# Patient Record
Sex: FEMALE | Race: WHITE | HISPANIC OR LATINO | Employment: OTHER | URBAN - NONMETROPOLITAN AREA
[De-identification: names, ages, dates, MRNs, and addresses within clinical notes are randomized per-mention and may not be internally consistent; named-entity substitution may affect disease eponyms.]

---

## 2017-01-04 ENCOUNTER — OFFICE VISIT (OUTPATIENT)
Dept: CARDIOLOGY | Facility: CLINIC | Age: 77
End: 2017-01-04

## 2017-01-04 VITALS
HEART RATE: 98 BPM | BODY MASS INDEX: 24.97 KG/M2 | WEIGHT: 127.2 LBS | SYSTOLIC BLOOD PRESSURE: 193 MMHG | DIASTOLIC BLOOD PRESSURE: 79 MMHG | HEIGHT: 60 IN

## 2017-01-04 DIAGNOSIS — I50.32 CHRONIC DIASTOLIC HEART FAILURE (HCC): Primary | ICD-10-CM

## 2017-01-04 DIAGNOSIS — I10 ESSENTIAL HYPERTENSION: ICD-10-CM

## 2017-01-04 DIAGNOSIS — R00.2 PALPITATIONS: ICD-10-CM

## 2017-01-04 PROBLEM — Z79.899 POLYPHARMACY: Status: ACTIVE | Noted: 2017-01-04

## 2017-01-04 PROCEDURE — 99213 OFFICE O/P EST LOW 20 MIN: CPT | Performed by: INTERNAL MEDICINE

## 2017-01-04 RX ORDER — GRANULES FOR ORAL 3 G/1
3 POWDER ORAL ONCE
COMMUNITY
End: 2017-03-16

## 2017-01-04 NOTE — MR AVS SNAPSHOT
Lisa Calloway   1/4/2017 1:00 PM   Office Visit    Dept Phone:  [phone]   Encounter #:  [id number]    Provider:  Flako Lam MD   Department:  [facility] CARDIOLOGY                Your Full Care Plan              Today's Medication Changes          These changes are accurate as of: 1/4/17  1:58 PM.  If you have any questions, ask your nurse or doctor.               Stop taking medication(s)listed here:     atenolol 25 MG tablet   Commonly known as:  TENORMIN   Stopped by:  Flako Lam MD           calcium acetate 667 MG capsule   Commonly known as:  PHOSLO   Stopped by:  Flako Lam MD           fluconazole 150 MG tablet   Commonly known as:  DIFLUCAN   Stopped by:  Flako Lam MD           pravastatin 20 MG tablet   Commonly known as:  PRAVACHOL   Stopped by:  Flako Lam MD           triamcinolone 0.1 % paste   Commonly known as:  KENALOG   Stopped by:  Flako Lam MD                      Your Updated Medication List          This list is accurate as of: 1/4/17  1:58 PM.  Always use your most recent med list.                amitriptyline 10 MG tablet   Commonly known as:  ELAVIL       aspirin 81 MG tablet       baclofen 10 MG tablet   Commonly known as:  LIORESAL       busPIRone 10 MG tablet   Commonly known as:  BUSPAR       Calcium-Magnesium-Vitamin D - MG-MG-UNIT tablet sustained-release 24 hour       CELEBREX 200 MG capsule   Generic drug:  celecoxib       fluticasone 50 MCG/ACT nasal spray   Commonly known as:  FLONASE       fosfomycin 3 G pack   Commonly known as:  MONUROL       furosemide 20 MG tablet   Commonly known as:  LASIX       hydrochlorothiazide 12.5 MG capsule   Commonly known as:  MICROZIDE       loratadine 10 MG tablet   Commonly known as:  CLARITIN       MULTI VITAMIN DAILY tablet       multivitamin with fluoride/iron 0.25-10 MG/ML solution  solution       NEURONTIN 600 MG tablet   Generic drug:  gabapentin       NIFEdipine XL 30 MG 24 hr tablet   Commonly known as:  PROCARDIA XL       omeprazole 20 MG capsule   Commonly known as:  priLOSEC       potassium chloride 10 MEQ CR capsule   Commonly known as:  MICRO-K       PROAIR  (90 BASE) MCG/ACT inhaler   Generic drug:  albuterol       traMADol 50 MG tablet   Commonly known as:  ULTRAM               You Were Diagnosed With        Codes Comments    Essential hypertension    -  Primary ICD-10-CM: I10  ICD-9-CM: 401.9     Raynaud's disease without gangrene     ICD-10-CM: I73.00  ICD-9-CM: 443.0     Chronic diastolic heart failure     ICD-10-CM: I50.32  ICD-9-CM: 428.32     Palpitations     ICD-10-CM: R00.2  ICD-9-CM: 785.1       Instructions     None    Patient Instructions History      Upcoming Appointments     Visit Type Date Time Department    FOLLOW UP 1/4/2017  1:00 PM [facility]    FOLLOW UP 7/19/2017  1:45 PM [facility]      [employer] Signup     Our records indicate that you have declined [facility] signup. If you would like to sign up for [employer], please email [email or web address]" or call [phone] to obtain an activation code.             Other Info from Your Visit           Your Appointments     Jul 19, 2017  1:45 PM EDT   Follow Up with Flako Lam MD   [facility] (--)    [address]  [facility] [id number] [phone]           Arrive 15 minutes prior to appointment.              Allergies     Amlodipine Intolerance GI Intolerance    Atorvastatin  Myalgia    Beta Adrenergic Blockers      Codeine Intolerance GI Intolerance    Coreg [Carvedilol]  GI Intolerance    Ergotamine-caffeine      Lisinopril  Other (See Comments)    hypotension    Metoprolol Intolerance GI Intolerance    Other      Tenormin [Atenolol] Intolerance GI Intolerance    Trovan [Trovafloxacin]      Cefprozil Allergy  "Rash    Contrast Dye  Rash    Fish-derived Products Allergy Rash    Penicillins Allergy Rash    Sectral [Acebutolol Hcl]  Rash    Sulfa Antibiotics Allergy Rash    Tetracycline Intolerance Rash      Reason for Visit     Diastolic heart failure follow up    Palpitations           Vital Signs     Blood Pressure Pulse Height Weight Body Mass Index Smoking Status    193/79 (BP Location: Right arm, Patient Position: Sitting) 98 60\" (152.4 cm) 127 lb 3.2 oz (57.7 kg) 24.84 kg/m2 Never Smoker      Problems and Diagnoses Noted     Heart failure    High blood pressure    Palpitations    Raynaud disease        "

## 2017-01-04 NOTE — ASSESSMENT & PLAN NOTE
· Severely elevated today's visit  · Patient reports her systolic blood pressure was 135 and Dr. Acosta's clinic yesterday.  · Given her multiple drug allergies, I will not make any changes or additions to her medical therapy today.

## 2017-01-04 NOTE — ASSESSMENT & PLAN NOTE
· NYHA class III heart failure symptoms  · Reasonable to liberalize loop diuretic treatment if shortness of breath symptoms persist/worsen

## 2017-01-04 NOTE — LETTER
January 4, 2017     Tobin Acosta MD  [address] [place] [address]    Patient: Lisa Calloway   YOB: 1940   Date of Visit: 1/4/2017       Dear Dr. Karla MD:    Thank you for referring Lisa Calloway to me for evaluation. Below are the relevant portions of my assessment and plan of care.    If you have questions, please do not hesitate to call me. I look forward to following Lisa along with you.         Sincerely,        Flako Lam MD        CC: No Recipients  Flako Lam MD  1/4/2017  4:11 PM  Signed  Encounter Date:01/04/2017    Patient ID: Lisa Calloway is a 76 y.o. female who is  and resides in [place], [place].  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    CC/Reason for visit:  Diastolic heart failure (follow up) and Palpitations          Lisa Calloway returns to the office today in follow-up of her diastolic heart failure palpitations.  Lisa continues to have episodes of palpitations and diaphoresis which occur sporadically and spontaneously.  She is being treated for anxiety/panic disorder with BuSpar with some improvement in symptoms.  She loss one of her best friend's on [address].  She is accompanied by another friend today.  Overall, her clinical course is been steady and she denies any new symptoms of heart failure or chest pain.    Review of Systems   Constitution: Positive for malaise/fatigue and night sweats. Negative for weakness.   Eyes: Negative for vision loss in left eye and vision loss in right eye.   Cardiovascular: Negative for chest pain, dyspnea on exertion, near-syncope, orthopnea, palpitations, paroxysmal nocturnal dyspnea and syncope.   Musculoskeletal: Negative for myalgias.   Neurological: Negative for brief paralysis, excessive daytime sleepiness, focal weakness, numbness and paresthesias.   Psychiatric/Behavioral: The patient is nervous/anxious.    All other systems  reviewed and are negative.      The patient's past medical, social, and family history reviewed in the patient's electronic medical record.    Allergies  Amlodipine; Atorvastatin; Beta adrenergic blockers; Codeine; Coreg [carvedilol]; Ergotamine-caffeine; Lisinopril; Metoprolol; Other; Tenormin [atenolol]; Trovan [trovafloxacin]; Cefprozil; Contrast dye; Fish-derived products; Penicillins; Sectral [acebutolol hcl]; Sulfa antibiotics; and Tetracycline    Outpatient Prescriptions Marked as Taking for the 1/4/17 encounter (Office Visit) with Flako Lam MD   Medication Sig Dispense Refill   • amitriptyline (ELAVIL) 10 MG tablet Take 10 mg by mouth At Night As Needed.     • aspirin 81 MG tablet Take 1 tablet by mouth daily.     • baclofen (LIORESAL) 10 MG tablet Take 1 tablet by mouth 2 (two) times a day.     • busPIRone (BUSPAR) 10 MG tablet Take 10 mg by mouth Daily.  4   • Calcium-Magnesium-Vitamin D - MG-MG-UNIT tablet sustained-release 24 hour Take  by mouth.     • celecoxib (CELEBREX) 200 MG capsule Take 1 capsule by mouth daily.     • fluticasone (FLONASE) 50 MCG/ACT nasal spray 1 spray into each nostril Daily.  1   • fosfomycin (MONUROL) 3 G pack Take 3 g by mouth 1 (One) Time.     • furosemide (LASIX) 20 MG tablet Take 20 mg by mouth Daily As Needed.     • gabapentin (NEURONTIN) 600 MG tablet Take 600 mg by mouth Daily.     • hydrochlorothiazide (MICROZIDE) 12.5 MG capsule Take  by mouth daily.     • loratadine (CLARITIN) 10 MG tablet Take 10 mg by mouth Daily.  1   • Multiple Vitamin (MULTI VITAMIN DAILY) tablet Take  by mouth.     • NIFEdipine XL (PROCARDIA XL) 30 MG 24 hr tablet      • omeprazole (PriLOSEC) 20 MG capsule Take 20 mg by mouth Daily.     • Ped Multivitamins-Fl-Iron (MULTIVITAMIN WITH FLUORIDE/IRON) 0.25-10 MG/ML solution solution Take  by mouth Daily.     • potassium chloride (MICRO-K) 10 MEQ CR capsule Take 1 tablet by mouth every 8 (eight) hours.     • PROAIR   "(90 BASE) MCG/ACT inhaler      • traMADol (ULTRAM) 50 MG tablet Take  by mouth Every 8 (Eight) Hours As Needed.           Blood pressure (!) 193/79, pulse 98, height 60\" (152.4 cm), weight 127 lb 3.2 oz (57.7 kg).  Body mass index is 24.84 kg/(m^2).    Physical Exam   Constitutional: She is oriented to person, place, and time. She appears well-developed and well-nourished.   HENT:   Head: Normocephalic and atraumatic.   Eyes: Pupils are equal, round, and reactive to light. No scleral icterus.   Neck: No JVD present. Carotid bruit is not present. No thyromegaly present.   Cardiovascular: Normal rate, regular rhythm, S1 normal and S2 normal.  Exam reveals no gallop.    No murmur heard.  Pulmonary/Chest: Effort normal and breath sounds normal.   Abdominal: Soft. There is no tenderness.   Neurological: She is alert and oriented to person, place, and time.   Skin: Skin is warm and dry. No cyanosis. Nails show no clubbing.   Psychiatric: She has a normal mood and affect. Her behavior is normal.       Data Review:   Procedures         Problem List Items Addressed This Visit        Cardiology Problems    Chronic diastolic heart failure - Primary    Overview     · Echo (8/10/2011): LVH. LVEF 70%.  No significant valvular abnormality.   · Pharmacologic nuclear stress (7/28/2011): No ischemia. LVEF 80%.  · Recurrent chest pain/CHF presentations to the [place] area.  · Cardiac catheterization (5/23/2012):  Normal coronary arteries, LVEF 65%, LVEDP 32 mmHg.  · Echocardiogram for worsening dyspnea (3/18/2016): LVEF >65%. No significant valvular abnormalities. RVSP 43 mmHg.   · GXT (3/18//2016): Exercised for 3 minutes (6 minutes expected). No ischemic changes. Normal oxygen saturation.          Current Assessment & Plan     · NYHA class III heart failure symptoms  · Reasonable to liberalize loop diuretic treatment if shortness of breath symptoms persist/worsen            Other    Essential hypertension    Overview    "  · Renal angiography, (05/23/2012): normal bilateral renal arteries.          Current Assessment & Plan     · Severely elevated today's visit  · Patient reports her systolic blood pressure was 135 and Dr. Acosta's clinic yesterday.  · Given her multiple drug allergies, I will not make any changes or additions to her medical therapy today.         Palpitations    Overview     · Essentially normal Holter monitor, 2012.  · Recurrent severe palpitations, October 2015.         Current Assessment & Plan     · Stable symptoms likely related to anxiety/panic disorder                    · Continue present medical therapy  · Return to clinic in 6 months    Flako Lam MD  1/4/2017

## 2017-01-04 NOTE — PROGRESS NOTES
Encounter Date:01/04/2017    Patient ID: Lisa Calloway is a 76 y.o. female who is  and resides in [place], [place].  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    CC/Reason for visit:  Diastolic heart failure (follow up) and Palpitations          Lisa Calloway returns to the office today in follow-up of her diastolic heart failure palpitations.  Lisa continues to have episodes of palpitations and diaphoresis which occur sporadically and spontaneously.  She is being treated for anxiety/panic disorder with BuSpar with some improvement in symptoms.  She loss one of her best friend's on [address].  She is accompanied by another friend today.  Overall, her clinical course is been steady and she denies any new symptoms of heart failure or chest pain.    Review of Systems   Constitution: Positive for malaise/fatigue and night sweats. Negative for weakness.   Eyes: Negative for vision loss in left eye and vision loss in right eye.   Cardiovascular: Negative for chest pain, dyspnea on exertion, near-syncope, orthopnea, palpitations, paroxysmal nocturnal dyspnea and syncope.   Musculoskeletal: Negative for myalgias.   Neurological: Negative for brief paralysis, excessive daytime sleepiness, focal weakness, numbness and paresthesias.   Psychiatric/Behavioral: The patient is nervous/anxious.    All other systems reviewed and are negative.      The patient's past medical, social, and family history reviewed in the patient's electronic medical record.    Allergies  Amlodipine; Atorvastatin; Beta adrenergic blockers; Codeine; Coreg [carvedilol]; Ergotamine-caffeine; Lisinopril; Metoprolol; Other; Tenormin [atenolol]; Trovan [trovafloxacin]; Cefprozil; Contrast dye; Fish-derived products; Penicillins; Sectral [acebutolol hcl]; Sulfa antibiotics; and Tetracycline    Outpatient Prescriptions Marked as Taking for the 1/4/17 encounter (Office Visit) with Flako MALDONADO  "MD [facility]   Medication Sig Dispense Refill   • amitriptyline (ELAVIL) 10 MG tablet Take 10 mg by mouth At Night As Needed.     • aspirin 81 MG tablet Take 1 tablet by mouth daily.     • baclofen (LIORESAL) 10 MG tablet Take 1 tablet by mouth 2 (two) times a day.     • busPIRone (BUSPAR) 10 MG tablet Take 10 mg by mouth Daily.  4   • Calcium-Magnesium-Vitamin D - MG-MG-UNIT tablet sustained-release 24 hour Take  by mouth.     • celecoxib (CELEBREX) 200 MG capsule Take 1 capsule by mouth daily.     • fluticasone (FLONASE) 50 MCG/ACT nasal spray 1 spray into each nostril Daily.  1   • fosfomycin (MONUROL) 3 G pack Take 3 g by mouth 1 (One) Time.     • furosemide (LASIX) 20 MG tablet Take 20 mg by mouth Daily As Needed.     • gabapentin (NEURONTIN) 600 MG tablet Take 600 mg by mouth Daily.     • hydrochlorothiazide (MICROZIDE) 12.5 MG capsule Take  by mouth daily.     • loratadine (CLARITIN) 10 MG tablet Take 10 mg by mouth Daily.  1   • Multiple Vitamin (MULTI VITAMIN DAILY) tablet Take  by mouth.     • NIFEdipine XL (PROCARDIA XL) 30 MG 24 hr tablet      • omeprazole (PriLOSEC) 20 MG capsule Take 20 mg by mouth Daily.     • Ped Multivitamins-Fl-Iron (MULTIVITAMIN WITH FLUORIDE/IRON) 0.25-10 MG/ML solution solution Take  by mouth Daily.     • potassium chloride (MICRO-K) 10 MEQ CR capsule Take 1 tablet by mouth every 8 (eight) hours.     • PROAIR  (90 BASE) MCG/ACT inhaler      • traMADol (ULTRAM) 50 MG tablet Take  by mouth Every 8 (Eight) Hours As Needed.           Blood pressure (!) 193/79, pulse 98, height 60\" (152.4 cm), weight 127 lb 3.2 oz (57.7 kg).  Body mass index is 24.84 kg/(m^2).    Physical Exam   Constitutional: She is oriented to person, place, and time. She appears well-developed and well-nourished.   HENT:   Head: Normocephalic and atraumatic.   Eyes: Pupils are equal, round, and reactive to light. No scleral icterus.   Neck: No JVD present. Carotid bruit is not present. No " thyromegaly present.   Cardiovascular: Normal rate, regular rhythm, S1 normal and S2 normal.  Exam reveals no gallop.    No murmur heard.  Pulmonary/Chest: Effort normal and breath sounds normal.   Abdominal: Soft. There is no tenderness.   Neurological: She is alert and oriented to person, place, and time.   Skin: Skin is warm and dry. No cyanosis. Nails show no clubbing.   Psychiatric: She has a normal mood and affect. Her behavior is normal.       Data Review:   Procedures         Problem List Items Addressed This Visit        Cardiology Problems    Chronic diastolic heart failure - Primary    Overview     · Echo (8/10/2011): LVH. LVEF 70%.  No significant valvular abnormality.   · Pharmacologic nuclear stress (7/28/2011): No ischemia. LVEF 80%.  · Recurrent chest pain/CHF presentations to the [facility].  · Cardiac catheterization (5/23/2012):  Normal coronary arteries, LVEF 65%, LVEDP 32 mmHg.  · Echocardiogram for worsening dyspnea (3/18/2016): LVEF >65%. No significant valvular abnormalities. RVSP 43 mmHg.   · GXT (3/18//2016): Exercised for 3 minutes (6 minutes expected). No ischemic changes. Normal oxygen saturation.          Current Assessment & Plan     · NYHA class III heart failure symptoms  · Reasonable to liberalize loop diuretic treatment if shortness of breath symptoms persist/worsen            Other    Essential hypertension    Overview     · Renal angiography, (05/23/2012): normal bilateral renal arteries.          Current Assessment & Plan     · Severely elevated today's visit  · Patient reports her systolic blood pressure was 135 and Dr. Acosta's clinic yesterday.  · Given her multiple drug allergies, I will not make any changes or additions to her medical therapy today.         Palpitations    Overview     · Essentially normal Holter monitor, 2012.  · Recurrent severe palpitations, October 2015.         Current Assessment & Plan     · Stable symptoms likely related to anxiety/panic  disorder                    · Continue present medical therapy  · Return to clinic in 6 months    Flako Lam MD  1/4/2017

## 2017-02-20 ENCOUNTER — OFFICE VISIT (OUTPATIENT)
Dept: NEUROSURGERY | Facility: CLINIC | Age: 77
End: 2017-02-20

## 2017-02-20 DIAGNOSIS — M43.16 SPONDYLOLISTHESIS OF LUMBAR REGION: Primary | ICD-10-CM

## 2017-02-20 PROBLEM — M48.062 SPINAL STENOSIS, LUMBAR REGION, WITH NEUROGENIC CLAUDICATION: Status: ACTIVE | Noted: 2017-02-20

## 2017-02-20 PROCEDURE — 99214 OFFICE O/P EST MOD 30 MIN: CPT | Performed by: NEUROLOGICAL SURGERY

## 2017-02-20 NOTE — PROGRESS NOTES
Subjective   Patient ID: Lisa Calloway is a 77 y.o. female is being seen for consultation today at the request of Tobin Acosta MD  Chief Complaint: Back and leg pain    History of Present Illness: The patient is a 77-year-old woman who has a history of progressive back and leg pain over the past year that has become extremely severe in the past 7-8 weeks, to the point that now she is taking hydrocodone 7.5 at least 3 times a day.  She had an L4 5 PLIF in January 2013, 4 years ago for spondylolisthesis with stenosis.  I last saw her in August 2015 with symptoms of increasing back pain and findings on MRI scan of early facet hypertrophy at L3 4 and a 2 mm spondylolisthesis at L3 4.  She has tried physical therapy in the past and actually felt worse with the attempts then seeing any improvement.  She has had a left hip replacement in 2014 and had a staph infection afterwards.  She is now nearly incapacitated by the level of pain she's having although she's functional when she is at a dose of hydrocodone.    Review of Radiographic Studies:  Lumbar MRI scan on 1/27/17 shows severe spinal stenosis now at L3 4 with a grade 1 spondylolisthesis.  The remaining spinal levels are unremarkable.  There is a PLIF at L4 5 that looks fine and a minor disc bulge at L5-S1.    The following portions of the patient's history were reviewed, updated as appropriate and approved: allergies, current medications, past family history, past medical history, past social history, past surgical history, review of systems and problem list.  Review of Systems   Constitutional: Positive for activity change, appetite change and diaphoresis. Negative for chills, fatigue, fever and unexpected weight change.   HENT: Positive for sinus pressure. Negative for congestion, dental problem, drooling, ear discharge, ear pain, facial swelling, hearing loss, mouth sores, nosebleeds, postnasal drip, rhinorrhea, sneezing, sore throat, tinnitus, trouble swallowing  and voice change.    Eyes: Negative for photophobia, pain, discharge, redness, itching and visual disturbance.   Respiratory: Positive for shortness of breath. Negative for apnea, cough, choking, chest tightness, wheezing and stridor.    Cardiovascular: Positive for leg swelling. Negative for chest pain and palpitations.   Gastrointestinal: Negative for abdominal distention, abdominal pain, anal bleeding, blood in stool, constipation, diarrhea, nausea, rectal pain and vomiting.   Endocrine: Negative for cold intolerance, heat intolerance, polydipsia, polyphagia and polyuria.   Genitourinary: Negative for decreased urine volume, difficulty urinating, dysuria, enuresis, flank pain, frequency, genital sores, hematuria and urgency.   Musculoskeletal: Positive for back pain. Negative for arthralgias, gait problem, joint swelling, myalgias, neck pain and neck stiffness.   Skin: Negative for color change, pallor, rash and wound.   Allergic/Immunologic: Negative for environmental allergies, food allergies and immunocompromised state.   Neurological: Positive for weakness and light-headedness. Negative for dizziness, tremors, seizures, syncope, facial asymmetry, speech difficulty, numbness and headaches.   Hematological: Negative for adenopathy. Does not bruise/bleed easily.   Psychiatric/Behavioral: Positive for sleep disturbance. Negative for agitation, behavioral problems, confusion, decreased concentration, dysphoric mood, hallucinations, self-injury and suicidal ideas. The patient is hyperactive. The patient is not nervous/anxious.    All other systems reviewed and are negative.      Objective     NEUROLOGICAL EXAMINATION:      MENTAL STATUS:  Alert and oriented.  Speech intact.  Recent and remote memory intact.      CRANIAL NERVES:  Cranial nerve II:  Visual fields are full to confrontation.  Cranial nerves III, IV and VI:  PERRLADC.  Extraocular movements are intact.  Nystagmus is not present.  Cranial nerve V:   Facial sensation is intact to light touch.  Cranial nerve VII:  Muscles of facial expression reveal no asymmetry.  Cranial nerve VIII:  Hearing is intact to finger rub bilaterally.  Cranial nerves IX and X:  Palate elevates symmetrically.  Cranial nerve XI:  Shoulder shrug is intact.  Cranial nerve XII:  Tongue is midline without evidence of atrophy or fasciculation.    MUSCULOSKELETAL:  SLR negative. Vishal's test negative.    MOTOR:  Deltoid, biceps, triceps, and  strength intact.  No hand atrophy.  Hip flexion, knee extension, ankle dorsiflexion and plantar flexion intact. No tremor, spasticity, ataxia, or dysmetria.    SENSATION: Intact to touch upper and lower extremities.  Position sense intact.    REFLEXES:  DTR trace and equal in both knees and ankles.    Assessment   Severely symptomatic stenosis with spondylolisthesis L3 4, prior PLIF L4 5 for years previously.       Plan   PLIF with pedicle screw fixation and decompression L3-4.        Sen Skinner MD

## 2017-02-28 ENCOUNTER — TELEPHONE (OUTPATIENT)
Dept: NEUROSURGERY | Facility: CLINIC | Age: 77
End: 2017-02-28

## 2017-03-16 ENCOUNTER — APPOINTMENT (OUTPATIENT)
Dept: PREADMISSION TESTING | Facility: HOSPITAL | Age: 77
End: 2017-03-16

## 2017-03-16 ENCOUNTER — TELEPHONE (OUTPATIENT)
Dept: CARDIOLOGY | Facility: CLINIC | Age: 77
End: 2017-03-16

## 2017-03-16 VITALS — BODY MASS INDEX: 25.36 KG/M2 | WEIGHT: 129.19 LBS | HEIGHT: 60 IN

## 2017-03-16 DIAGNOSIS — R79.9 ABNORMAL FINDING OF BLOOD CHEMISTRY: ICD-10-CM

## 2017-03-16 DIAGNOSIS — Z22.322 CARRIER OR SUSPECTED CARRIER OF METHICILLIN RESISTANT STAPHYLOCOCCUS AUREUS: ICD-10-CM

## 2017-03-16 DIAGNOSIS — M43.10 ACQUIRED SPONDYLOLISTHESIS: Primary | ICD-10-CM

## 2017-03-16 DIAGNOSIS — Z98.890 H/O SURGICAL PROCEDURE: ICD-10-CM

## 2017-03-16 DIAGNOSIS — M43.10 ACQUIRED SPONDYLOLISTHESIS: ICD-10-CM

## 2017-03-16 LAB
ALBUMIN SERPL-MCNC: 4.6 G/DL (ref 3.2–4.8)
ALBUMIN/GLOB SERPL: 1.6 G/DL (ref 1.5–2.5)
ALP SERPL-CCNC: 114 U/L (ref 25–100)
ALT SERPL W P-5'-P-CCNC: 31 U/L (ref 7–40)
ANION GAP SERPL CALCULATED.3IONS-SCNC: 11 MMOL/L (ref 3–11)
AST SERPL-CCNC: 33 U/L (ref 0–33)
BILIRUB SERPL-MCNC: 0.2 MG/DL (ref 0.3–1.2)
BUN BLD-MCNC: 16 MG/DL (ref 9–23)
BUN/CREAT SERPL: 22.9 (ref 7–25)
CALCIUM SPEC-SCNC: 10.1 MG/DL (ref 8.7–10.4)
CHLORIDE SERPL-SCNC: 104 MMOL/L (ref 99–109)
CO2 SERPL-SCNC: 28 MMOL/L (ref 20–31)
CREAT BLD-MCNC: 0.7 MG/DL (ref 0.6–1.3)
DEPRECATED RDW RBC AUTO: 45 FL (ref 37–54)
ERYTHROCYTE [DISTWIDTH] IN BLOOD BY AUTOMATED COUNT: 13.6 % (ref 11.3–14.5)
GFR SERPL CREATININE-BSD FRML MDRD: 81 ML/MIN/1.73
GLOBULIN UR ELPH-MCNC: 2.9 GM/DL
GLUCOSE BLD-MCNC: 158 MG/DL (ref 70–100)
HBA1C MFR BLD: 5.8 % (ref 4.8–5.6)
HCT VFR BLD AUTO: 40.8 % (ref 34.5–44)
HGB BLD-MCNC: 13.5 G/DL (ref 11.5–15.5)
MCH RBC QN AUTO: 29.9 PG (ref 27–31)
MCHC RBC AUTO-ENTMCNC: 33.1 G/DL (ref 32–36)
MCV RBC AUTO: 90.3 FL (ref 80–99)
MRSA DNA SPEC QL NAA+PROBE: POSITIVE
PLATELET # BLD AUTO: 330 10*3/MM3 (ref 150–450)
PMV BLD AUTO: 9.5 FL (ref 6–12)
POTASSIUM BLD-SCNC: 3.3 MMOL/L (ref 3.5–5.5)
PROT SERPL-MCNC: 7.5 G/DL (ref 5.7–8.2)
RBC # BLD AUTO: 4.52 10*6/MM3 (ref 3.89–5.14)
SODIUM BLD-SCNC: 143 MMOL/L (ref 132–146)
WBC NRBC COR # BLD: 6.95 10*3/MM3 (ref 3.5–10.8)

## 2017-03-16 PROCEDURE — 93010 ELECTROCARDIOGRAM REPORT: CPT | Performed by: INTERNAL MEDICINE

## 2017-03-16 RX ORDER — CHLORHEXIDINE GLUCONATE 4 G/100ML
SOLUTION TOPICAL
Qty: 120 ML
Start: 2017-03-16 | End: 2017-03-16

## 2017-03-16 RX ORDER — HYDROCODONE BITARTRATE AND ACETAMINOPHEN 7.5; 325 MG/1; MG/1
1 TABLET ORAL EVERY 8 HOURS PRN
COMMUNITY
End: 2017-03-21 | Stop reason: HOSPADM

## 2017-03-16 RX ORDER — SODIUM CHLORIDE 0.9 % (FLUSH) 0.9 %
1-10 SYRINGE (ML) INJECTION AS NEEDED
Status: CANCELLED | OUTPATIENT
Start: 2017-03-16

## 2017-03-16 RX ORDER — SODIUM CHLORIDE, SODIUM LACTATE, POTASSIUM CHLORIDE, CALCIUM CHLORIDE 600; 310; 30; 20 MG/100ML; MG/100ML; MG/100ML; MG/100ML
100 INJECTION, SOLUTION INTRAVENOUS CONTINUOUS
Status: CANCELLED | OUTPATIENT
Start: 2017-03-16

## 2017-03-16 RX ORDER — SENNA AND DOCUSATE SODIUM 50; 8.6 MG/1; MG/1
1 TABLET, FILM COATED ORAL AS NEEDED
COMMUNITY
End: 2018-02-14

## 2017-03-16 NOTE — PAT
Called Dr. Lam's office and left message for clearance. Office called back and stated she would provide clearance in Epic.   MEASURED FOR TEDS/SCDS IN PAT    CALF MEASUREMENT   13.5   LENGTH MEASUREMENT 16    Patient to apply to surgical area (as instructed) the night before procedure and the AM of procedure.

## 2017-03-16 NOTE — TELEPHONE ENCOUNTER
Nevaeh with PAT called to obtain cardiac clearance for a scheduled lumbar fusion tomorrow with Dr. Skinner. Is it OK to clear?

## 2017-03-16 NOTE — PAT
Left voice message for Judi Carrion in case management stating that patient says she lives alone and will have to go to [facility] after discharge. Ms. Mariags stated that she has spoken with her insurance company also. Dr. Ramos also was also notified-spoke with Carlyn

## 2017-03-16 NOTE — H&P
HISTORY AND PHYSICAL for SURGERY    Referring Provider: Tobin Acosta MD    Chief complaint: Back and leg pain    Subjective .   History of present illness:    The patient is a 77-year-old woman who has a history of progressive back and leg pain over the past year that has become extremely severe in the past 7-8 weeks, to the point that now she is taking hydrocodone 7.5 at least 3 times a day. She had an L4 5 PLIF in January 2013, 4 years ago for spondylolisthesis with stenosis. I last saw her in August 2015 with symptoms of increasing back pain and findings on MRI scan of early facet hypertrophy at L3 4 and a 2 mm spondylolisthesis at L3 4. She has tried physical therapy in the past and actually felt worse with the attempts then seeing any improvement. She has had a left hip replacement in 2014 and had a staph infection afterwards. She is now nearly incapacitated by the level of pain she's having although she's functional when she is at a dose of hydrocodone.       History  Past Medical History   Diagnosis Date   • Calcinosis-Raynaud's sclerodactyly-telangiectasia syndrome    • Diastolic heart failure 12/9/2016     Echocardiogram, 08/10/2011: LVH, LVEF 70% and no significant valvular abnormality.  Pharmacologic MPS, 07/28/2011: normal, LVEF 80%. Recurrent chest pain/CHF presentations to the [place], [place] area. Cardiac catheterization, 05/23/2012: Normal coronary arteries, LVEF 65%, LVEDP 32 mmHg. Renal angiography, 05/23/2012: normal bilateral renal arteries.  Echocardiogram for worsening dyspnea, 03/ • History of stomach ulcers    • Lumbar herniated disc      with right-sided sciatica:Status post lumbar fusion, 01/17/2013.      Past Surgical History   Procedure Laterality Date   • Other surgical history       arthrodesis lumbar   • Cardiac surgery     • Hip surgery     • Other surgical history  1982     neuroplasty decompression median nerve at carpal tunnel   • Tonsillectomy  1963   • Tubal abdominal  ligation  1975   • Appendectomy  1975   • Cholecystectomy  1996   • Hysterectomy  1999    family history includes Cancer in her other; Congenital heart disease in her mother; Gout in her other; Hypertension in her mother; Stroke in her mother.   Social History     Social History   • Marital status:      Spouse name: N/A   • Number of children: N/A   • Years of education: N/A     Social History Main Topics   • Smoking status: Never Smoker   • Smokeless tobacco: Not on file   • Alcohol use No   • Drug use: No   • Sexual activity: Defer     Other Topics Concern   • Not on file     Social History Narrative       Allergies:  Amlodipine; Atorvastatin; Beta adrenergic blockers; Codeine; Coreg [carvedilol]; Ergotamine-caffeine; Lisinopril; Metoprolol; Other; Tenormin [atenolol]; Trovan [trovafloxacin]; Cefprozil; Contrast dye; Fish-derived products; Penicillins; Sectral [acebutolol hcl]; Sulfa antibiotics; and Tetracycline    Objective   Review of Systems  Constitutional: Positive for activity change, appetite change and diaphoresis. Negative for chills, fatigue, fever and unexpected weight change.   HENT: Positive for sinus pressure. Negative for congestion, dental problem, drooling, ear discharge, ear pain, facial swelling, hearing loss, mouth sores, nosebleeds, postnasal drip, rhinorrhea, sneezing, sore throat, tinnitus, trouble swallowing and voice change.   Eyes: Negative for photophobia, pain, discharge, redness, itching and visual disturbance.   Respiratory: Positive for shortness of breath. Negative for apnea, cough, choking, chest tightness, wheezing and stridor.   Cardiovascular: Positive for leg swelling. Negative for chest pain and palpitations.   Gastrointestinal: Negative for abdominal distention, abdominal pain, anal bleeding, blood in stool, constipation, diarrhea, nausea, rectal pain and vomiting.   Endocrine: Negative for cold intolerance, heat intolerance, polydipsia, polyphagia and polyuria.    Genitourinary: Negative for decreased urine volume, difficulty urinating, dysuria, enuresis, flank pain, frequency, genital sores, hematuria and urgency.   Musculoskeletal: Positive for back pain. Negative for arthralgias, gait problem, joint swelling, myalgias, neck pain and neck stiffness.   Skin: Negative for color change, pallor, rash and wound.   Allergic/Immunologic: Negative for environmental allergies, food allergies and immunocompromised state.   Neurological: Positive for weakness and light-headedness. Negative for dizziness, tremors, seizures, syncope, facial asymmetry, speech difficulty, numbness and headaches.   Hematological: Negative for adenopathy. Does not bruise/bleed easily.   Psychiatric/Behavioral: Positive for sleep disturbance. Negative for agitation, behavioral problems, confusion, decreased concentration, dysphoric mood, hallucinations, self-injury and suicidal ideas. The patient is hyperactive. The patient is not nervous/anxious.     Physical Exam:  NEUROLOGICAL EXAMINATION:      MENTAL STATUS: Alert and oriented. Speech intact. Recent and remote memory intact.       CRANIAL NERVES:  Cranial nerve II: Visual fields are full to confrontation.  Cranial nerves III, IV and VI: PERRLADC. Extraocular movements are intact. Nystagmus is not present.  Cranial nerve V: Facial sensation is intact to light touch.  Cranial nerve VII: Muscles of facial expression reveal no asymmetry.  Cranial nerve VIII: Hearing is intact to finger rub bilaterally.  Cranial nerves IX and X: Palate elevates symmetrically.  Cranial nerve XI: Shoulder shrug is intact.  Cranial nerve XII: Tongue is midline without evidence of atrophy or fasciculation.     MUSCULOSKELETAL: SLR negative. Vishal's test negative.     MOTOR: Deltoid, biceps, triceps, and  strength intact. No hand atrophy. Hip flexion, knee extension, ankle dorsiflexion and plantar flexion intact. No tremor, spasticity, ataxia, or dysmetria.     SENSATION:  Intact to touch upper and lower extremities. Position sense intact.     REFLEXES: DTR trace and equal in both knees and ankles.      Results Review:  Lumbar MRI scan on 1/27/17 shows severe spinal stenosis now at L3 4 with a grade 1 spondylolisthesis. The remaining spinal levels are unremarkable. There is a PLIF at L4 5 that looks fine and a minor disc bulge at L5-S1.    Assessment/Plan   Assessment     Severely symptomatic stenosis with spondylolisthesis L3 4, prior PLIF L4 5 for years previously.        Plan     PLIF with pedicle screw fixation and decompression L3-4.      Eugenia Mccann PA-C  On behalf of Sen Skinner MD  03/16/17  9:52 AM

## 2017-03-17 ENCOUNTER — ANESTHESIA (OUTPATIENT)
Dept: PERIOP | Facility: HOSPITAL | Age: 77
End: 2017-03-17

## 2017-03-17 ENCOUNTER — HOSPITAL ENCOUNTER (INPATIENT)
Facility: HOSPITAL | Age: 77
LOS: 4 days | Discharge: NURSING, REHAB OR LONG-TERM CARE | End: 2017-03-21
Attending: NEUROLOGICAL SURGERY | Admitting: NEUROLOGICAL SURGERY

## 2017-03-17 ENCOUNTER — APPOINTMENT (OUTPATIENT)
Dept: GENERAL RADIOLOGY | Facility: HOSPITAL | Age: 77
End: 2017-03-17

## 2017-03-17 ENCOUNTER — ANESTHESIA EVENT (OUTPATIENT)
Dept: PERIOP | Facility: HOSPITAL | Age: 77
End: 2017-03-17

## 2017-03-17 DIAGNOSIS — Z74.09 IMPAIRED MOBILITY AND ADLS: ICD-10-CM

## 2017-03-17 DIAGNOSIS — M43.10 ACQUIRED SPONDYLOLISTHESIS: ICD-10-CM

## 2017-03-17 DIAGNOSIS — Z74.09 IMPAIRED FUNCTIONAL MOBILITY, BALANCE, GAIT, AND ENDURANCE: Primary | ICD-10-CM

## 2017-03-17 DIAGNOSIS — Z78.9 IMPAIRED MOBILITY AND ADLS: ICD-10-CM

## 2017-03-17 PROCEDURE — 22633 ARTHRD CMBN 1NTRSPC LUMBAR: CPT | Performed by: NEUROLOGICAL SURGERY

## 2017-03-17 PROCEDURE — 22840 INSERT SPINE FIXATION DEVICE: CPT | Performed by: NEUROLOGICAL SURGERY

## 2017-03-17 PROCEDURE — 25010000002 FENTANYL CITRATE (PF) 100 MCG/2ML SOLUTION: Performed by: NURSE ANESTHETIST, CERTIFIED REGISTERED

## 2017-03-17 PROCEDURE — 25010000002 PROPOFOL 10 MG/ML EMULSION: Performed by: NURSE ANESTHETIST, CERTIFIED REGISTERED

## 2017-03-17 PROCEDURE — 22853 INSJ BIOMECHANICAL DEVICE: CPT | Performed by: NEUROLOGICAL SURGERY

## 2017-03-17 PROCEDURE — 25010000002 ONDANSETRON PER 1 MG: Performed by: NURSE ANESTHETIST, CERTIFIED REGISTERED

## 2017-03-17 PROCEDURE — 25010000003 MORPHINE 5 MG/ML SOLUTION: Performed by: NURSE ANESTHETIST, CERTIFIED REGISTERED

## 2017-03-17 PROCEDURE — 25010000002 ALBUMIN HUMAN 5% PER 50 ML: Performed by: NURSE ANESTHETIST, CERTIFIED REGISTERED

## 2017-03-17 PROCEDURE — C1713 ANCHOR/SCREW BN/BN,TIS/BN: HCPCS | Performed by: NEUROLOGICAL SURGERY

## 2017-03-17 PROCEDURE — P9041 ALBUMIN (HUMAN),5%, 50ML: HCPCS | Performed by: NURSE ANESTHETIST, CERTIFIED REGISTERED

## 2017-03-17 PROCEDURE — 25010000002 NEOSTIGMINE 10 MG/10ML SOLUTION: Performed by: NURSE ANESTHETIST, CERTIFIED REGISTERED

## 2017-03-17 PROCEDURE — 0SG00AJ FUSION OF LUMBAR VERTEBRAL JOINT WITH INTERBODY FUSION DEVICE, POSTERIOR APPROACH, ANTERIOR COLUMN, OPEN APPROACH: ICD-10-PCS | Performed by: NEUROLOGICAL SURGERY

## 2017-03-17 PROCEDURE — 0ST20ZZ RESECTION OF LUMBAR VERTEBRAL DISC, OPEN APPROACH: ICD-10-PCS | Performed by: NEUROLOGICAL SURGERY

## 2017-03-17 PROCEDURE — 76001 HC FLUORO OVER 1 HOUR: CPT

## 2017-03-17 PROCEDURE — 25010000002 HYDROMORPHONE PER 4 MG: Performed by: NURSE ANESTHETIST, CERTIFIED REGISTERED

## 2017-03-17 PROCEDURE — 25010000002 DEXAMETHASONE PER 1 MG: Performed by: NURSE ANESTHETIST, CERTIFIED REGISTERED

## 2017-03-17 PROCEDURE — 20936 SP BONE AGRFT LOCAL ADD-ON: CPT | Performed by: NEUROLOGICAL SURGERY

## 2017-03-17 PROCEDURE — 25010000003 MORPHINE PER 10 MG: Performed by: NEUROLOGICAL SURGERY

## 2017-03-17 PROCEDURE — 0QP004Z REMOVAL OF INTERNAL FIXATION DEVICE FROM LUMBAR VERTEBRA, OPEN APPROACH: ICD-10-PCS | Performed by: NEUROLOGICAL SURGERY

## 2017-03-17 PROCEDURE — 94799 UNLISTED PULMONARY SVC/PX: CPT

## 2017-03-17 PROCEDURE — 25010000002 VANCOMYCIN PER 500 MG

## 2017-03-17 PROCEDURE — 76000 FLUOROSCOPY <1 HR PHYS/QHP: CPT

## 2017-03-17 DEVICE — DBM T42200 2.5CMX10CM 2 EACH GRAFTON MAT
Type: IMPLANTABLE DEVICE | Site: SPINE LUMBAR | Status: FUNCTIONAL
Brand: GRAFTON®AND GRAFTON PLUS®DEMINERALIZED BONE MATRIX (DBM)

## 2017-03-17 DEVICE — SCREW 54840016540 CN MAS 6.5X40 CC
Type: IMPLANTABLE DEVICE | Site: SPINE LUMBAR | Status: FUNCTIONAL
Brand: CD HORIZON® SPINAL SYSTEM

## 2017-03-17 DEVICE — BIOLOGIC 7600105 85 BTCP 15 HA 5CC VIAL
Type: IMPLANTABLE DEVICE | Site: SPINE LUMBAR | Status: FUNCTIONAL
Brand: MASTERGRAFT® GRANULES

## 2017-03-17 DEVICE — SPACER 3992212 22MM X 12MM
Type: IMPLANTABLE DEVICE | Site: SPINE LUMBAR | Status: FUNCTIONAL
Brand: CAPSTONE PTC™ SPINAL SYSTEM

## 2017-03-17 RX ORDER — PANTOPRAZOLE SODIUM 40 MG/1
40 TABLET, DELAYED RELEASE ORAL
Status: DISCONTINUED | OUTPATIENT
Start: 2017-03-18 | End: 2017-03-21 | Stop reason: HOSPADM

## 2017-03-17 RX ORDER — SODIUM CHLORIDE, SODIUM LACTATE, POTASSIUM CHLORIDE, CALCIUM CHLORIDE 600; 310; 30; 20 MG/100ML; MG/100ML; MG/100ML; MG/100ML
9 INJECTION, SOLUTION INTRAVENOUS CONTINUOUS
Status: CANCELLED | OUTPATIENT
Start: 2017-03-17

## 2017-03-17 RX ORDER — TEMAZEPAM 15 MG/1
15 CAPSULE ORAL NIGHTLY PRN
Status: DISCONTINUED | OUTPATIENT
Start: 2017-03-17 | End: 2017-03-21 | Stop reason: HOSPADM

## 2017-03-17 RX ORDER — VANCOMYCIN HYDROCHLORIDE 1 G/200ML
15 INJECTION, SOLUTION INTRAVENOUS EVERY 12 HOURS
Status: DISCONTINUED | OUTPATIENT
Start: 2017-03-17 | End: 2017-03-17 | Stop reason: ALTCHOICE

## 2017-03-17 RX ORDER — ONDANSETRON 2 MG/ML
4 INJECTION INTRAMUSCULAR; INTRAVENOUS ONCE AS NEEDED
Status: DISCONTINUED | OUTPATIENT
Start: 2017-03-17 | End: 2017-03-17 | Stop reason: HOSPADM

## 2017-03-17 RX ORDER — GLYCOPYRROLATE 0.2 MG/ML
INJECTION INTRAMUSCULAR; INTRAVENOUS AS NEEDED
Status: DISCONTINUED | OUTPATIENT
Start: 2017-03-17 | End: 2017-03-17 | Stop reason: SURG

## 2017-03-17 RX ORDER — MORPHINE SULFATE 0.5 MG/ML
INJECTION, SOLUTION EPIDURAL; INTRATHECAL; INTRAVENOUS AS NEEDED
Status: DISCONTINUED | OUTPATIENT
Start: 2017-03-17 | End: 2017-03-17 | Stop reason: HOSPADM

## 2017-03-17 RX ORDER — AMITRIPTYLINE HYDROCHLORIDE 10 MG/1
20 TABLET, FILM COATED ORAL NIGHTLY PRN
Status: DISCONTINUED | OUTPATIENT
Start: 2017-03-17 | End: 2017-03-21 | Stop reason: HOSPADM

## 2017-03-17 RX ORDER — MORPHINE SULFATE 5 MG/ML
INJECTION, SOLUTION INTRAMUSCULAR; INTRAVENOUS AS NEEDED
Status: DISCONTINUED | OUTPATIENT
Start: 2017-03-17 | End: 2017-03-17 | Stop reason: SURG

## 2017-03-17 RX ORDER — CETIRIZINE HYDROCHLORIDE 10 MG/1
10 TABLET ORAL DAILY
Status: DISCONTINUED | OUTPATIENT
Start: 2017-03-17 | End: 2017-03-21 | Stop reason: HOSPADM

## 2017-03-17 RX ORDER — FAMOTIDINE 20 MG/1
20 TABLET, FILM COATED ORAL ONCE
Status: COMPLETED | OUTPATIENT
Start: 2017-03-17 | End: 2017-03-17

## 2017-03-17 RX ORDER — MAGNESIUM HYDROXIDE 1200 MG/15ML
LIQUID ORAL AS NEEDED
Status: DISCONTINUED | OUTPATIENT
Start: 2017-03-17 | End: 2017-03-17 | Stop reason: HOSPADM

## 2017-03-17 RX ORDER — NIFEDIPINE 30 MG/1
30 TABLET, EXTENDED RELEASE ORAL EVERY 8 HOURS SCHEDULED
Status: DISCONTINUED | OUTPATIENT
Start: 2017-03-17 | End: 2017-03-21 | Stop reason: HOSPADM

## 2017-03-17 RX ORDER — POTASSIUM CHLORIDE 750 MG/1
10 CAPSULE, EXTENDED RELEASE ORAL 3 TIMES DAILY
Status: DISCONTINUED | OUTPATIENT
Start: 2017-03-17 | End: 2017-03-21 | Stop reason: HOSPADM

## 2017-03-17 RX ORDER — FLUTICASONE PROPIONATE 50 MCG
1 SPRAY, SUSPENSION (ML) NASAL DAILY
Status: DISCONTINUED | OUTPATIENT
Start: 2017-03-18 | End: 2017-03-21 | Stop reason: HOSPADM

## 2017-03-17 RX ORDER — PROMETHAZINE HYDROCHLORIDE 25 MG/1
25 SUPPOSITORY RECTAL ONCE AS NEEDED
Status: DISCONTINUED | OUTPATIENT
Start: 2017-03-17 | End: 2017-03-17 | Stop reason: HOSPADM

## 2017-03-17 RX ORDER — CELECOXIB 200 MG/1
200 CAPSULE ORAL 2 TIMES DAILY
Status: DISCONTINUED | OUTPATIENT
Start: 2017-03-18 | End: 2017-03-21 | Stop reason: HOSPADM

## 2017-03-17 RX ORDER — DEXAMETHASONE SODIUM PHOSPHATE 4 MG/ML
INJECTION, SOLUTION INTRA-ARTICULAR; INTRALESIONAL; INTRAMUSCULAR; INTRAVENOUS; SOFT TISSUE AS NEEDED
Status: DISCONTINUED | OUTPATIENT
Start: 2017-03-17 | End: 2017-03-17 | Stop reason: SURG

## 2017-03-17 RX ORDER — HYDROCHLOROTHIAZIDE 12.5 MG/1
12.5 CAPSULE, GELATIN COATED ORAL DAILY
Status: DISCONTINUED | OUTPATIENT
Start: 2017-03-18 | End: 2017-03-18 | Stop reason: SDUPTHER

## 2017-03-17 RX ORDER — FENTANYL CITRATE 50 UG/ML
INJECTION, SOLUTION INTRAMUSCULAR; INTRAVENOUS AS NEEDED
Status: DISCONTINUED | OUTPATIENT
Start: 2017-03-17 | End: 2017-03-17 | Stop reason: SURG

## 2017-03-17 RX ORDER — FENTANYL CITRATE 50 UG/ML
50 INJECTION, SOLUTION INTRAMUSCULAR; INTRAVENOUS
Status: DISCONTINUED | OUTPATIENT
Start: 2017-03-17 | End: 2017-03-17 | Stop reason: HOSPADM

## 2017-03-17 RX ORDER — DIPHENOXYLATE HYDROCHLORIDE AND ATROPINE SULFATE 2.5; .025 MG/1; MG/1
1 TABLET ORAL DAILY
Status: DISCONTINUED | OUTPATIENT
Start: 2017-03-18 | End: 2017-03-21 | Stop reason: HOSPADM

## 2017-03-17 RX ORDER — MIDAZOLAM HYDROCHLORIDE 1 MG/ML
1 INJECTION INTRAMUSCULAR; INTRAVENOUS
Status: DISCONTINUED | OUTPATIENT
Start: 2017-03-17 | End: 2017-03-17 | Stop reason: HOSPADM

## 2017-03-17 RX ORDER — SENNA AND DOCUSATE SODIUM 50; 8.6 MG/1; MG/1
1 TABLET, FILM COATED ORAL AS NEEDED
Status: DISCONTINUED | OUTPATIENT
Start: 2017-03-17 | End: 2017-03-21 | Stop reason: HOSPADM

## 2017-03-17 RX ORDER — SODIUM CHLORIDE 0.9 % (FLUSH) 0.9 %
1-10 SYRINGE (ML) INJECTION AS NEEDED
Status: DISCONTINUED | OUTPATIENT
Start: 2017-03-17 | End: 2017-03-17 | Stop reason: HOSPADM

## 2017-03-17 RX ORDER — NALOXONE HCL 0.4 MG/ML
0.1 VIAL (ML) INJECTION
Status: DISCONTINUED | OUTPATIENT
Start: 2017-03-17 | End: 2017-03-21 | Stop reason: HOSPADM

## 2017-03-17 RX ORDER — HYDROCODONE BITARTRATE AND ACETAMINOPHEN 7.5; 325 MG/1; MG/1
1 TABLET ORAL EVERY 8 HOURS PRN
Status: DISCONTINUED | OUTPATIENT
Start: 2017-03-17 | End: 2017-03-19

## 2017-03-17 RX ORDER — HYDROMORPHONE HYDROCHLORIDE 1 MG/ML
0.5 INJECTION, SOLUTION INTRAMUSCULAR; INTRAVENOUS; SUBCUTANEOUS
Status: DISCONTINUED | OUTPATIENT
Start: 2017-03-17 | End: 2017-03-17 | Stop reason: HOSPADM

## 2017-03-17 RX ORDER — ONDANSETRON 2 MG/ML
4 INJECTION INTRAMUSCULAR; INTRAVENOUS EVERY 6 HOURS PRN
Status: DISCONTINUED | OUTPATIENT
Start: 2017-03-17 | End: 2017-03-21 | Stop reason: HOSPADM

## 2017-03-17 RX ORDER — PROMETHAZINE HYDROCHLORIDE 25 MG/ML
6.25 INJECTION, SOLUTION INTRAMUSCULAR; INTRAVENOUS ONCE AS NEEDED
Status: DISCONTINUED | OUTPATIENT
Start: 2017-03-17 | End: 2017-03-17 | Stop reason: HOSPADM

## 2017-03-17 RX ORDER — SODIUM CHLORIDE, SODIUM LACTATE, POTASSIUM CHLORIDE, CALCIUM CHLORIDE 600; 310; 30; 20 MG/100ML; MG/100ML; MG/100ML; MG/100ML
100 INJECTION, SOLUTION INTRAVENOUS CONTINUOUS
Status: DISCONTINUED | OUTPATIENT
Start: 2017-03-17 | End: 2017-03-17 | Stop reason: SDUPTHER

## 2017-03-17 RX ORDER — ONDANSETRON 4 MG/1
4 TABLET, FILM COATED ORAL EVERY 6 HOURS PRN
Status: DISCONTINUED | OUTPATIENT
Start: 2017-03-17 | End: 2017-03-21 | Stop reason: HOSPADM

## 2017-03-17 RX ORDER — SODIUM CHLORIDE 0.9 % (FLUSH) 0.9 %
1-10 SYRINGE (ML) INJECTION AS NEEDED
Status: CANCELLED | OUTPATIENT
Start: 2017-03-17

## 2017-03-17 RX ORDER — GABAPENTIN 300 MG/1
600 CAPSULE ORAL EVERY 8 HOURS SCHEDULED
Status: DISCONTINUED | OUTPATIENT
Start: 2017-03-17 | End: 2017-03-21 | Stop reason: HOSPADM

## 2017-03-17 RX ORDER — FUROSEMIDE 20 MG/1
20 TABLET ORAL DAILY
Status: DISCONTINUED | OUTPATIENT
Start: 2017-03-18 | End: 2017-03-21 | Stop reason: HOSPADM

## 2017-03-17 RX ORDER — NEOSTIGMINE METHYLSULFATE 1 MG/ML
INJECTION, SOLUTION INTRAVENOUS AS NEEDED
Status: DISCONTINUED | OUTPATIENT
Start: 2017-03-17 | End: 2017-03-17 | Stop reason: SURG

## 2017-03-17 RX ORDER — PROPOFOL 10 MG/ML
VIAL (ML) INTRAVENOUS AS NEEDED
Status: DISCONTINUED | OUTPATIENT
Start: 2017-03-17 | End: 2017-03-17 | Stop reason: SURG

## 2017-03-17 RX ORDER — ATRACURIUM BESYLATE 10 MG/ML
INJECTION, SOLUTION INTRAVENOUS AS NEEDED
Status: DISCONTINUED | OUTPATIENT
Start: 2017-03-17 | End: 2017-03-17 | Stop reason: SURG

## 2017-03-17 RX ORDER — MEPERIDINE HYDROCHLORIDE 25 MG/ML
12.5 INJECTION INTRAMUSCULAR; INTRAVENOUS; SUBCUTANEOUS
Status: DISCONTINUED | OUTPATIENT
Start: 2017-03-17 | End: 2017-03-17 | Stop reason: HOSPADM

## 2017-03-17 RX ORDER — PROMETHAZINE HYDROCHLORIDE 12.5 MG/1
12.5 TABLET ORAL EVERY 6 HOURS PRN
Status: DISCONTINUED | OUTPATIENT
Start: 2017-03-17 | End: 2017-03-21 | Stop reason: HOSPADM

## 2017-03-17 RX ORDER — LIDOCAINE HYDROCHLORIDE 10 MG/ML
INJECTION, SOLUTION EPIDURAL; INFILTRATION; INTRACAUDAL; PERINEURAL AS NEEDED
Status: DISCONTINUED | OUTPATIENT
Start: 2017-03-17 | End: 2017-03-17 | Stop reason: SURG

## 2017-03-17 RX ORDER — BUSPIRONE HYDROCHLORIDE 10 MG/1
10 TABLET ORAL 4 TIMES DAILY PRN
Status: DISCONTINUED | OUTPATIENT
Start: 2017-03-17 | End: 2017-03-21 | Stop reason: HOSPADM

## 2017-03-17 RX ORDER — SODIUM CHLORIDE, SODIUM LACTATE, POTASSIUM CHLORIDE, CALCIUM CHLORIDE 600; 310; 30; 20 MG/100ML; MG/100ML; MG/100ML; MG/100ML
100 INJECTION, SOLUTION INTRAVENOUS CONTINUOUS
Status: DISCONTINUED | OUTPATIENT
Start: 2017-03-17 | End: 2017-03-18

## 2017-03-17 RX ORDER — ALBUMIN, HUMAN INJ 5% 5 %
SOLUTION INTRAVENOUS CONTINUOUS PRN
Status: DISCONTINUED | OUTPATIENT
Start: 2017-03-17 | End: 2017-03-17 | Stop reason: SURG

## 2017-03-17 RX ORDER — LIDOCAINE HYDROCHLORIDE 10 MG/ML
5 INJECTION, SOLUTION EPIDURAL; INFILTRATION; INTRACAUDAL; PERINEURAL ONCE
Status: COMPLETED | OUTPATIENT
Start: 2017-03-17 | End: 2017-03-17

## 2017-03-17 RX ORDER — ONDANSETRON 2 MG/ML
INJECTION INTRAMUSCULAR; INTRAVENOUS AS NEEDED
Status: DISCONTINUED | OUTPATIENT
Start: 2017-03-17 | End: 2017-03-17 | Stop reason: SURG

## 2017-03-17 RX ORDER — ACETAMINOPHEN 325 MG/1
650 TABLET ORAL EVERY 4 HOURS PRN
Status: DISCONTINUED | OUTPATIENT
Start: 2017-03-17 | End: 2017-03-21 | Stop reason: HOSPADM

## 2017-03-17 RX ORDER — OXYCODONE AND ACETAMINOPHEN 7.5; 325 MG/1; MG/1
1 TABLET ORAL EVERY 4 HOURS PRN
Status: DISCONTINUED | OUTPATIENT
Start: 2017-03-17 | End: 2017-03-19

## 2017-03-17 RX ORDER — BACLOFEN 10 MG/1
10 TABLET ORAL 3 TIMES DAILY
Status: DISCONTINUED | OUTPATIENT
Start: 2017-03-17 | End: 2017-03-21 | Stop reason: HOSPADM

## 2017-03-17 RX ORDER — PROMETHAZINE HYDROCHLORIDE 25 MG/1
25 TABLET ORAL ONCE AS NEEDED
Status: DISCONTINUED | OUTPATIENT
Start: 2017-03-17 | End: 2017-03-17 | Stop reason: HOSPADM

## 2017-03-17 RX ORDER — ASPIRIN 81 MG/1
81 TABLET, CHEWABLE ORAL DAILY
Status: DISCONTINUED | OUTPATIENT
Start: 2017-03-18 | End: 2017-03-21 | Stop reason: HOSPADM

## 2017-03-17 RX ORDER — BUPIVACAINE HYDROCHLORIDE AND EPINEPHRINE 2.5; 5 MG/ML; UG/ML
INJECTION, SOLUTION EPIDURAL; INFILTRATION; INTRACAUDAL; PERINEURAL AS NEEDED
Status: DISCONTINUED | OUTPATIENT
Start: 2017-03-17 | End: 2017-03-17 | Stop reason: HOSPADM

## 2017-03-17 RX ADMIN — NEOSTIGMINE METHYLSULFATE 3 MG: 1 INJECTION, SOLUTION INTRAVENOUS at 14:50

## 2017-03-17 RX ADMIN — OXYCODONE HYDROCHLORIDE AND ACETAMINOPHEN 1 TABLET: 7.5; 325 TABLET ORAL at 18:11

## 2017-03-17 RX ADMIN — ATRACURIUM BESYLATE 10 MG: 10 INJECTION, SOLUTION INTRAVENOUS at 12:10

## 2017-03-17 RX ADMIN — EPHEDRINE SULFATE 10 MG: 50 INJECTION INTRAMUSCULAR; INTRAVENOUS; SUBCUTANEOUS at 13:58

## 2017-03-17 RX ADMIN — DEXAMETHASONE SODIUM PHOSPHATE 4 MG: 4 INJECTION, SOLUTION INTRAMUSCULAR; INTRAVENOUS at 11:42

## 2017-03-17 RX ADMIN — POTASSIUM CHLORIDE 10 MEQ: 750 CAPSULE, EXTENDED RELEASE ORAL at 20:45

## 2017-03-17 RX ADMIN — MORPHINE SULFATE 0.6 MG: 5 INJECTION INTRAMUSCULAR; INTRAVENOUS; SUBCUTANEOUS at 13:16

## 2017-03-17 RX ADMIN — ROBINUL 0.2 MG: 0.2 INJECTION INTRAMUSCULAR; INTRAVENOUS at 14:50

## 2017-03-17 RX ADMIN — Medication 750 G: at 11:40

## 2017-03-17 RX ADMIN — MORPHINE SULFATE 1 MG: 5 INJECTION INTRAMUSCULAR; INTRAVENOUS; SUBCUTANEOUS at 13:23

## 2017-03-17 RX ADMIN — BACLOFEN 10 MG: 10 TABLET ORAL at 20:44

## 2017-03-17 RX ADMIN — Medication 1 TABLET: at 18:11

## 2017-03-17 RX ADMIN — EPHEDRINE SULFATE 10 MG: 50 INJECTION INTRAMUSCULAR; INTRAVENOUS; SUBCUTANEOUS at 13:00

## 2017-03-17 RX ADMIN — SODIUM CHLORIDE, POTASSIUM CHLORIDE, SODIUM LACTATE AND CALCIUM CHLORIDE 100 ML/HR: 600; 310; 30; 20 INJECTION, SOLUTION INTRAVENOUS at 11:07

## 2017-03-17 RX ADMIN — HYDROMORPHONE HYDROCHLORIDE 0.5 MG: 1 INJECTION, SOLUTION INTRAMUSCULAR; INTRAVENOUS; SUBCUTANEOUS at 16:09

## 2017-03-17 RX ADMIN — POTASSIUM CHLORIDE 10 MEQ: 750 CAPSULE, EXTENDED RELEASE ORAL at 18:02

## 2017-03-17 RX ADMIN — LIDOCAINE HYDROCHLORIDE 2 ML: 10 INJECTION, SOLUTION EPIDURAL; INFILTRATION; INTRACAUDAL; PERINEURAL at 11:07

## 2017-03-17 RX ADMIN — PROPOFOL 150 MG: 10 INJECTION, EMULSION INTRAVENOUS at 11:34

## 2017-03-17 RX ADMIN — OXYCODONE HYDROCHLORIDE AND ACETAMINOPHEN 1 TABLET: 7.5; 325 TABLET ORAL at 22:41

## 2017-03-17 RX ADMIN — CETIRIZINE HYDROCHLORIDE 10 MG: 10 TABLET, FILM COATED ORAL at 18:02

## 2017-03-17 RX ADMIN — LIDOCAINE HYDROCHLORIDE 40 MG: 10 INJECTION, SOLUTION EPIDURAL; INFILTRATION; INTRACAUDAL; PERINEURAL at 11:34

## 2017-03-17 RX ADMIN — ATRACURIUM BESYLATE 10 MG: 10 INJECTION, SOLUTION INTRAVENOUS at 12:45

## 2017-03-17 RX ADMIN — FENTANYL CITRATE 50 MCG: 50 INJECTION, SOLUTION INTRAMUSCULAR; INTRAVENOUS at 11:34

## 2017-03-17 RX ADMIN — ATRACURIUM BESYLATE 10 MG: 10 INJECTION, SOLUTION INTRAVENOUS at 13:30

## 2017-03-17 RX ADMIN — EPHEDRINE SULFATE 10 MG: 50 INJECTION INTRAMUSCULAR; INTRAVENOUS; SUBCUTANEOUS at 13:57

## 2017-03-17 RX ADMIN — MORPHINE SULFATE 1 MG: 5 INJECTION INTRAMUSCULAR; INTRAVENOUS; SUBCUTANEOUS at 13:28

## 2017-03-17 RX ADMIN — FENTANYL CITRATE 50 MCG: 50 INJECTION, SOLUTION INTRAMUSCULAR; INTRAVENOUS at 11:45

## 2017-03-17 RX ADMIN — FENTANYL CITRATE 50 MCG: 50 INJECTION, SOLUTION INTRAMUSCULAR; INTRAVENOUS at 15:59

## 2017-03-17 RX ADMIN — ALBUMIN HUMAN: 0.05 INJECTION, SOLUTION INTRAVENOUS at 12:55

## 2017-03-17 RX ADMIN — HYDROMORPHONE HYDROCHLORIDE 0.5 MG: 1 INJECTION, SOLUTION INTRAMUSCULAR; INTRAVENOUS; SUBCUTANEOUS at 16:20

## 2017-03-17 RX ADMIN — ONDANSETRON 4 MG: 2 INJECTION INTRAMUSCULAR; INTRAVENOUS at 14:55

## 2017-03-17 RX ADMIN — FAMOTIDINE 20 MG: 20 TABLET ORAL at 11:00

## 2017-03-17 RX ADMIN — GABAPENTIN 600 MG: 300 CAPSULE ORAL at 20:45

## 2017-03-17 RX ADMIN — Medication: at 16:13

## 2017-03-17 RX ADMIN — ATRACURIUM BESYLATE 30 MG: 10 INJECTION, SOLUTION INTRAVENOUS at 11:34

## 2017-03-17 RX ADMIN — Medication 1 TABLET: at 18:02

## 2017-03-17 RX ADMIN — ATRACURIUM BESYLATE 10 MG: 10 INJECTION, SOLUTION INTRAVENOUS at 14:06

## 2017-03-17 RX ADMIN — HYDROMORPHONE HYDROCHLORIDE 0.5 MG: 1 INJECTION, SOLUTION INTRAMUSCULAR; INTRAVENOUS; SUBCUTANEOUS at 16:26

## 2017-03-17 RX ADMIN — BACLOFEN 10 MG: 10 TABLET ORAL at 18:02

## 2017-03-17 RX ADMIN — FENTANYL CITRATE 50 MCG: 50 INJECTION, SOLUTION INTRAMUSCULAR; INTRAVENOUS at 16:04

## 2017-03-17 RX ADMIN — SODIUM CHLORIDE, POTASSIUM CHLORIDE, SODIUM LACTATE AND CALCIUM CHLORIDE 100 ML/HR: 600; 310; 30; 20 INJECTION, SOLUTION INTRAVENOUS at 18:02

## 2017-03-17 RX ADMIN — NIFEDIPINE 30 MG: 30 TABLET, FILM COATED, EXTENDED RELEASE ORAL at 20:45

## 2017-03-17 RX ADMIN — MORPHINE SULFATE 1 MG: 5 INJECTION INTRAMUSCULAR; INTRAVENOUS; SUBCUTANEOUS at 13:34

## 2017-03-17 RX ADMIN — MORPHINE SULFATE 1 MG: 5 INJECTION INTRAMUSCULAR; INTRAVENOUS; SUBCUTANEOUS at 13:20

## 2017-03-17 NOTE — OP NOTE
OPERATIVE REPORT    DATE OF OPERATION: 3/17/17    PREOPERATIVE DIAGNOSIS: Spondylolisthesis with stenosis L3-4, prior PLIF L4 5    POSTOPERATIVE DIAGNOSIS: Same    PROCEDURE PERFORMED: Posterior lumbar interbody fusion with pedicle screw fixation L3-4, removal of pedicle screws L5    SURGEON: Sen Skinner MD    ASSISTANT: Eugenia Mccann PA-C    INDICATIONS: The patient had an L4 5 PLIF 4 years ago with a good result for 3 years and a progressive pain in the back and legs over the past year.  MRI scan showed severe stenosis with spondylolisthesis had developed at L3 4, decompression and fusion was necessary.    DESCRIPTON OF PROCEDURE: Surgery was performed under general anesthesia in the prone position on blanket rolls on the Yefri table with a Tee catheter in place and EMG electrodes placed in the lower extremities for neuromonitoring.  The back was prepared sterilely and draped. A percutaneous bone pin was placed in the right posterior iliac crest and a Stealth stereotactic reference frame attached.  An O-arm CT image was performed and used for stereotactic guidance throughout the remainder of the case.    The stereotactic wand was used to locate the the prior placed pedicle screws at L4 5 bilaterally and the trajectory into the L3 pedicle.  Bilateral paramedian skin incisions were made, the muscle fascia was divided, Bovie dissection was used to divide the muscle down to the prior pedicle screws and imelda on each side, and the setscrews, imelda and pedicle screws at L4 and L5 were removed bilaterally. A  stereotactically guided tap was used to create threaded access into each L3 pedicle. A stereotactically guided the dilator was used to place a guidewire into the pedicle of L3 bilaterally and the guidewire was curved down and attached to the surgical drapes.    On the right side a stereotactic dilator was used to locate the L3-4 facet complex.  Progressive dilators were used to place a 5 cm length x 20 mm  diameter tubular retractor.  Soft tissue over the facet and lamina were removed with a pituitary rongeur. A high speed drill was used to remove the facet complex and right sided lamina.  Bone shavings were saved in a Luken's trap for interbody grafting.  The 3 mm Kerrison punch was used to remove the ligamentum flavum and remaining bone margin to expose the dural sac, epidural veins and foramen.  The epidural veins were coagulated with bipolar and divided with microscissors and epidural fat was removed with the suction.  The L3 exiting nerve root was exposed achieving a full decompression.    Once the disc space lateral to the dura and caudal to the exiting nerve root was exposed and a dural retractor was placed to protect the dura and an annulotomy knife was used to open the disc space. Discectomy with cartilaginous endplate removal was performed using a series of rotating mini and upward angled and side-scraping curettes. The nuclear disc and cartilaginous endplate were removed to expose the bony endplate.  The disc space was distracted up to 12 mm with rotating distractors, and a capstone 12 x 26 mm trial was inserted using stereotactic guidance.  Gelfoam was placed in the foramen for hemostasis.      On the left side a stereotactically guided dilator was used to place a second tubular retractor over the facet complex of L3-4. The facets were removed with a high-speed drill and Kerrison punch.  The ligamentum flavum was removed exposing the dural sac and exiting L3 nerve root and lateral disc the epidural veins were coagulated and divided with microscissors exposing the lateral disc.  The disc space on this side was entered with an annulotomy knife and the rotating mini and curettes used to complete removal of the disc nucleus and cartilaginous endplate.  A funnel was used to fill the disc space on the left side with an interbody graft combination of demineralized bone matrix, Mastergraft, and autograft  bone shavings.  A 12 x 22 mm Capstone cage filled with graft was inserted using a stereotactically guided .  Then on the right side the remaining disc space was filled with graft using the funnel and a second 12 x 22 mm Capstone cage filled with graft was inserted using stereotactic guidance.  Gelfoam was used on both sides for hemostasis.  0.4 mg of Duramorph was injected intradurally using a 26-gauge spinal needle.  Gelfoam with Depo-Medrol was placed over the facetectomy sites on each side after ensuring complete hemostasis.  The tubular retractors were removed on each side    Pedicle screws were then placed at L3 and L4 bilaterally through the previously placed pedicle access holes using stereotactic guidance.  A 5.5 mm x 45 mm screw was placed stereotactically over the guidewire at L3 on the left and a 6.5 x 40 mm screw was placed in the pedicle at L4 on the left.  A 45 mm titanium imelda was delivered into the heads of the screws through the Sextant screw extenders.  Set screws were placed into the screw heads through the screw extenders and tightened down and broken off with compression using the screw extenders.     On the right side 5.5 mm x 45 mm screw was placed in the L3 and a 6.5 mm x 40 mm screw was placed in the L4 pedicles through the screw extenders and a 40 mm imelda was delivered into the heads of the screws.  Set screws were placed in the screw heads through the screw extenders bilaterally and tightened and broken off with compression of the screw extenders..      AP and lateral fluoroscopic images were taken to confirm the position of the interbody grafts and the pedicle screws.  The left sided interbody cage was found to be more posteriorly placed in the right, so the 20 mm x 5 cm tube was replaced on the left and the cage impacted further into the disc space until was confirmed to be even with the right-sided cage using lateral fluoroscopy.  The tube was then removed.  The right iliac pin  was removed area and Marcaine was injected in the muscle at each incision site.  The incisions were closed with Vicryl for the subcutaneous and subcuticular layers and Dermabond Prineo was applied to the skin.  The estimated blood loss was 200 mL.    Sen Skinner M.D.

## 2017-03-17 NOTE — ANESTHESIA POSTPROCEDURE EVALUATION
Patient: Lisa Calloway    Procedure Summary     Date Anesthesia Start Anesthesia Stop Room / Location    03/17/17 1131   [facility] / [facility]       Procedure Diagnosis Surgeon Provider    LUMBAR FUSION DECOMPRESSON WITH PEDICLE SCREWS L3-4  (N/A Spine Lumbar) Spondylolisthesis of lumbar region  (Spondylolisthesis of lumbar region [M43.16]) MD Alfonso Mayfield MD          Anesthesia Type: general  Last vitals  BP (!) 101/38 (03/17/17 1544)    Temp 98.6 °F (37 °C) (03/17/17 1544)    Pulse 95 (03/17/17 1544)   Resp 14 (03/17/17 1544)    SpO2   100%     Anesthesia Post Evaluation

## 2017-03-17 NOTE — INTERVAL H&P NOTE
"Pre-Op H&P (See Recent Office Note Attached)    Chief complaint: Low back pain into LLE    Review of Systems:  General ROS:  no fever, chills, rashes, No change since last office visit  Cardiovascular ROS: no chest pain or dyspnea on exertion.  +cardiac clearance  Respiratory ROS: no cough, + baseline shortness of breath, or wheezing    Immunization History:   Influenza:  Yes 2016  Pneumococcal:  Yes < 5 years  Tetanus:  unknown    Vital Signs:  Visit Vitals   • /82 (BP Location: Right arm, Patient Position: Lying)   • Pulse 94   • Temp 98.6 °F (37 °C) (Temporal Artery )   • Resp 20   • Ht 60\" (152.4 cm)   • Wt 129 lb (58.5 kg)   • SpO2 96%   • BMI 25.19 kg/m2       Physical Exam:    CV:  S1S2 regular rate and rhythm, no murmur               Resp:  Clear to auscultation; respirations regular, even and unlabored    Results Review:    I reviewed the patient's new clinical results.    Nevaeh Chairez, APRN  3/17/2017 11:19 AM    "

## 2017-03-17 NOTE — ANESTHESIA PROCEDURE NOTES
Airway  Urgency: elective    Date/Time: 3/17/2017 11:34 AM  End Time:3/17/2017 11:37 AM  Airway not difficult    General Information and Staff    Patient location during procedure: OR  Anesthesiologist: SANDEE NERI  CRNA: J LUIS DALTON    Indications and Patient Condition  Indications for airway management: airway protection    Preoxygenated: yes  MILS not maintained throughout  Mask difficulty assessment: 1 - vent by mask    Final Airway Details  Final airway type: endotracheal airway      Successful airway: ETT  Cuffed: yes   Successful intubation technique: direct laryngoscopy  Endotracheal tube insertion site: oral  Blade: Marsha  Blade size: #3  ETT size: 7.0 mm  Cormack-Lehane Classification: grade I - full view of glottis  Placement verified by: chest auscultation and capnometry   Inital cuff pressure (cm H2O): 14  Cuff volume (mL): 6  Measured from: lips  ETT to lips (cm): 20  Number of attempts at approach: 1    Additional Comments  Negative epigastric sounds, Breath sound equal bilaterally with symmetric chest rise and fall

## 2017-03-17 NOTE — H&P (VIEW-ONLY)
HISTORY AND PHYSICAL for SURGERY    Referring Provider: Tobin Acosta MD    Chief complaint: Back and leg pain    Subjective .   History of present illness:    The patient is a 77-year-old woman who has a history of progressive back and leg pain over the past year that has become extremely severe in the past 7-8 weeks, to the point that now she is taking hydrocodone 7.5 at least 3 times a day. She had an L4 5 PLIF in January 2013, 4 years ago for spondylolisthesis with stenosis. I last saw her in August 2015 with symptoms of increasing back pain and findings on MRI scan of early facet hypertrophy at L3 4 and a 2 mm spondylolisthesis at L3 4. She has tried physical therapy in the past and actually felt worse with the attempts then seeing any improvement. She has had a left hip replacement in 2014 and had a staph infection afterwards. She is now nearly incapacitated by the level of pain she's having although she's functional when she is at a dose of hydrocodone.       History  Past Medical History   Diagnosis Date   • Calcinosis-Raynaud's sclerodactyly-telangiectasia syndrome    • Diastolic heart failure 12/9/2016     Echocardiogram, 08/10/2011: LVH, LVEF 70% and no significant valvular abnormality.  Pharmacologic MPS, 07/28/2011: normal, LVEF 80%. Recurrent chest pain/CHF presentations to the [place], [place] area. Cardiac catheterization, 05/23/2012: Normal coronary arteries, LVEF 65%, LVEDP 32 mmHg. Renal angiography, 05/23/2012: normal bilateral renal arteries.  Echocardiogram for worsening dyspnea, 03/ • History of stomach ulcers    • Lumbar herniated disc      with right-sided sciatica:Status post lumbar fusion, 01/17/2013.      Past Surgical History   Procedure Laterality Date   • Other surgical history       arthrodesis lumbar   • Cardiac surgery     • Hip surgery     • Other surgical history  1982     neuroplasty decompression median nerve at carpal tunnel   • Tonsillectomy  1963   • Tubal abdominal  ligation  1975   • Appendectomy  1975   • Cholecystectomy  1996   • Hysterectomy  1999    family history includes Cancer in her other; Congenital heart disease in her mother; Gout in her other; Hypertension in her mother; Stroke in her mother.   Social History     Social History   • Marital status:      Spouse name: N/A   • Number of children: N/A   • Years of education: N/A     Social History Main Topics   • Smoking status: Never Smoker   • Smokeless tobacco: Not on file   • Alcohol use No   • Drug use: No   • Sexual activity: Defer     Other Topics Concern   • Not on file     Social History Narrative       Allergies:  Amlodipine; Atorvastatin; Beta adrenergic blockers; Codeine; Coreg [carvedilol]; Ergotamine-caffeine; Lisinopril; Metoprolol; Other; Tenormin [atenolol]; Trovan [trovafloxacin]; Cefprozil; Contrast dye; Fish-derived products; Penicillins; Sectral [acebutolol hcl]; Sulfa antibiotics; and Tetracycline    Objective   Review of Systems  Constitutional: Positive for activity change, appetite change and diaphoresis. Negative for chills, fatigue, fever and unexpected weight change.   HENT: Positive for sinus pressure. Negative for congestion, dental problem, drooling, ear discharge, ear pain, facial swelling, hearing loss, mouth sores, nosebleeds, postnasal drip, rhinorrhea, sneezing, sore throat, tinnitus, trouble swallowing and voice change.   Eyes: Negative for photophobia, pain, discharge, redness, itching and visual disturbance.   Respiratory: Positive for shortness of breath. Negative for apnea, cough, choking, chest tightness, wheezing and stridor.   Cardiovascular: Positive for leg swelling. Negative for chest pain and palpitations.   Gastrointestinal: Negative for abdominal distention, abdominal pain, anal bleeding, blood in stool, constipation, diarrhea, nausea, rectal pain and vomiting.   Endocrine: Negative for cold intolerance, heat intolerance, polydipsia, polyphagia and polyuria.      Genitourinary: Negative for decreased urine volume, difficulty urinating, dysuria, enuresis, flank pain, frequency, genital sores, hematuria and urgency.   Musculoskeletal: Positive for back pain. Negative for arthralgias, gait problem, joint swelling, myalgias, neck pain and neck stiffness.   Skin: Negative for color change, pallor, rash and wound.   Allergic/Immunologic: Negative for environmental allergies, food allergies and immunocompromised state.   Neurological: Positive for weakness and light-headedness. Negative for dizziness, tremors, seizures, syncope, facial asymmetry, speech difficulty, numbness and headaches.   Hematological: Negative for adenopathy. Does not bruise/bleed easily.   Psychiatric/Behavioral: Positive for sleep disturbance. Negative for agitation, behavioral problems, confusion, decreased concentration, dysphoric mood, hallucinations, self-injury and suicidal ideas. The patient is hyperactive. The patient is not nervous/anxious.     Physical Exam:  NEUROLOGICAL EXAMINATION:      MENTAL STATUS: Alert and oriented. Speech intact. Recent and remote memory intact.       CRANIAL NERVES:  Cranial nerve II: Visual fields are full to confrontation.  Cranial nerves III, IV and VI: PERRLADC. Extraocular movements are intact. Nystagmus is not present.  Cranial nerve V: Facial sensation is intact to light touch.  Cranial nerve VII: Muscles of facial expression reveal no asymmetry.  Cranial nerve VIII: Hearing is intact to finger rub bilaterally.  Cranial nerves IX and X: Palate elevates symmetrically.  Cranial nerve XI: Shoulder shrug is intact.  Cranial nerve XII: Tongue is midline without evidence of atrophy or fasciculation.     MUSCULOSKELETAL: SLR negative. Vishal's test negative.     MOTOR: Deltoid, biceps, triceps, and  strength intact. No hand atrophy. Hip flexion, knee extension, ankle dorsiflexion and plantar flexion intact. No tremor, spasticity, ataxia, or dysmetria.     SENSATION:  Intact to touch upper and lower extremities. Position sense intact.     REFLEXES: DTR trace and equal in both knees and ankles.      Results Review:  Lumbar MRI scan on 1/27/17 shows severe spinal stenosis now at L3 4 with a grade 1 spondylolisthesis. The remaining spinal levels are unremarkable. There is a PLIF at L4 5 that looks fine and a minor disc bulge at L5-S1.    Assessment/Plan   Assessment     Severely symptomatic stenosis with spondylolisthesis L3 4, prior PLIF L4 5 for years previously.        Plan     PLIF with pedicle screw fixation and decompression L3-4.      Eugenia Mccann PA-C  On behalf of Sen Skinner MD  03/16/17  9:52 AM

## 2017-03-17 NOTE — BRIEF OP NOTE
LUMBAR LAMINECTOMY POSTERIOR LUMBAR INTERBODY FUSION  Procedure Note    Lisa Calloway  3/17/2017    Pre-op Diagnosis:   Spondylolisthesis of lumbar region [M43.16]    Post-op Diagnosis:     Post-Op Diagnosis Codes:     * Spondylolisthesis of lumbar region [M43.16]    Procedure/CPT® Codes:      Procedure(s):  LUMBAR FUSION DECOMPRESSON WITH PEDICLE SCREWS L3-4     Surgeon(s):  Sen Skinner MD    Anesthesia: General    Staff:   Circulator: Marcellus Ortiz RN; Staci Norris RN  Scrub Person: Mila Monteiro; Ashley Aburto; Nate Bill  Vendor Representative: Nikos Gutiérrez  Nursing Assistant: Jerrod Stewart  Assistant: Eugenia Mccann PA-C    Estimated Blood Loss: 100 mL  Urine Voided: 115 mL    Specimens:                * No specimens in log *      Drains:   Urethral Catheter 03/17/17 1140 100% silicone 16 10 10 (Active)           Findings: Spondylolisthesis with stenosis L3-4    Complications: None      Sen Skinner MD     Date: 3/17/2017  Time: 2:57 PM

## 2017-03-17 NOTE — PLAN OF CARE
Problem: Patient Care Overview (Adult)  Goal: Plan of Care Review  Outcome: Ongoing (interventions implemented as appropriate)    03/17/17 1932   Coping/Psychosocial Response Interventions   Plan Of Care Reviewed With patient   Patient Care Overview   Progress no change   Outcome Evaluation   Outcome Summary/Follow up Plan Patient's vital signs are stable and now pain has been controled.

## 2017-03-17 NOTE — ANESTHESIA PREPROCEDURE EVALUATION
Anesthesia Evaluation     Patient summary reviewed and Nursing notes reviewed   NPO Status: > 8 hours   Airway   Mallampati: II  TM distance: >3 FB  Neck ROM: full  no difficulty expected  Dental      Pulmonary    (+) asthma, shortness of breath (MORENO and COPD),   COPD: MDI.  Cardiovascular     ECG reviewed    (+) hypertension, PVD (Raynauds),     ROS comment: Echo (8/10/2011): LVH. LVEF 70%. No significant valvular abnormality.   nuclear stress (/2011): No ischemia. LVEF 80%.  Cath 2012 Normal coronary arteries, LVEF 65%, LVEDP 32 mmHg.  Echo for dyspnea (3/2016): LVEF >65%. No significant valvular abnormalities. RVSP 43 mmHg.   GXT (3/18//2016) No ischemic changes. Normal     Neuro/Psych  (+) psychiatric history (buspar),    GI/Hepatic/Renal/Endo    (+)  hiatal hernia, GERD,     Musculoskeletal     Abdominal    Substance History      OB/GYN          Other   (+) arthritis                         Anesthesia Plan    ASA 3     general     intravenous induction   Anesthetic plan and risks discussed with patient.    Plan discussed with CRNA.

## 2017-03-18 ENCOUNTER — APPOINTMENT (OUTPATIENT)
Dept: GENERAL RADIOLOGY | Facility: HOSPITAL | Age: 77
End: 2017-03-18

## 2017-03-18 LAB
HCT VFR BLD AUTO: 28.6 % (ref 34.5–44)
HGB BLD-MCNC: 9.3 G/DL (ref 11.5–15.5)

## 2017-03-18 PROCEDURE — 25010000002 HYDROMORPHONE PER 4 MG: Performed by: NEUROLOGICAL SURGERY

## 2017-03-18 PROCEDURE — 94799 UNLISTED PULMONARY SVC/PX: CPT

## 2017-03-18 PROCEDURE — 85018 HEMOGLOBIN: CPT | Performed by: NEUROLOGICAL SURGERY

## 2017-03-18 PROCEDURE — 97165 OT EVAL LOW COMPLEX 30 MIN: CPT

## 2017-03-18 PROCEDURE — 97110 THERAPEUTIC EXERCISES: CPT | Performed by: PHYSICAL THERAPIST

## 2017-03-18 PROCEDURE — 99024 POSTOP FOLLOW-UP VISIT: CPT | Performed by: NEUROLOGICAL SURGERY

## 2017-03-18 PROCEDURE — 72100 X-RAY EXAM L-S SPINE 2/3 VWS: CPT

## 2017-03-18 PROCEDURE — 97162 PT EVAL MOD COMPLEX 30 MIN: CPT | Performed by: PHYSICAL THERAPIST

## 2017-03-18 PROCEDURE — 97530 THERAPEUTIC ACTIVITIES: CPT

## 2017-03-18 PROCEDURE — 85014 HEMATOCRIT: CPT | Performed by: NEUROLOGICAL SURGERY

## 2017-03-18 RX ORDER — HYDROCHLOROTHIAZIDE 12.5 MG/1
12.5 TABLET ORAL DAILY
Status: DISCONTINUED | OUTPATIENT
Start: 2017-03-18 | End: 2017-03-21 | Stop reason: HOSPADM

## 2017-03-18 RX ORDER — HYDROMORPHONE HYDROCHLORIDE 1 MG/ML
0.5 INJECTION, SOLUTION INTRAMUSCULAR; INTRAVENOUS; SUBCUTANEOUS
Status: DISCONTINUED | OUTPATIENT
Start: 2017-03-18 | End: 2017-03-21 | Stop reason: HOSPADM

## 2017-03-18 RX ADMIN — NIFEDIPINE 30 MG: 30 TABLET, FILM COATED, EXTENDED RELEASE ORAL at 05:23

## 2017-03-18 RX ADMIN — SODIUM CHLORIDE, POTASSIUM CHLORIDE, SODIUM LACTATE AND CALCIUM CHLORIDE 100 ML/HR: 600; 310; 30; 20 INJECTION, SOLUTION INTRAVENOUS at 06:14

## 2017-03-18 RX ADMIN — GABAPENTIN 600 MG: 300 CAPSULE ORAL at 14:29

## 2017-03-18 RX ADMIN — Medication: at 01:44

## 2017-03-18 RX ADMIN — NIFEDIPINE 30 MG: 30 TABLET, FILM COATED, EXTENDED RELEASE ORAL at 20:57

## 2017-03-18 RX ADMIN — HYDROCODONE BITARTRATE AND ACETAMINOPHEN 1 TABLET: 7.5; 325 TABLET ORAL at 04:12

## 2017-03-18 RX ADMIN — TEMAZEPAM 15 MG: 15 CAPSULE ORAL at 01:56

## 2017-03-18 RX ADMIN — HYDROCODONE BITARTRATE AND ACETAMINOPHEN 1 TABLET: 7.5; 325 TABLET ORAL at 20:56

## 2017-03-18 RX ADMIN — GABAPENTIN 600 MG: 300 CAPSULE ORAL at 05:23

## 2017-03-18 RX ADMIN — BACLOFEN 10 MG: 10 TABLET ORAL at 20:57

## 2017-03-18 RX ADMIN — HYDROCHLOROTHIAZIDE 12.5 MG: 12.5 TABLET ORAL at 08:42

## 2017-03-18 RX ADMIN — HYDROMORPHONE HYDROCHLORIDE 0.5 MG: 1 INJECTION, SOLUTION INTRAMUSCULAR; INTRAVENOUS; SUBCUTANEOUS at 11:42

## 2017-03-18 RX ADMIN — OXYCODONE HYDROCHLORIDE AND ACETAMINOPHEN 1 TABLET: 7.5; 325 TABLET ORAL at 10:37

## 2017-03-18 RX ADMIN — CETIRIZINE HYDROCHLORIDE 10 MG: 10 TABLET, FILM COATED ORAL at 08:06

## 2017-03-18 RX ADMIN — CELECOXIB 200 MG: 200 CAPSULE ORAL at 17:58

## 2017-03-18 RX ADMIN — Medication 1 TABLET: at 08:05

## 2017-03-18 RX ADMIN — ASPIRIN 81 MG CHEWABLE TABLET 81 MG: 81 TABLET CHEWABLE at 08:05

## 2017-03-18 RX ADMIN — PANTOPRAZOLE SODIUM 40 MG: 40 TABLET, DELAYED RELEASE ORAL at 05:23

## 2017-03-18 RX ADMIN — CELECOXIB 200 MG: 200 CAPSULE ORAL at 08:05

## 2017-03-18 RX ADMIN — POTASSIUM CHLORIDE 10 MEQ: 750 CAPSULE, EXTENDED RELEASE ORAL at 08:06

## 2017-03-18 RX ADMIN — POTASSIUM CHLORIDE 10 MEQ: 750 CAPSULE, EXTENDED RELEASE ORAL at 20:56

## 2017-03-18 RX ADMIN — Medication 1 TABLET: at 08:06

## 2017-03-18 RX ADMIN — DOCUSATE SODIUM AND SENNOSIDES 1 TABLET: 8.6; 5 TABLET, FILM COATED ORAL at 21:02

## 2017-03-18 RX ADMIN — GABAPENTIN 600 MG: 300 CAPSULE ORAL at 20:57

## 2017-03-18 RX ADMIN — NIFEDIPINE 30 MG: 30 TABLET, FILM COATED, EXTENDED RELEASE ORAL at 14:30

## 2017-03-18 RX ADMIN — ACETAMINOPHEN 650 MG: 325 TABLET, FILM COATED ORAL at 12:32

## 2017-03-18 RX ADMIN — BACLOFEN 10 MG: 10 TABLET ORAL at 14:29

## 2017-03-18 RX ADMIN — FUROSEMIDE 20 MG: 20 TABLET ORAL at 08:05

## 2017-03-18 RX ADMIN — POTASSIUM CHLORIDE 10 MEQ: 750 CAPSULE, EXTENDED RELEASE ORAL at 15:05

## 2017-03-18 RX ADMIN — HYDROMORPHONE HYDROCHLORIDE 0.5 MG: 1 INJECTION, SOLUTION INTRAMUSCULAR; INTRAVENOUS; SUBCUTANEOUS at 15:11

## 2017-03-18 RX ADMIN — BACLOFEN 10 MG: 10 TABLET ORAL at 08:05

## 2017-03-18 NOTE — PROGRESS NOTES
"NEUROSURGERY PROGRESS NOTE    Vital Signs  Blood pressure 130/64, pulse 85, temperature 97.9 °F (36.6 °C), temperature source Tympanic, resp. rate 16, height 60\" (152.4 cm), weight 129 lb (58.5 kg), SpO2 99 %.    Interval History:   POD #1: lumbar laminectomy L3-4 with PLIF/pedicle screw fixation.    Doing well.  Tee catheter out. Taking diet without difficulty.  Pain well controlled.    Hemoglobin 9.3.  Hematocrit 28.lood pressure 130/64.    Physical therapy with ambulation will begin today.  We'll need transfer to [facility] in [facility] on discharge, as she has nobody at home to help with her postoperative care. Will need transfer by ambulance.      ASSESSMENT: POD #1:L3-4 PLIF with pedicle screws.  Doing well.  Expect discharge Monday or Tuesday, depending upon when transfer arrangements can be madefor postoperative nursing home care.    PLAN: DC IV and PCA.  Continue IV with saline lock.  Ambulate with physical therapy. Case management to arrange postoperative transfer to nursing home care until fully independent.    Sen Skinner MD  03/18/17  8:00 AM    "

## 2017-03-18 NOTE — PLAN OF CARE
Problem: Patient Care Overview (Adult)  Goal: Plan of Care Review  Outcome: Ongoing (interventions implemented as appropriate)    03/18/17 1411   Coping/Psychosocial Response Interventions   Plan Of Care Reviewed With patient   Patient Care Overview   Progress improving   Outcome Evaluation   Outcome Summary/Follow up Plan PT eval complete; pt presents with evolving signs and symptoms s/p L3-4 PLIF and deficits in strength, balance, functional mobility, and activity tolerance; pt will benefit from skilled PT intervention to address deficits and assist pt in returning to PLOF.          Problem: Inpatient Physical Therapy  Goal: Bed Mobility Goal LTG- PT  Outcome: Ongoing (interventions implemented as appropriate)    03/18/17 1411   Bed Mobility PT LTG   Bed Mobility PT LTG, Date Established 03/18/17   Bed Mobility PT LTG, Time to Achieve 5 days   Bed Mobility PT LTG, Activity Type roll left/roll right;supine to sit/sit to supine   Bed Mobility PT LTG, [place] Level conditional independence   Bed Mobility PT Goal LTG, Assist Device bed rails   Bed Mobility PT LTG, Date Goal Reviewed 03/18/17   Bed Mobility PT LTG, Outcome goal ongoing       Goal: Transfer Training Goal 1 LTG- PT  Outcome: Ongoing (interventions implemented as appropriate)    03/18/17 1411   Transfer Training PT LTG   Transfer Training PT LTG, Date Established 03/18/17   Transfer Training PT LTG, Time to Achieve 5 days   Transfer Training PT LTG, Activity Type bed to chair /chair to bed;sit to stand/stand to sit   Transfer Training PT LTG, [place] Level supervision required   Transfer Training PT LTG, Assist Device walker, rolling   Transfer Training PT LTG, Date Goal Reviewed 03/18/17   Transfer Training PT LTG, Outcome goal ongoing       Goal: Gait Training Goal LTG- PT  Outcome: Ongoing (interventions implemented as appropriate)    03/18/17 1411   Gait Training PT LTG   Gait Training Goal PT LTG, Date Established 03/18/17   Gait Training  Goal PT LTG, Time to Achieve 5 days   Gait Training Goal PT LTG, [place] Level supervision required   Gait Training Goal PT LTG, Assist Device walker, rolling   Gait Training Goal PT LTG, Distance to Achieve 250   Gait Training Goal PT LTG, Date Goal Reviewed 03/18/17   Gait Training Goal PT LTG, Outcome goal ongoing       Goal: Patient Education Goal LTG- PT  Outcome: Ongoing (interventions implemented as appropriate)    03/18/17 1411   Patient Education PT LTG   Patient Education PT LTG, Date Established 03/18/17   Patient Education PT LTG, Time to Achieve 5 days   Patient Education PT LTG, Education Type precaution per surgeon;brace use/care;posture/body mechanics;gait;transfers;bed mobility;progression of POC;benefits of activity   Patient Education PT LTG, Education Understanding demonstrate adequately;verbalize understanding   Patient Education PT LTG, Date Goal Reviewed 03/18/17   Patient Education PT LTG Outcome goal ongoing

## 2017-03-18 NOTE — PLAN OF CARE
Problem: Patient Care Overview (Adult)  Goal: Plan of Care Review  Outcome: Ongoing (interventions implemented as appropriate)    03/18/17 1519   Coping/Psychosocial Response Interventions   Plan Of Care Reviewed With patient   Outcome Evaluation   Outcome Summary/Follow up Plan OT eval complete. Pt S/P PLIF L3-4. Pt educated in back precautions and issued AE for LBB/LBD. Pt will benefit from OT to advance pt toward increased with ADLs and fucntional mobiltiy         Problem: Inpatient Occupational Therapy  Goal: Bed Mobility Goal LTG- OT  Outcome: Ongoing (interventions implemented as appropriate)    03/18/17 1519   Bed Mobility OT LTG   Bed Mobility OT LTG, Date Established 03/18/17   Bed Mobility OT LTG, Time to Achieve by discharge   Bed Mobility OT LTG, Activity Type supine to sit/sit to supine   Bed Mobility OT LTG, [place] Level supervision required   Bed Mobility OT LTG, Assist Device bed rails       Goal: Bathing Goal LTG- OT  Outcome: Ongoing (interventions implemented as appropriate)    03/18/17 1519   Bathing OT LTG   Bathing Goal OT LTG, Date Established 03/18/17   Bathing Goal OT LTG, Time to Achieve by discharge   Bathing Goal OT LTG, Activity Type simulates;lower body bathing   Bathing Goal OT LTG, Assist Device sponge, long handled       Goal: LB Dressing Goal LTG- OT  Outcome: Ongoing (interventions implemented as appropriate)    03/18/17 1519   LB Dressing OT LTG   LB Dressing Goal OT LTG, Date Established 03/18/17   LB Dressing Goal OT LTG, Time to Achieve by discharge   LB Dressing Goal OT LTG, [place] Level minimum assist (75% patient effort)   LB Dressing Goal OT LTG, Adaptive Equipment reacher;shoe horn, long handled;sock-aid       Goal: Functional Mobility Goal LTG- OT  Outcome: Ongoing (interventions implemented as appropriate)    03/18/17 1519   Functional Mobility OT LTG   Functional Mobility Goal OT LTG, Date Established 03/18/17   Functional Mobility Goal OT LTG, Time to  Achieve by discharge   Functional Mobility Goal OT LTG, [place] Level supervision   Functional Mobility Goal OT LTG, Assist Device rolling walker   Functional Mobility Goal OT LTG, Distance to Achieve in hallway;to the bathroom

## 2017-03-18 NOTE — PROGRESS NOTES
Acute Care - Physical Therapy Initial Evaluation  [facility]     Patient Name: Lisa Calloway  : 1940  MRN: [id number]  Today's Date: 3/18/2017   Onset of Illness/Injury or Date of Surgery Date: 17  Date of Referral to PT: 17  Referring Physician: Brody      Admit Date: 3/17/2017     Visit Dx:    ICD-10-CM ICD-9-CM   1. Impaired functional mobility, balance, gait, and endurance Z74.09 V49.89   2. Acquired spondylolisthesis M43.10 738.4     Patient Active Problem List   Diagnosis   • Abnormal mammogram   • Essential hypertension   • Osteoarthritis (arthritis due to wear and tear of joints)   • Raynaud disease   • Anxiety   • COPD (chronic obstructive pulmonary disease)   • GERD (gastroesophageal reflux disease)   • Hip pain   • Chronic diastolic heart failure   • Palpitations   • Polypharmacy   • Spondylolisthesis, lumbar region   • Spinal stenosis, lumbar region, with neurogenic claudication   • Spondylolisthesis, acquired   • Acquired spondylolisthesis     Past Medical History   Diagnosis Date   • Anxiety    • Arthritis    • Calcinosis-Raynaud's sclerodactyly-telangiectasia syndrome    • COPD (chronic obstructive pulmonary disease)    • Diastolic heart failure 2016     Echocardiogram, 08/10/2011: LVH, LVEF 70% and no significant valvular abnormality.  Pharmacologic MPS, 2011: normal, LVEF 80%. Recurrent chest pain/CHF presentations to the [place], [place] area. Cardiac catheterization, 2012: Normal coronary arteries, LVEF 65%, LVEDP 32 mmHg. Renal angiography, 2012: normal bilateral renal arteries.  Echocardiogram for worsening dyspnea,  GERD (gastroesophageal reflux disease)    • Heart murmur    • Hiatal hernia    • High cholesterol    • History of recurrent UTIs    • History of staph infection      after hip replacement   • History of stomach ulcers    • Hypertension    • Low back pain    • Lumbar herniated disc      with right-sided sciatica:Status post  lumbar fusion, 01/17/2013.   • Panic attacks    • SOB (shortness of breath)    • Wears dentures      upper denture and partial   • Wears eyeglasses      Past Surgical History   Procedure Laterality Date   • Other surgical history       arthrodesis lumbar   • Hip surgery     • Other surgical history  1982     neuroplasty decompression median nerve at carpal tunnel   • Tonsillectomy  1963   • Tubal abdominal ligation  1975   • Appendectomy  1975   • Cholecystectomy  1996   • Hysterectomy  1999   • Eye surgery       cataract surgery   • Cardiac surgery       cardiac cath   • Joint replacement       hip left          PT ASSESSMENT (last 72 hours)      PT Evaluation       03/18/17 1308 03/18/17 0750    Rehab Evaluation    Document Type evaluation  -     Subjective Information agree to therapy;complains of;pain;fatigue  -     Patient Effort, Rehab Treatment good  -     Symptoms Noted During/After Treatment increased pain;fatigue  -     General Information    Patient Profile Review yes  -JM     Onset of Illness/Injury or Date of Surgery Date 03/17/17  -     Referring Physician Brody  -     General Observations Pt lying in bed watching television; O2 via NC  -     Pertinent History Of Current Problem Pt with increasing neurogenic pain and decreased functoin r/t spondylolisthesis; admitted for PLIF L3-4.    -     Precautions/Limitations brace on when up;spinal precautions   Pt has LSO brace  -     Prior Level of Function independent:;all household mobility;community mobility;transfer;ADL's;home management   Increased time required d/t increased pain when up  -     Equipment Currently Used at Home walker, rolling;cane, straight  -     Plans/Goals Discussed With patient  -     Risks Reviewed patient:;LOB;nausea/vomiting;dizziness;increased discomfort;change in vital signs  -     Benefits Reviewed patient:;improve function;increase independence;increase strength;increase balance;decrease pain  -      Barriers to Rehab none identified  -     Living Environment    Lives With alone  -     Living Arrangements house  -     Home Accessibility stairs to enter home  -     Number of Stairs to Enter Home 3  -JM     Stair Railings at Home outside, present on left side  -     Clinical Impression    Date of Referral to PT 03/17/17  -     PT Diagnosis Impaired mobility  -     Patient/Family Goals Statement Return home when able  -     Criteria for Skilled Therapeutic Interventions Met yes;treatment indicated  -     Impairments Found (describe specific impairments) aerobic capacity/endurance;gait, locomotion, and balance;muscle performance  -     Rehab Potential good, to achieve stated therapy goals  -     Pain Assessment    Pain Assessment 0-10  -     Pain Score 3  -JM     Post Pain Score 6  -JM     Pain Type Surgical pain  -     Pain Location Back  -     Pain Orientation Lower  -     Pain Intervention(s) Medication (See MAR);Repositioned  -     Response to Interventions Content  -     Cognitive Assessment/Intervention    Current Cognitive/Communication Assessment functional  -     Orientation Status oriented x 4  -JM     Follows Commands/Answers Questions 100% of the time  -     Personal Safety WNL/WFL  -     Personal Safety Interventions gait belt;fall prevention program maintained;nonskid shoes/slippers when out of bed  -     ROM (Range of Motion)    General ROM no range of motion deficits identified  -     MMT (Manual Muscle Testing)    General MMT Assessment no strength deficits identified  -     Muscle Tone Assessment    Muscle Tone Assessment  Bilateral Upper Extremities;Bilateral Lower Extremities  -SF    Bilateral Upper Extremities Muscle Tone Assessment  other (see comments)   WNL  -SF    Bilateral Lower Extremities Muscle Tone Assessment  mildly decreased tone  -SF    Bed Mobility, Assessment/Treatment    Bed Mobility, Assistive Device bed rails  -     Bed Mobility,  Roll Left, [place] minimum assist (75% patient effort)  -     Bed Mob, Supine to Sit, [place] minimum assist (75% patient effort);verbal cues required  -     Bed Mobility, Comment Able to log roll with Daniella  -     Transfer Assessment/Treatment    Transfers, Sit-Stand [place] contact guard assist;verbal cues required  -     Transfers, Stand-Sit [place] contact guard assist  -     Transfers, Sit-Stand-Sit, Assist Device rolling walker  -     Gait Assessment/Treatment    Gait, [place] Level contact guard assist  -     Gait, Assistive Device rolling walker  -     Gait, Distance (Feet) 150   2 sitting rest breaks to achieve 150 ft  -     Gait, Gait Pattern Analysis swing-through gait  -     Gait, Gait Deviations esther decreased  -     Gait, Comment Pt reports fatigue and dizziness with ambulation requiring sitting rest  -     Sensory Assessment/Intervention    Light Touch  LUE;LLE  -SF    LUE Light Touch  mild impairment  -SF    LLE Light Touch  mild impairment  -SF    Positioning and Restraints    Pre-Treatment Position in bed  -     Post Treatment Position chair  -     In Chair notified nsg;sitting;call light within reach;encouraged to call for assist;with OT  -       03/17/17 2130 03/17/17 1708    Muscle Tone Assessment    Muscle Tone Assessment Bilateral Upper Extremities;Bilateral Lower Extremities  -TC Bilateral Upper Extremities;Bilateral Lower Extremities  -KW (r) SF (t) KW (c)    Bilateral Upper Extremities Muscle Tone Assessment other (see comments)   WNL  -TC other (see comments)   WNL  -KW (r) SF (t) KW (c)    Bilateral Lower Extremities Muscle Tone Assessment mildly decreased tone  -TC mildly decreased tone  -KW (r) SF (t) KW (c)    Sensory Assessment/Intervention    Light Touch LUE;LLE  -TC LUE;LLE  -KW (r) SF (t) KW (c)    LUE Light Touch WNL  -TC mild impairment  -KW (r) SF (t) KW (c)    LLE Light Touch WNL  -TC mild impairment  -KW (r) SF (t) KW  (c)      03/17/17 1119 03/16/17 1629    General Information    Equipment Currently Used at Home cane, straight  -RS cane, straight   walker with brakes  -MB    Living Environment    Lives With alone  -RS alone  -MB    Living Arrangements house  -RS house  -MB    Home Accessibility stairs to enter home  -RS stairs to enter home  -MB    Number of Stairs to Enter Home 3  -RS 3  -MB    Stair Railings at Home none  -RS     Type of Financial/Environmental Concern none  -RS     Transportation Available car;family or friend will provide  -RS car  -MB      User Key  (r) = Recorded By, (t) = Taken By, (c) = Cosigned By    Initials Name Provider Type    HENRY Paniagua, PT Physical Therapist    MB Nevaeh Gutierrez, RN Registered Nurse    VIKRAM Weinstein, RN Registered Nurse    MADONNA Hicks, RN Registered Nurse    SF Nataliya Jones, RN Registered Nurse    RS Shelby Tubbs, RN Registered Nurse          Physical Therapy Education     Title: PT OT SLP Therapies (Done)     Topic: Physical Therapy (Done)     Point: Mobility training (Done)    Learning Progress Summary    Learner Readiness Method Response Comment Documented by Status   Patient Acceptance E VU,NR   03/18/17 1410 Done               Point: Home exercise program (Done)    Learning Progress Summary    Learner Readiness Method Response Comment Documented by Status   Patient Acceptance E LUZ,NR   03/18/17 1410 Done               Point: Body mechanics (Done)    Learning Progress Summary    Learner Readiness Method Response Comment Documented by Status   Patient Acceptance E LUZ,NR   03/18/17 1410 Done               Point: Precautions (Done)    Learning Progress Summary    Learner Readiness Method Response Comment Documented by Status   Patient Acceptance E LUZ,NR   03/18/17 1410 Done                      User Key     Initials Effective Dates Name Provider Type [facility] 06/19/15 -  Byron Paniagua, PT Physical Therapist PT                PT  Recommendation and Plan  Planned Therapy Interventions: balance training, bed mobility training, gait training, patient/family education, strengthening, transfer training, stair training  PT Frequency: daily, per priority policy  Plan of Care Review  Plan Of Care Reviewed With: patient  Progress: improving  Outcome Summary/Follow up Plan: PT eval complete; pt presents with evolving signs and symptoms s/p L3-4 PLIF and deficits in strength, balance, functional mobility, and activity tolerance; pt will benefit from skilled PT intervention to address deficits and assist pt in returning to PLOF.            IP PT Goals       03/18/17 1411          Bed Mobility PT LTG    Bed Mobility PT LTG, Date Established 03/18/17  -      Bed Mobility PT LTG, Time to Achieve 5 days  -      Bed Mobility PT LTG, Activity Type roll left/roll right;supine to sit/sit to supine  -      Bed Mobility PT LTG, [place] Level conditional independence  -      Bed Mobility PT Goal  LTG, Assist Device bed rails  -      Bed Mobility PT LTG, Date Goal Reviewed 03/18/17  -      Bed Mobility PT LTG, Outcome goal ongoing  -      Transfer Training PT LTG    Transfer Training PT LTG, Date Established 03/18/17  -      Transfer Training PT LTG, Time to Achieve 5 days  -      Transfer Training PT LTG, Activity Type bed to chair /chair to bed;sit to stand/stand to sit  -      Transfer Training PT LTG, [place] Level supervision required  -      Transfer Training PT LTG, Assist Device walker, rolling  -JM      Transfer Training PT  LTG, Date Goal Reviewed 03/18/17  -      Transfer Training PT LTG, Outcome goal ongoing  -      Gait Training PT LTG    Gait Training Goal PT LTG, Date Established 03/18/17  -      Gait Training Goal PT LTG, Time to Achieve 5 days  -JM      Gait Training Goal PT LTG, [place] Level supervision required  -      Gait Training Goal PT LTG, Assist Device walker, rolling  -JM      Gait Training  Goal PT LTG, Distance to Achieve 250  -      Gait Training Goal PT LTG, Date Goal Reviewed 03/18/17  -      Gait Training Goal PT LTG, Outcome goal ongoing  -      Patient Education PT LTG    Patient Education PT LTG, Date Established 03/18/17  -      Patient Education PT LTG, Time to Achieve 5 days  -      Patient Education PT LTG, Education Type precaution per surgeon;brace use/care;posture/body mechanics;gait;transfers;bed mobility;progression of POC;benefits of activity  -      Patient Education PT LTG, Education Understanding demonstrate adequately;verbalize understanding  -      Patient Education PT LTG, Date Goal Reviewed 03/18/17  -      Patient Education PT LTG Outcome goal ongoing  -        User Key  (r) = Recorded By, (t) = Taken By, (c) = Cosigned By    Initials Name Provider Type    HENRY Paniagua, PT Physical Therapist                Outcome Measures       03/18/17 1308          How much help from another person do you currently need...    Turning from your back to your side while in flat bed without using bedrails? 3  -JM      Moving from lying on back to sitting on the side of a flat bed without bedrails? 3  -JM      Moving to and from a bed to a chair (including a wheelchair)? 3  -JM      Standing up from a chair using your arms (e.g., wheelchair, bedside chair)? 3  -JM      Climbing 3-5 steps with a railing? 2  -JM      To walk in hospital room? 3  -JM      AM-PAC 6 Clicks Score 17  -      Functional Assessment    Outcome Measure Options AM-PAC 6 Clicks Basic Mobility (PT)  -        User Key  (r) = Recorded By, (t) = Taken By, (c) = Cosigned By    Initials Name Provider Type    HENRY Paniagua, PT Physical Therapist           Time Calculation:         PT Charges       03/18/17 1414          Time Calculation    Start Time 1308  -      PT Received On 03/18/17  -      PT Goal Re-Cert Due Date 03/28/17  -      Time Calculation- PT    Total Timed Code Minutes- PT 15  minute(s)  -HENRY        User Key  (r) = Recorded By, (t) = Taken By, (c) = Cosigned By    Initials Name Provider Type    HENRY Paniagua, PT Physical Therapist          Therapy Charges for Today     Code Description Service Date Service Provider Modifiers Qty    96255926289 HC PT EVAL MOD COMPLEXITY 3 3/18/2017 Byron Paniagua, PT GP 1    51208606918 HC PT THER PROC EA 15 MIN 3/18/2017 Byron Paniagua, PT GP 1          PT G-Codes  Outcome Measure Options: AM-PAC 6 Clicks Basic Mobility (PT)      Byron Paniagua, PT  3/18/2017

## 2017-03-18 NOTE — PROGRESS NOTES
Acute Care - Occupational Therapy Initial Evaluation  [facility]     Patient Name: Lisa Calloway  : 1940  MRN: [id number]  Today's Date: 3/18/2017  Onset of Illness/Injury or Date of Surgery Date: 17  Date of Referral to OT: 17  Referring Physician: Brody    Admit Date: 3/17/2017       ICD-10-CM ICD-9-CM   1. Impaired functional mobility, balance, gait, and endurance Z74.09 V49.89   2. Acquired spondylolisthesis M43.10 738.4   3. Impaired mobility and ADLs Z74.09 799.89     Patient Active Problem List   Diagnosis   • Abnormal mammogram   • Essential hypertension   • Osteoarthritis (arthritis due to wear and tear of joints)   • Raynaud disease   • Anxiety   • COPD (chronic obstructive pulmonary disease)   • GERD (gastroesophageal reflux disease)   • Hip pain   • Chronic diastolic heart failure   • Palpitations   • Polypharmacy   • Spondylolisthesis, lumbar region   • Spinal stenosis, lumbar region, with neurogenic claudication   • Spondylolisthesis, acquired   • Acquired spondylolisthesis     Past Medical History   Diagnosis Date   • Anxiety    • Arthritis    • Calcinosis-Raynaud's sclerodactyly-telangiectasia syndrome    • COPD (chronic obstructive pulmonary disease)    • Diastolic heart failure 2016     Echocardiogram, 08/10/2011: LVH, LVEF 70% and no significant valvular abnormality.  Pharmacologic MPS, 2011: normal, LVEF 80%. Recurrent chest pain/CHF presentations to the [place], [place] area. Cardiac catheterization, 2012: Normal coronary arteries, LVEF 65%, LVEDP 32 mmHg. Renal angiography, 2012: normal bilateral renal arteries.  Echocardiogram for worsening dyspnea,  GERD (gastroesophageal reflux disease)    • Heart murmur    • Hiatal hernia    • High cholesterol    • History of recurrent UTIs    • History of staph infection      after hip replacement   • History of stomach ulcers    • Hypertension    • Low back pain    • Lumbar herniated disc      with  right-sided sciatica:Status post lumbar fusion, 01/17/2013.   • Panic attacks    • SOB (shortness of breath)    • Wears dentures      upper denture and partial   • Wears eyeglasses      Past Surgical History   Procedure Laterality Date   • Other surgical history       arthrodesis lumbar   • Hip surgery     • Other surgical history  1982     neuroplasty decompression median nerve at carpal tunnel   • Tonsillectomy  1963   • Tubal abdominal ligation  1975   • Appendectomy  1975   • Cholecystectomy  1996   • Hysterectomy  1999   • Eye surgery       cataract surgery   • Cardiac surgery       cardiac cath   • Joint replacement       hip left          OT ASSESSMENT FLOWSHEET (last 72 hours)      OT Evaluation       03/18/17 1308 03/18/17 1307 03/18/17 0750 03/17/17 2130 03/17/17 1708    Rehab Evaluation    Document Type evaluation  - evaluation  -       Subjective Information agree to therapy;complains of;pain;fatigue  - agree to therapy;complains of;dizziness  -AC       Patient Effort, Rehab Treatment good  - good  -AC       Symptoms Noted During/After Treatment increased pain;fatigue  - increased pain;dizziness;fatigue  -AC       General Information    Patient Profile Review yes  - yes  -AC       Onset of Illness/Injury or Date of Surgery Date 03/17/17  -JM 03/17/17  -AC       Referring Physician Brody  -HENRY Skinner  -       General Observations Pt lying in bed watching television; O2 via NC  - Pt in bed.  Back brace rep in room educated pt in use of brace.   -AC       Pertinent History Of Current Problem Pt with increasing neurogenic pain and decreased functoin r/t spondylolisthesis; admitted for PLIF L3-4.    - S/P PLIF L3-4  -AC       Precautions/Limitations brace on when up;spinal precautions   Pt has LSO brace  - spinal precautions;brace on when up   LSO brace on when up   -AC       Prior Level of Function independent:;all household mobility;community mobility;transfer;ADL's;home management    Increased time required d/t increased pain when up  - --   increased pain and requires extended time to complete ADLs  -       Equipment Currently Used at Home walker, rolling;cane, straight  - cane, straight;walker, rolling  -       Plans/Goals Discussed With patient  -JM patient;agreed upon  -       Risks Reviewed patient:;LOB;nausea/vomiting;dizziness;increased discomfort;change in vital signs  - patient:;LOB;increased discomfort  -       Benefits Reviewed patient:;improve function;increase independence;increase strength;increase balance;decrease pain  - patient:;improve function;increase independence;increase balance;increase knowledge  -       Barriers to Rehab none identified  - none identified  -       Living Environment    Lives With alone  - alone  -       Living Arrangements house  - house  -AC       Home Accessibility stairs to enter home  - stairs to enter home  -       Number of Stairs to Enter Home 3  - 3  -AC       Stair Railings at Home outside, present on left side  - outside, present on left side  -AC       Clinical Impression    Date of Referral to OT  03/17/17  -       OT Diagnosis  Decreased independence with self care  -       Patient/Family Goals Statement  Pt would like to increase independence with self care and mobility  -       Criteria for Skilled Therapeutic Interventions Met  yes;treatment indicated  -       Rehab Potential  good, to achieve stated therapy goals  -AC       Therapy Frequency  daily  -AC       Anticipated Equipment Needs At Discharge  --   issued AE kit for LBB/LBD  -       Anticipated Discharge Disposition  inpatient rehabilitation facility  -AC       Functional Level Prior    Ambulation     1-->assistive equipment  -SF    Transferring     0-->independent  -SF    Toileting     0-->independent  -SF    Dressing     0-->independent  -SF    Eating     0-->independent  -SF    Communication     0-->understands/communicates  without difficulty  -SF    Swallowing     0-->swallows foods/liquids without difficulty  -SF    Prior Functional Level Comment     no  -SF    Pain Assessment    Pain Assessment 0-10  -JM 0-10  -AC       Pain Score 3  -JM 3  -AC       Post Pain Score 6  -JM 6  -AC       Pain Type Surgical pain  -JM Surgical pain  -AC       Pain Location Back  -JM Back  -AC       Pain Orientation Lower  -JM        Pain Intervention(s) Medication (See MAR);Repositioned  -JM Medication (See MAR);Repositioned  -AC       Response to Interventions Content  -JM tolerated  -AC       Vision Assessment/Intervention    Visual Impairment  WNL  -AC       Cognitive Assessment/Intervention    Current Cognitive/Communication Assessment functional  -JM functional  -AC       Orientation Status oriented x 4  -JM oriented x 4  -AC       Follows Commands/Answers Questions 100% of the time  -% of the time  -AC       Personal Safety WNL/WFL  -JM WNL/WFL  -AC       Personal Safety Interventions gait belt;fall prevention program maintained;nonskid shoes/slippers when out of bed  - fall prevention program maintained;gait belt;nonskid shoes/slippers when out of bed  -AC       ROM (Range of Motion)    General ROM no range of motion deficits identified  -JM no range of motion deficits identified  -AC       MMT (Manual Muscle Testing)    General MMT Assessment no strength deficits identified  - no strength deficits identified  -AC       Muscle Tone Assessment    Muscle Tone Assessment   Bilateral Upper Extremities;Bilateral Lower Extremities  -SF Bilateral Upper Extremities;Bilateral Lower Extremities  -TC Bilateral Upper Extremities;Bilateral Lower Extremities  -KW (r) SF (t) KW (c)    Bilateral Upper Extremities Muscle Tone Assessment   other (see comments)   WNL  -SF other (see comments)   WNL  -TC other (see comments)   WNL  -KW (r) SF (t) KW (c)    Bilateral Lower Extremities Muscle Tone Assessment   mildly decreased tone  -SF mildly decreased tone   -TC mildly decreased tone  -KW (r) SF (t) KW (c)    Bed Mobility, Assessment/Treatment    Bed Mobility, Assistive Device bed rails  - bed rails  -       Bed Mobility, Roll Left, [place] minimum assist (75% patient effort)  - minimum assist (75% patient effort)  -       Bed Mob, Supine to Sit, [place] minimum assist (75% patient effort);verbal cues required  - verbal cues required;minimum assist (75% patient effort)   log roll  -       Bed Mobility, Comment Able to log roll with Daniella  -        Transfer Assessment/Treatment    Transfers, Sit-Stand [place] contact guard assist;verbal cues required  - verbal cues required  -       Transfers, Stand-Sit [place] contact guard assist  - contact guard assist  -       Transfers, Sit-Stand-Sit, Assist Device rolling walker  - rolling walker  -       Functional Mobility    Functional Mobility- Ind. Level  verbal cues required  -       Functional Mobility- Device  rolling walker  -       Functional Mobility-Distance (Feet)  150  -       Functional Mobility- Comment  pt with dizziness and fatigue with ambulation and required 2 sitting restbreaks  -       Lower Body Bathing Assessment/Training    LB Bathing Assess/Train, Comment  issued LH sponge  -       Lower Body Dressing Assessment/Training    LB Dressing Assess/Train, Clothing Type  doffing:;donning:  -       LB Dressing Assess/Train, Assist Device  reacher;sock-aid  -       LB Dressing Assess/Train, Position  sitting  -       LB Dressing Assess/Train, [place]  moderate assist (50% patient effort)  -       Sensory Assessment/Intervention    Light Touch   LUE;LLE  -SF LUE;LLE  -TC LUE;LLE  -KW (r) SF (t) KW (c)    LUE Light Touch   mild impairment  -SF WNL  -TC mild impairment  -KW (r) SF (t) KW (c)    LLE Light Touch   mild impairment  -SF WNL  -TC mild impairment  -KW (r) SF (t) KW (c)    General Therapy Interventions    Planned Therapy Interventions  ADL  retraining;adaptive equipment training;transfer training;balance training  -       Positioning and Restraints    Pre-Treatment Position in bed  - in bed  -       Post Treatment Position chair  - chair  -       In Chair notified nsg;sitting;call light within reach;encouraged to call for assist;with OT  -JM notified nsg;call light within reach;encouraged to call for assist  -AC         03/17/17 1119 03/16/17 3236             General Information    Equipment Currently Used at Home cane, straight  -RS cane, straight   walker with brakes  -MB       Living Environment    Lives With alone  -RS alone  -MB       Living Arrangements house  -RS house  -MB       Home Accessibility stairs to enter home  -RS stairs to enter home  -MB       Number of Stairs to Enter Home 3  -RS 3  -MB       Stair Railings at Home none  -RS        Type of Financial/Environmental Concern none  -RS        Transportation Available car;family or friend will provide  -RS car  -MB       Functional Level Prior    Ambulation 1-->assistive equipment  -RS 1-->assistive equipment  -MB       Transferring 0-->independent  -RS 0-->independent  -MB       Toileting 0-->independent  -RS 0-->independent  -MB       Bathing 0-->independent  -RS 0-->independent  -MB       Dressing 0-->independent  -RS 0-->independent  -MB       Eating 0-->independent  -RS 0-->independent  -MB       Communication 0-->understands/communicates without difficulty  -RS 0-->understands/communicates without difficulty  -MB       Swallowing 0-->swallows foods/liquids without difficulty  -RS 0-->swallows foods/liquids without difficulty  -MB         User Key  (r) = Recorded By, (t) = Taken By, (c) = Cosigned By    Initials Name Effective Dates    AC Florinda Joshua, OT 06/23/15 -     JM Byron Paniagua, PT 06/19/15 -     MB Nevaeh Gutierrez, RN 06/16/16 -     KW Anny Weinstein, JAGDEEP 06/16/16 -     TC Chas Hicks, RN 10/17/16 -     SF Nataliya Jones, RN 02/23/17 -     RS Shelby DIMAS  Arley RN 01/20/17 -            Occupational Therapy Education     Title: PT OT SLP Therapies (Active)     Topic: Occupational Therapy (Active)     Point: ADL training (Done)    Description: Instruct learner(s) on proper safety adaptation and remediation techniques during self care or transfers.   Instruct in proper use of assistive devices.    Learning Progress Summary    Learner Readiness Method Response Comment Documented by Status   Patient Acceptance E,D VU,NR Educated in back precautions and use of AE for LBB/LBD  03/18/17 1515 Done                      User Key     Initials Effective Dates Name Provider Type Discipline     06/23/15 -  Florinda Joshua OT Occupational Therapist OT                  OT Recommendation and Plan  Anticipated Equipment Needs At Discharge:  (issued AE kit for LBB/LBD)  Anticipated Discharge Disposition: inpatient rehabilitation facility  Planned Therapy Interventions: ADL retraining, adaptive equipment training, transfer training, balance training  Therapy Frequency: daily  Plan of Care Review  Plan Of Care Reviewed With: patient  Outcome Summary/Follow up Plan: OT eval complete. Pt S/P PLIF L3-4.  Pt educated in back precautions and issued AE for LBB/LBD.  Pt will benefit from OT to advance pt toward increased with ADLs and fucntional mobiltiy          OT Goals       03/18/17 1519          Bed Mobility OT LTG    Bed Mobility OT LTG, Date Established 03/18/17  -      Bed Mobility OT LTG, Time to Achieve by discharge  -      Bed Mobility OT LTG, Activity Type supine to sit/sit to supine  -      Bed Mobility OT LTG, [place] Level supervision required  -      Bed Mobility OT LTG, Assist Device bed rails  -AC      Bathing OT LTG    Bathing Goal OT LTG, Date Established 03/18/17  -      Bathing Goal OT LTG, Time to Achieve by discharge  -      Bathing Goal OT LTG, Activity Type simulates;lower body bathing  -      Bathing Goal OT LTG, Assist Device sponge, long  handled  -AC      LB Dressing OT LTG    LB Dressing Goal OT LTG, Date Established 03/18/17  -AC      LB Dressing Goal OT LTG, Time to Achieve by discharge  -AC      LB Dressing Goal OT LTG, [place] Level minimum assist (75% patient effort)  -AC      LB Dressing Goal OT LTG, Adaptive Equipment reacher;shoe horn, long handled;sock-aid  -AC      Functional Mobility OT LTG    Functional Mobility Goal OT LTG, Date Established 03/18/17  -AC      Functional Mobility Goal OT LTG, Time to Achieve by discharge  -AC      Functional Mobility Goal OT LTG, [place] Level supervision  -AC      Functional Mobility Goal OT LTG, Assist Device rolling walker  -AC      Functional Mobility Goal OT LTG, Distance to Achieve in hallway;to the bathroom  -        User Key  (r) = Recorded By, (t) = Taken By, (c) = Cosigned By    Initials Name Provider Type    LACY Joshua OT Occupational Therapist                Outcome Measures       03/18/17 1308 03/18/17 1307       How much help from another person do you currently need...    Turning from your back to your side while in flat bed without using bedrails? 3  -JM      Moving from lying on back to sitting on the side of a flat bed without bedrails? 3  -JM      Moving to and from a bed to a chair (including a wheelchair)? 3  -JM      Standing up from a chair using your arms (e.g., wheelchair, bedside chair)? 3  -JM      Climbing 3-5 steps with a railing? 2  -JM      To walk in hospital room? 3  -JM      AM-PAC 6 Clicks Score 17  -JM      How much help from another is currently needed...    Putting on and taking off regular lower body clothing?  2  -AC     Bathing (including washing, rinsing, and drying)  2  -AC     Toileting (which includes using toilet bed pan or urinal)  3  -AC     Putting on and taking off regular upper body clothing  3  -AC     Taking care of personal grooming (such as brushing teeth)  4  -AC     Eating meals  4  -AC     Score  18  -AC     Functional  Assessment    Outcome Measure Options AM-PAC 6 Clicks Basic Mobility (PT)  -HENRY AM-PAC 6 Clicks Daily Activity (OT)  -LACY       User Key  (r) = Recorded By, (t) = Taken By, (c) = Cosigned By    Initials Name Provider Type    AC Florinda Joshua, OT Occupational Therapist    HENRY Paniagua, PT Physical Therapist          Time Calculation:   OT Start Time: 1307    Therapy Charges for Today     Code Description Service Date Service Provider Modifiers Qty    [id number]  OT EVAL LOW COMPLEXITY 3 3/18/2017 Florinda Joshua, OT GO 1    [id number]  OT THERAPEUTIC ACT EA 15 MIN 3/18/2017 Florinda Joshua OT GO 1               Florinda Joshua OT  3/18/2017

## 2017-03-18 NOTE — PLAN OF CARE
Problem: Patient Care Overview (Adult)  Goal: Plan of Care Review  Outcome: Ongoing (interventions implemented as appropriate)    03/18/17 1934   Coping/Psychosocial Response Interventions   Plan Of Care Reviewed With patient   Patient Care Overview   Progress improving   Outcome Evaluation   Outcome Summary/Follow up Plan Patient walked to the bathroom today after her back brace arrived. Patient is still on 2L of oxygen nc.

## 2017-03-19 PROCEDURE — 99024 POSTOP FOLLOW-UP VISIT: CPT | Performed by: NEUROLOGICAL SURGERY

## 2017-03-19 PROCEDURE — 25010000002 HYDROMORPHONE PER 4 MG: Performed by: NEUROLOGICAL SURGERY

## 2017-03-19 PROCEDURE — 94799 UNLISTED PULMONARY SVC/PX: CPT

## 2017-03-19 PROCEDURE — 97110 THERAPEUTIC EXERCISES: CPT | Performed by: PHYSICAL THERAPIST

## 2017-03-19 PROCEDURE — 97116 GAIT TRAINING THERAPY: CPT | Performed by: PHYSICAL THERAPIST

## 2017-03-19 RX ORDER — OXYCODONE AND ACETAMINOPHEN 10; 325 MG/1; MG/1
1 TABLET ORAL EVERY 4 HOURS PRN
Status: DISCONTINUED | OUTPATIENT
Start: 2017-03-19 | End: 2017-03-20

## 2017-03-19 RX ORDER — HYDROCODONE BITARTRATE AND ACETAMINOPHEN 10; 325 MG/1; MG/1
1 TABLET ORAL EVERY 6 HOURS PRN
Status: DISCONTINUED | OUTPATIENT
Start: 2017-03-19 | End: 2017-03-21 | Stop reason: HOSPADM

## 2017-03-19 RX ORDER — BISACODYL 10 MG
10 SUPPOSITORY, RECTAL RECTAL DAILY
Status: DISCONTINUED | OUTPATIENT
Start: 2017-03-19 | End: 2017-03-21 | Stop reason: HOSPADM

## 2017-03-19 RX ADMIN — Medication 1 TABLET: at 08:39

## 2017-03-19 RX ADMIN — BACLOFEN 10 MG: 10 TABLET ORAL at 08:39

## 2017-03-19 RX ADMIN — POTASSIUM CHLORIDE 10 MEQ: 750 CAPSULE, EXTENDED RELEASE ORAL at 22:02

## 2017-03-19 RX ADMIN — DOCUSATE SODIUM AND SENNOSIDES 1 TABLET: 8.6; 5 TABLET, FILM COATED ORAL at 22:43

## 2017-03-19 RX ADMIN — PANTOPRAZOLE SODIUM 40 MG: 40 TABLET, DELAYED RELEASE ORAL at 06:45

## 2017-03-19 RX ADMIN — ASPIRIN 81 MG CHEWABLE TABLET 81 MG: 81 TABLET CHEWABLE at 08:38

## 2017-03-19 RX ADMIN — CELECOXIB 200 MG: 200 CAPSULE ORAL at 08:38

## 2017-03-19 RX ADMIN — HYDROCODONE BITARTRATE AND ACETAMINOPHEN 1 TABLET: 7.5; 325 TABLET ORAL at 06:45

## 2017-03-19 RX ADMIN — HYDROMORPHONE HYDROCHLORIDE 0.5 MG: 1 INJECTION, SOLUTION INTRAMUSCULAR; INTRAVENOUS; SUBCUTANEOUS at 03:09

## 2017-03-19 RX ADMIN — HYDROCODONE BITARTRATE AND ACETAMINOPHEN 1 TABLET: 10; 325 TABLET ORAL at 18:54

## 2017-03-19 RX ADMIN — CETIRIZINE HYDROCHLORIDE 10 MG: 10 TABLET, FILM COATED ORAL at 08:39

## 2017-03-19 RX ADMIN — POTASSIUM CHLORIDE 10 MEQ: 750 CAPSULE, EXTENDED RELEASE ORAL at 15:33

## 2017-03-19 RX ADMIN — NIFEDIPINE 30 MG: 30 TABLET, FILM COATED, EXTENDED RELEASE ORAL at 06:45

## 2017-03-19 RX ADMIN — BISACODYL 10 MG: 10 SUPPOSITORY RECTAL at 22:43

## 2017-03-19 RX ADMIN — FLUTICASONE PROPIONATE 1 SPRAY: 50 SPRAY, METERED NASAL at 08:44

## 2017-03-19 RX ADMIN — NIFEDIPINE 30 MG: 30 TABLET, FILM COATED, EXTENDED RELEASE ORAL at 13:14

## 2017-03-19 RX ADMIN — OXYCODONE HYDROCHLORIDE AND ACETAMINOPHEN 1 TABLET: 10; 325 TABLET ORAL at 13:14

## 2017-03-19 RX ADMIN — GABAPENTIN 600 MG: 300 CAPSULE ORAL at 13:14

## 2017-03-19 RX ADMIN — HYDROCHLOROTHIAZIDE 12.5 MG: 12.5 TABLET ORAL at 08:38

## 2017-03-19 RX ADMIN — CELECOXIB 200 MG: 200 CAPSULE ORAL at 17:44

## 2017-03-19 RX ADMIN — BISACODYL 10 MG: 10 SUPPOSITORY RECTAL at 17:44

## 2017-03-19 RX ADMIN — GABAPENTIN 600 MG: 300 CAPSULE ORAL at 06:45

## 2017-03-19 RX ADMIN — BACLOFEN 10 MG: 10 TABLET ORAL at 22:02

## 2017-03-19 RX ADMIN — OXYCODONE HYDROCHLORIDE AND ACETAMINOPHEN 1 TABLET: 10; 325 TABLET ORAL at 08:39

## 2017-03-19 RX ADMIN — POTASSIUM CHLORIDE 10 MEQ: 750 CAPSULE, EXTENDED RELEASE ORAL at 08:39

## 2017-03-19 RX ADMIN — HYDROCODONE BITARTRATE AND ACETAMINOPHEN 1 TABLET: 10; 325 TABLET ORAL at 22:43

## 2017-03-19 RX ADMIN — HYDROCODONE BITARTRATE AND ACETAMINOPHEN 1 TABLET: 10; 325 TABLET ORAL at 14:10

## 2017-03-19 RX ADMIN — Medication 1 TABLET: at 08:38

## 2017-03-19 RX ADMIN — DOCUSATE SODIUM AND SENNOSIDES 1 TABLET: 8.6; 5 TABLET, FILM COATED ORAL at 14:10

## 2017-03-19 RX ADMIN — GABAPENTIN 600 MG: 300 CAPSULE ORAL at 22:02

## 2017-03-19 RX ADMIN — NIFEDIPINE 30 MG: 30 TABLET, FILM COATED, EXTENDED RELEASE ORAL at 22:02

## 2017-03-19 RX ADMIN — BACLOFEN 10 MG: 10 TABLET ORAL at 15:33

## 2017-03-19 NOTE — PROGRESS NOTES
Acute Care - Physical Therapy Treatment Note  [facility]     Patient Name: Lisa Calloway  : 1940  MRN: [id number]  Today's Date: 3/19/2017  Onset of Illness/Injury or Date of Surgery Date: 17  Date of Referral to PT: 17  Referring Physician: Brody    Admit Date: 3/17/2017    Visit Dx:    ICD-10-CM ICD-9-CM   1. Impaired functional mobility, balance, gait, and endurance Z74.09 V49.89   2. Acquired spondylolisthesis M43.10 738.4   3. Impaired mobility and ADLs Z74.09 799.89     Patient Active Problem List   Diagnosis   • Abnormal mammogram   • Essential hypertension   • Osteoarthritis (arthritis due to wear and tear of joints)   • Raynaud disease   • Anxiety   • COPD (chronic obstructive pulmonary disease)   • GERD (gastroesophageal reflux disease)   • Hip pain   • Chronic diastolic heart failure   • Palpitations   • Polypharmacy   • Spondylolisthesis, lumbar region   • Spinal stenosis, lumbar region, with neurogenic claudication   • Spondylolisthesis, acquired   • Acquired spondylolisthesis               Adult Rehabilitation Note       17 1025          Rehab Assessment/Intervention    Discipline physical therapist  -      Document Type therapy note (daily note)  -      Subjective Information agree to therapy;complains of;pain  -      Patient Effort, Rehab Treatment good  -      Symptoms Noted During/After Treatment dizziness;increased pain  -      Precautions/Limitations spinal precautions;fall precautions;brace on when up   LSO  -JM      Recorded by [JM] Byron Paniagua, PT      Vital Signs    Pre Systolic BP Rehab 141  -JM      Pre Treatment Diastolic BP 67  -JM      Post Systolic BP Rehab 123  -JM      Post Treatment Diastolic BP 49  -JM      Pretreatment Heart Rate (beats/min) 105  -JM      Posttreatment Heart Rate (beats/min) 105  -JM      Recorded by [JM] Byron Paniagua, PT      Pain Assessment    Pain Assessment 0-10  -JM      Pain Score 6  -JM      Post Pain Score 7  -JM       Pain Type Surgical pain  -JM      Pain Location Back  -JM      Pain Orientation Lower  -JM      Pain Intervention(s) Medication (See MAR);Repositioned  -JM      Response to Interventions Tolerated  -JM      Recorded by [HENRY] Byron Paniagua, PT      Cognitive Assessment/Intervention    Current Cognitive/Communication Assessment functional  -      Orientation Status oriented x 4  -      Follows Commands/Answers Questions 100% of the time  -      Personal Safety WNL/WFL  -      Recorded by [HENRY] Byron Paniagua, PT      Bed Mobility, Assessment/Treatment    Bed Mobility, Assistive Device bed rails  -      Bed Mobility, Roll Right, [place] supervision required;verbal cues required  -      Bed Mob, Supine to Sit, [place] supervision required;verbal cues required  -      Recorded by [HENRY] Byron Paniagua PT      Transfer Assessment/Treatment    Transfers, Sit-Stand [place] supervision required  -      Transfers, Stand-Sit [place] supervision required  -      Transfers, Sit-Stand-Sit, Assist Device rolling walker  -      Toilet Transfer, [place] contact guard assist  -      Toilet Transfer, Assistive Device --   grab bar  -      Recorded by [HENRY] Byron Paniagua PT      Gait Assessment/Treatment    Gait, [place] Level contact guard assist;verbal cues required  -      Gait, Assistive Device rolling walker  -      Gait, Distance (Feet) --   75x2; 1 sitting rest break x2 min  -      Gait, Gait Pattern Analysis swing-through gait  -      Gait, Gait Deviations step length decreased  -      Recorded by [HENRY] Byron Paniagua PT      Positioning and Restraints    Pre-Treatment Position in bed  -      Post Treatment Position chair  -      In Chair notified nsg;reclined;call light within reach;encouraged to call for assist;with nsg   LSO brace in place  -      Recorded by [HENRY] Byron Paniagua, PT        User Key  (r) = Recorded By, (t) = Taken By, (c) = Cosigned By     Initials Name Effective Dates     Byron Paniagua, PT 06/19/15 -                 IP PT Goals       03/19/17 1100 03/18/17 1411       Bed Mobility PT LTG    Bed Mobility PT LTG, Date Established  03/18/17  -     Bed Mobility PT LTG, Time to Achieve  5 days  -     Bed Mobility PT LTG, Activity Type  roll left/roll right;supine to sit/sit to supine  -     Bed Mobility PT LTG, [place] Level  conditional independence  -     Bed Mobility PT Goal  LTG, Assist Device  bed rails  -     Bed Mobility PT LTG, Date Goal Reviewed 03/19/17  - 03/18/17  -     Bed Mobility PT LTG, Outcome goal ongoing  - goal ongoing  -     Transfer Training PT LTG    Transfer Training PT LTG, Date Established  03/18/17  -     Transfer Training PT LTG, Time to Achieve  5 days  -     Transfer Training PT LTG, Activity Type  bed to chair /chair to bed;sit to stand/stand to sit  -     Transfer Training PT LTG, [place] Level  supervision required  -     Transfer Training PT LTG, Assist Device  walker, rolling  -     Transfer Training PT  LTG, Date Goal Reviewed 03/19/17  - 03/18/17  -     Transfer Training PT LTG, Outcome goal ongoing  - goal ongoing  -     Gait Training PT LTG    Gait Training Goal PT LTG, Date Established  03/18/17  -     Gait Training Goal PT LTG, Time to Achieve  5 days  -     Gait Training Goal PT LTG, [place] Level  supervision required  -     Gait Training Goal PT LTG, Assist Device  walker, rolling  -     Gait Training Goal PT LTG, Distance to Achieve  250  -JM     Gait Training Goal PT LTG, Date Goal Reviewed 03/19/17  - 03/18/17  -     Gait Training Goal PT LTG, Outcome goal ongoing  - goal ongoing  -     Patient Education PT LTG    Patient Education PT LTG, Date Established  03/18/17  -     Patient Education PT LTG, Time to Achieve  5 days  -     Patient Education PT LTG, Education Type  precaution per surgeon;brace use/care;posture/body  mechanics;gait;transfers;bed mobility;progression of POC;benefits of activity  -     Patient Education PT LTG, Education Understanding  demonstrate adequately;verbalize understanding  -     Patient Education PT LTG, Date Goal Reviewed 03/19/17  - 03/18/17  -     Patient Education PT LTG Outcome goal ongoing  - goal ongoing  -       User Key  (r) = Recorded By, (t) = Taken By, (c) = Cosigned By    Initials Name Provider Type    HENRY Paniagua, PT Physical Therapist          Physical Therapy Education     Title: PT OT SLP Therapies (Active)     Topic: Physical Therapy (Done)     Point: Mobility training (Done)    Learning Progress Summary    Learner Readiness Method Response Comment Documented by Status   Patient Acceptance E VU,NR   03/19/17 1100 Done    Acceptance E VU,NR   03/18/17 1410 Done               Point: Home exercise program (Done)    Learning Progress Summary    Learner Readiness Method Response Comment Documented by Status   Patient Acceptance E VU,NR   03/19/17 1100 Done    Acceptance E VU,NR   03/18/17 1410 Done               Point: Body mechanics (Done)    Learning Progress Summary    Learner Readiness Method Response Comment Documented by Status   Patient Acceptance E VU,NR   03/19/17 1100 Done    Acceptance E VU,NR   03/18/17 1410 Done               Point: Precautions (Done)    Learning Progress Summary    Learner Readiness Method Response Comment Documented by Status   Patient Acceptance E VU,NR   03/19/17 1100 Done    Acceptance E VU,NR   03/18/17 1410 Done                      User Key     Initials Effective Dates Name Provider Type [facility] 06/19/15 -  Byron Paniagua, PT Physical Therapist PT                    PT Recommendation and Plan  Planned Therapy Interventions: balance training, bed mobility training, gait training, patient/family education, strengthening, transfer training, stair training  PT Frequency: daily, per priority policy  Plan of Care  Review  Plan Of Care Reviewed With: patient  Progress: improving  Outcome Summary/Follow up Plan: Pt with increased ambulation distance and increased IND with transfers/bed mobility.          Outcome Measures       03/19/17 1005 03/18/17 1308 03/18/17 1307    How much help from another person do you currently need...    Turning from your back to your side while in flat bed without using bedrails? 3  -JM 3  -JM     Moving from lying on back to sitting on the side of a flat bed without bedrails? 3  -JM 3  -JM     Moving to and from a bed to a chair (including a wheelchair)? 3  -JM 3  -JM     Standing up from a chair using your arms (e.g., wheelchair, bedside chair)? 3  -JM 3  -JM     Climbing 3-5 steps with a railing? 2  -JM 2  -JM     To walk in hospital room? 3  -JM 3  -JM     AM-PAC 6 Clicks Score 17  -JM 17  -JM     How much help from another is currently needed...    Putting on and taking off regular lower body clothing?   2  -AC    Bathing (including washing, rinsing, and drying)   2  -AC    Toileting (which includes using toilet bed pan or urinal)   3  -AC    Putting on and taking off regular upper body clothing   3  -AC    Taking care of personal grooming (such as brushing teeth)   4  -AC    Eating meals   4  -AC    Score   18  -AC    Functional Assessment    Outcome Measure Options AM-PAC 6 Clicks Basic Mobility (PT)  - AM-PAC 6 Clicks Basic Mobility (PT)  - AM-PAC 6 Clicks Daily Activity (OT)  -      User Key  (r) = Recorded By, (t) = Taken By, (c) = Cosigned By    Initials Name Provider Type    AC Florinda Joshua, OT Occupational Therapist    HENRY Paniagua, PT Physical Therapist           Time Calculation:         PT Charges       03/19/17 1103          Time Calculation    Start Time 1005  -JM      PT Received On 03/19/17  -      PT Goal Re-Cert Due Date 03/28/17  -      Time Calculation- PT    Total Timed Code Minutes- PT 27 minute(s)  -        User Key  (r) = Recorded By, (t) = Taken By, (c)  = Cosigned By    Initials Name Provider Type    HENRY Paniagua, PT Physical Therapist          Therapy Charges for Today     Code Description Service Date Service Provider Modifiers Qty    62550647649 HC PT EVAL MOD COMPLEXITY 3 3/18/2017 Byron Paniagua, PT GP 1    61887313194 HC PT THER PROC EA 15 MIN 3/18/2017 Byron Paniagua, PT GP 1    64592883246 HC PT THER PROC EA 15 MIN 3/19/2017 Byron Paniagua, PT GP 1    58043727725 HC GAIT TRAINING EA 15 MIN 3/19/2017 Byron Paniagua, PT GP 1    85382252192 HC PT THER SUPP EA 15 MIN 3/19/2017 Byron Paniagua, PT GP 2          PT G-Codes  Outcome Measure Options: AM-PAC 6 Clicks Basic Mobility (PT)    Byron Paniagua, PT  3/19/2017

## 2017-03-19 NOTE — PLAN OF CARE
Problem: Patient Care Overview (Adult)  Goal: Plan of Care Review  Outcome: Ongoing (interventions implemented as appropriate)    03/19/17 1100   Coping/Psychosocial Response Interventions   Plan Of Care Reviewed With patient   Outcome Evaluation   Outcome Summary/Follow up Plan Pt with increased ambulation distance and increased IND with transfers/bed mobility.         Problem: Inpatient Physical Therapy  Goal: Bed Mobility Goal LTG- PT  Outcome: Ongoing (interventions implemented as appropriate)    03/18/17 1411 03/19/17 1100   Bed Mobility PT LTG   Bed Mobility PT LTG, Date Established 03/18/17 --    Bed Mobility PT LTG, Time to Achieve 5 days --    Bed Mobility PT LTG, Activity Type roll left/roll right;supine to sit/sit to supine --    Bed Mobility PT LTG, [place] Level conditional independence --    Bed Mobility PT Goal LTG, Assist Device bed rails --    Bed Mobility PT LTG, Date Goal Reviewed --  03/19/17   Bed Mobility PT LTG, Outcome --  goal ongoing       Goal: Transfer Training Goal 1 LTG- PT  Outcome: Ongoing (interventions implemented as appropriate)    03/18/17 1411 03/19/17 1100   Transfer Training PT LTG   Transfer Training PT LTG, Date Established 03/18/17 --    Transfer Training PT LTG, Time to Achieve 5 days --    Transfer Training PT LTG, Activity Type bed to chair /chair to bed;sit to stand/stand to sit --    Transfer Training PT LTG, [place] Level supervision required --    Transfer Training PT LTG, Assist Device walker, rolling --    Transfer Training PT LTG, Date Goal Reviewed --  03/19/17   Transfer Training PT LTG, Outcome --  goal ongoing       Goal: Gait Training Goal LTG- PT  Outcome: Ongoing (interventions implemented as appropriate)    03/18/17 1411 03/19/17 1100   Gait Training PT LTG   Gait Training Goal PT LTG, Date Established 03/18/17 --    Gait Training Goal PT LTG, Time to Achieve 5 days --    Gait Training Goal PT LTG, [place] Level supervision required --     Gait Training Goal PT LTG, Assist Device walker, rolling --    Gait Training Goal PT LTG, Distance to Achieve 250 --    Gait Training Goal PT LTG, Date Goal Reviewed --  03/19/17   Gait Training Goal PT LTG, Outcome --  goal ongoing       Goal: Patient Education Goal LTG- PT  Outcome: Ongoing (interventions implemented as appropriate)    03/18/17 1411 03/19/17 1100   Patient Education PT LTG   Patient Education PT LTG, Date Established 03/18/17 --    Patient Education PT LTG, Time to Achieve 5 days --    Patient Education PT LTG, Education Type precaution per surgeon;brace use/care;posture/body mechanics;gait;transfers;bed mobility;progression of POC;benefits of activity --    Patient Education PT LTG, Education Understanding demonstrate adequately;verbalize understanding --    Patient Education PT LTG, Date Goal Reviewed --  03/19/17   Patient Education PT LTG Outcome --  goal ongoing

## 2017-03-19 NOTE — PROGRESS NOTES
This is a 77-year-old woman who is postoperative day 2 status post an L3 4 PLIF with Dr. Skinner.  She reports that her pain is slightly worse today as compared to yesterday.  She is endorsing right-sided low back and hip pain.    Vitals:    17 0307   BP: 126/72   Pulse: 96   Resp: 16   Temp: 98.4 °F (36.9 °C)   SpO2: 96%       Temp (24hrs), Av.9 °F (37.2 °C), Min:97.6 °F (36.4 °C), Max:99.7 °F (37.6 °C)    Her surgical incision is clean, dry, and intact.  She has full motor strength in her legs both proximally and distally, however it is somewhat pain limited, particularly proximally.  Sensation is intact to light touch.    This is a 77-year-old woman who is postoperative day 2 status post an uncomplicated single level PLIF.  I made a modest increases to her oral pain control regimen.  My recommendation is for continued physical therapy and ambulation as well as pain control.  I anticipate that she should be cleared for transfer to a nursing home or rehabilitation facility in the next day or 2.

## 2017-03-20 PROCEDURE — 94799 UNLISTED PULMONARY SVC/PX: CPT

## 2017-03-20 PROCEDURE — 99024 POSTOP FOLLOW-UP VISIT: CPT | Performed by: PHYSICIAN ASSISTANT

## 2017-03-20 PROCEDURE — 99024 POSTOP FOLLOW-UP VISIT: CPT | Performed by: NEUROLOGICAL SURGERY

## 2017-03-20 PROCEDURE — 97530 THERAPEUTIC ACTIVITIES: CPT | Performed by: OCCUPATIONAL THERAPIST

## 2017-03-20 PROCEDURE — 97116 GAIT TRAINING THERAPY: CPT

## 2017-03-20 PROCEDURE — 97110 THERAPEUTIC EXERCISES: CPT

## 2017-03-20 RX ADMIN — GABAPENTIN 600 MG: 300 CAPSULE ORAL at 21:49

## 2017-03-20 RX ADMIN — CELECOXIB 200 MG: 200 CAPSULE ORAL at 09:30

## 2017-03-20 RX ADMIN — BACLOFEN 10 MG: 10 TABLET ORAL at 09:31

## 2017-03-20 RX ADMIN — GABAPENTIN 600 MG: 300 CAPSULE ORAL at 05:23

## 2017-03-20 RX ADMIN — OXYCODONE HYDROCHLORIDE AND ACETAMINOPHEN 1 TABLET: 10; 325 TABLET ORAL at 15:57

## 2017-03-20 RX ADMIN — HYDROCODONE BITARTRATE AND ACETAMINOPHEN 1 TABLET: 10; 325 TABLET ORAL at 05:10

## 2017-03-20 RX ADMIN — GABAPENTIN 600 MG: 300 CAPSULE ORAL at 14:19

## 2017-03-20 RX ADMIN — POTASSIUM CHLORIDE 10 MEQ: 750 CAPSULE, EXTENDED RELEASE ORAL at 21:49

## 2017-03-20 RX ADMIN — Medication 1 TABLET: at 09:31

## 2017-03-20 RX ADMIN — NIFEDIPINE 30 MG: 30 TABLET, FILM COATED, EXTENDED RELEASE ORAL at 05:22

## 2017-03-20 RX ADMIN — FLUTICASONE PROPIONATE 1 SPRAY: 50 SPRAY, METERED NASAL at 09:32

## 2017-03-20 RX ADMIN — HYDROCODONE BITARTRATE AND ACETAMINOPHEN 1 TABLET: 10; 325 TABLET ORAL at 22:10

## 2017-03-20 RX ADMIN — BISACODYL 10 MG: 10 SUPPOSITORY RECTAL at 09:32

## 2017-03-20 RX ADMIN — BACLOFEN 10 MG: 10 TABLET ORAL at 21:49

## 2017-03-20 RX ADMIN — CETIRIZINE HYDROCHLORIDE 10 MG: 10 TABLET, FILM COATED ORAL at 09:31

## 2017-03-20 RX ADMIN — AMITRIPTYLINE HYDROCHLORIDE 20 MG: 10 TABLET, FILM COATED ORAL at 23:25

## 2017-03-20 RX ADMIN — ASPIRIN 81 MG CHEWABLE TABLET 81 MG: 81 TABLET CHEWABLE at 09:32

## 2017-03-20 RX ADMIN — Medication 1 TABLET: at 09:32

## 2017-03-20 RX ADMIN — POTASSIUM CHLORIDE 10 MEQ: 750 CAPSULE, EXTENDED RELEASE ORAL at 15:57

## 2017-03-20 RX ADMIN — NIFEDIPINE 30 MG: 30 TABLET, FILM COATED, EXTENDED RELEASE ORAL at 21:50

## 2017-03-20 RX ADMIN — BACLOFEN 10 MG: 10 TABLET ORAL at 14:19

## 2017-03-20 RX ADMIN — PANTOPRAZOLE SODIUM 40 MG: 40 TABLET, DELAYED RELEASE ORAL at 05:22

## 2017-03-20 RX ADMIN — POTASSIUM CHLORIDE 10 MEQ: 750 CAPSULE, EXTENDED RELEASE ORAL at 09:30

## 2017-03-20 RX ADMIN — HYDROCHLOROTHIAZIDE 12.5 MG: 12.5 TABLET ORAL at 09:30

## 2017-03-20 RX ADMIN — CELECOXIB 200 MG: 200 CAPSULE ORAL at 17:43

## 2017-03-20 RX ADMIN — AMITRIPTYLINE HYDROCHLORIDE 20 MG: 10 TABLET, FILM COATED ORAL at 00:26

## 2017-03-20 RX ADMIN — HYDROCODONE BITARTRATE AND ACETAMINOPHEN 1 TABLET: 10; 325 TABLET ORAL at 11:08

## 2017-03-20 NOTE — DISCHARGE PLACEMENT REQUEST
"Lisa Burkett (77 y.o. Female)     Sonja Kellerman RN,  [phone]      Date of Birth Social Security Number Address Home Phone MRN    1940  [address] [place] [phone] [id number]    Rastafari Marital Status          Mandaen        Admission Date Admission Type Admitting Provider Attending Provider Department, Room/Bed    3/17/17 Elective Sen Skinner MD Bean, James R, MD [facility] 5E, S531/1    Discharge Date Discharge Disposition Discharge Destination                      Attending Provider: Sen Skinner MD     Allergies:  Amlodipine, Beta Adrenergic Blockers, Ergotamine-caffeine, Lisinopril, Metoprolol, Other, Tenormin [Atenolol], Trovan [Trovafloxacin], Atorvastatin, Cefprozil, Codeine, Contrast Dye, Coreg [Carvedilol], Fish-derived Products, Penicillins, Sectral [Acebutolol Hcl], Sulfa Antibiotics, Tetracycline    Isolation:  None   Infection:  MRSA (03/16/17)   Code Status:  FULL    Ht:  60\" (152.4 cm)   Wt:  129 lb (58.5 kg)    Admission Cmt:  None   Principal Problem:  None                Active Insurance as of 3/17/2017     Primary Coverage     Payor Plan Insurance Group Employer/Plan Group    [employer] MEDICARE REPLACEMENT [employer] [id number]     Payor Plan Address Payor Plan Phone Number Effective From Effective To    [address] [address]  1/1/2016     [place] [address]       Subscriber Name Subscriber Birth Date Member ID       LISA BURKETT 1940 [id number]                 Emergency Contacts      (Rel.) Home Phone Work Phone Mobile Phone    Renita Ariza (Friend) [phone] -- --    Sg Dooley! (Relative) -- -- [phone]            Insurance Information                [employer] MED[employer] REPLACEMENT/[employer] Phone:     Subscriber: Lisa Burkett Subscriber#: [id number]    Group#: [id number] Precert#:              History & Physical      Eugenia Mccann PA-C at 3/16/2017  " 9:51 AM          HISTORY AND PHYSICAL for SURGERY    Referring Provider: Tobin Acosta MD    Chief complaint: Back and leg pain    Subjective .   History of present illness:    The patient is a 77-year-old woman who has a history of progressive back and leg pain over the past year that has become extremely severe in the past 7-8 weeks, to the point that now she is taking hydrocodone 7.5 at least 3 times a day. She had an L4 5 PLIF in January 2013, 4 years ago for spondylolisthesis with stenosis. I last saw her in August 2015 with symptoms of increasing back pain and findings on MRI scan of early facet hypertrophy at L3 4 and a 2 mm spondylolisthesis at L3 4. She has tried physical therapy in the past and actually felt worse with the attempts then seeing any improvement. She has had a left hip replacement in 2014 and had a staph infection afterwards. She is now nearly incapacitated by the level of pain she's having although she's functional when she is at a dose of hydrocodone.       History  Past Medical History   Diagnosis Date   • Calcinosis-Raynaud's sclerodactyly-telangiectasia syndrome    • Diastolic heart failure 12/9/2016     Echocardiogram, 08/10/2011: LVH, LVEF 70% and no significant valvular abnormality.  Pharmacologic MPS, 07/28/2011: normal, LVEF 80%. Recurrent chest pain/CHF presentations to the [place], [place] area. Cardiac catheterization, 05/23/2012: Normal coronary arteries, LVEF 65%, LVEDP 32 mmHg. Renal angiography, 05/23/2012: normal bilateral renal arteries.  Echocardiogram for worsening dyspnea, 03/ • History of stomach ulcers    • Lumbar herniated disc      with right-sided sciatica:Status post lumbar fusion, 01/17/2013.      Past Surgical History   Procedure Laterality Date   • Other surgical history       arthrodesis lumbar   • Cardiac surgery     • Hip surgery     • Other surgical history  1982     neuroplasty decompression median nerve at carpal tunnel   • Tonsillectomy  1963   • Tubal  abdominal ligation  1975   • Appendectomy  1975   • Cholecystectomy  1996   • Hysterectomy  1999    family history includes Cancer in her other; Congenital heart disease in her mother; Gout in her other; Hypertension in her mother; Stroke in her mother.   Social History     Social History   • Marital status:      Spouse name: N/A   • Number of children: N/A   • Years of education: N/A     Social History Main Topics   • Smoking status: Never Smoker   • Smokeless tobacco: Not on file   • Alcohol use No   • Drug use: No   • Sexual activity: Defer     Other Topics Concern   • Not on file     Social History Narrative       Allergies:  Amlodipine; Atorvastatin; Beta adrenergic blockers; Codeine; Coreg [carvedilol]; Ergotamine-caffeine; Lisinopril; Metoprolol; Other; Tenormin [atenolol]; Trovan [trovafloxacin]; Cefprozil; Contrast dye; Fish-derived products; Penicillins; Sectral [acebutolol hcl]; Sulfa antibiotics; and Tetracycline    Objective   Review of Systems  Constitutional: Positive for activity change, appetite change and diaphoresis. Negative for chills, fatigue, fever and unexpected weight change.   HENT: Positive for sinus pressure. Negative for congestion, dental problem, drooling, ear discharge, ear pain, facial swelling, hearing loss, mouth sores, nosebleeds, postnasal drip, rhinorrhea, sneezing, sore throat, tinnitus, trouble swallowing and voice change.   Eyes: Negative for photophobia, pain, discharge, redness, itching and visual disturbance.   Respiratory: Positive for shortness of breath. Negative for apnea, cough, choking, chest tightness, wheezing and stridor.   Cardiovascular: Positive for leg swelling. Negative for chest pain and palpitations.   Gastrointestinal: Negative for abdominal distention, abdominal pain, anal bleeding, blood in stool, constipation, diarrhea, nausea, rectal pain and vomiting.   Endocrine: Negative for cold intolerance, heat intolerance, polydipsia, polyphagia and  polyuria.   Genitourinary: Negative for decreased urine volume, difficulty urinating, dysuria, enuresis, flank pain, frequency, genital sores, hematuria and urgency.   Musculoskeletal: Positive for back pain. Negative for arthralgias, gait problem, joint swelling, myalgias, neck pain and neck stiffness.   Skin: Negative for color change, pallor, rash and wound.   Allergic/Immunologic: Negative for environmental allergies, food allergies and immunocompromised state.   Neurological: Positive for weakness and light-headedness. Negative for dizziness, tremors, seizures, syncope, facial asymmetry, speech difficulty, numbness and headaches.   Hematological: Negative for adenopathy. Does not bruise/bleed easily.   Psychiatric/Behavioral: Positive for sleep disturbance. Negative for agitation, behavioral problems, confusion, decreased concentration, dysphoric mood, hallucinations, self-injury and suicidal ideas. The patient is hyperactive. The patient is not nervous/anxious.     Physical Exam:  NEUROLOGICAL EXAMINATION:      MENTAL STATUS: Alert and oriented. Speech intact. Recent and remote memory intact.       CRANIAL NERVES:  Cranial nerve II: Visual fields are full to confrontation.  Cranial nerves III, IV and VI: PERRLADC. Extraocular movements are intact. Nystagmus is not present.  Cranial nerve V: Facial sensation is intact to light touch.  Cranial nerve VII: Muscles of facial expression reveal no asymmetry.  Cranial nerve VIII: Hearing is intact to finger rub bilaterally.  Cranial nerves IX and X: Palate elevates symmetrically.  Cranial nerve XI: Shoulder shrug is intact.  Cranial nerve XII: Tongue is midline without evidence of atrophy or fasciculation.     MUSCULOSKELETAL: SLR negative. Vishal's test negative.     MOTOR: Deltoid, biceps, triceps, and  strength intact. No hand atrophy. Hip flexion, knee extension, ankle dorsiflexion and plantar flexion intact. No tremor, spasticity, ataxia, or  "dysmetria.     SENSATION: Intact to touch upper and lower extremities. Position sense intact.     REFLEXES: DTR trace and equal in both knees and ankles.      Results Review:  Lumbar MRI scan on 1/27/17 shows severe spinal stenosis now at L3 4 with a grade 1 spondylolisthesis. The remaining spinal levels are unremarkable. There is a PLIF at L4 5 that looks fine and a minor disc bulge at L5-S1.    Assessment/Plan   Assessment     Severely symptomatic stenosis with spondylolisthesis L3 4, prior PLIF L4 5 for years previously.        Plan     PLIF with pedicle screw fixation and decompression L3-4.      Eugenia Mccann PA-C  On behalf of Sen Skinner MD  03/16/17  9:52 AM     Electronically signed by Eugenia Mccann PA-C at 3/16/2017  9:53 AM      Sen Skinner MD at 3/17/2017 11:18 AM          Pre-Op H&P (See Recent Office Note Attached)    Chief complaint: Low back pain into LLE    Review of Systems:  General ROS:  no fever, chills, rashes, No change since last office visit  Cardiovascular ROS: no chest pain or dyspnea on exertion.  +cardiac clearance  Respiratory ROS: no cough, + baseline shortness of breath, or wheezing    Immunization History:   Influenza:  Yes 2016  Pneumococcal:  Yes < 5 years  Tetanus:  unknown    Vital Signs:  Visit Vitals   • /82 (BP Location: Right arm, Patient Position: Lying)   • Pulse 94   • Temp 98.6 °F (37 °C) (Temporal Artery )   • Resp 20   • Ht 60\" (152.4 cm)   • Wt 129 lb (58.5 kg)   • SpO2 96%   • BMI 25.19 kg/m2       Physical Exam:    CV:  S1S2 regular rate and rhythm, no murmur               Resp:  Clear to auscultation; respirations regular, even and unlabored    Results Review:    I reviewed the patient's new clinical results.    Nevaeh Chairez, TITA  3/17/2017 11:19 AM       Electronically signed by Sen Skinner MD at 3/17/2017 11:28 AM        Hospital Medications (active)       Dose Frequency Start End    acetaminophen (TYLENOL) tablet 650 mg 650 mg Every " 4 Hours PRN 3/17/2017     Sig - Route: Take 2 tablets by mouth Every 4 (Four) Hours As Needed for Mild Pain (1-3). - Oral    albuterol (PROVENTIL) nebulizer solution 0.5% 2.5 mg/0.5mL 2.5 mg Every 6 Hours PRN 3/17/2017     Sig - Route: Take 0.5 mL by nebulization Every 6 (Six) Hours As Needed for Wheezing. - Nebulization    amitriptyline (ELAVIL) tablet 20 mg 20 mg Nightly PRN 3/17/2017     Sig - Route: Take 2 tablets by mouth At Night As Needed (anxiety). - Oral    aspirin chewable tablet 81 mg 81 mg Daily 3/18/2017     Sig - Route: Chew 1 tablet Daily. - Oral    baclofen (LIORESAL) tablet 10 mg 10 mg 3 Times Daily 3/17/2017     Sig - Route: Take 1 tablet by mouth 3 (Three) Times a Day. - Oral    bisacodyl (DULCOLAX) suppository 10 mg 10 mg Daily 3/19/2017     Sig - Route: Insert 1 suppository into the rectum Daily. - Rectal    busPIRone (BUSPAR) tablet 10 mg 10 mg 4 Times Daily PRN 3/17/2017     Sig - Route: Take 1 tablet by mouth 4 (Four) Times a Day As Needed (anxiety). - Oral    calcium citrate-vitamin d (CITRACAL) 200-250 MG-UNIT tablet 1 tablet 1 tablet Daily 3/17/2017     Sig - Route: Take 1 tablet by mouth Daily. - Oral    celecoxib (CeleBREX) capsule 200 mg 200 mg 2 Times Daily 3/18/2017     Sig - Route: Take 1 capsule by mouth 2 (Two) Times a Day. - Oral    cetirizine (zyrTEC) tablet 10 mg 10 mg Daily 3/17/2017     Sig - Route: Take 1 tablet by mouth Daily. - Oral    fluticasone (FLONASE) 50 MCG/ACT nasal spray 1 spray 1 spray Daily 3/18/2017     Sig - Route: 1 spray into each nostril Daily. - Nasal    furosemide (LASIX) tablet 20 mg 20 mg Daily 3/18/2017     Sig - Route: Take 1 tablet by mouth Daily. - Oral    gabapentin (NEURONTIN) capsule 600 mg 600 mg Every 8 Hours Scheduled 3/17/2017     Sig - Route: Take 2 capsules by mouth Every 8 (Eight) Hours. - Oral    hydrochlorothiazide (HYDRODIURIL) tablet 12.5 mg 12.5 mg Daily 3/18/2017     Sig - Route: Take 1 tablet by mouth Daily. - Oral     HYDROcodone-acetaminophen (NORCO)  MG per tablet 1 tablet 1 tablet Every 6 Hours PRN 3/19/2017 3/29/2017    Sig - Route: Take 1 tablet by mouth Every 6 (Six) Hours As Needed for Moderate Pain (4-6). - Oral    HYDROmorphone (DILAUDID) injection 0.5 mg 0.5 mg Every 2 Hours PRN 3/18/2017 3/28/2017    Sig - Route: Infuse 0.5 mg into a venous catheter Every 2 (Two) Hours As Needed for Severe Pain (7-10). - Intravenous    magnesium hydroxide (MILK OF MAGNESIA) suspension 2400 mg/10mL 10 mL 10 mL Daily PRN 3/17/2017     Sig - Route: Take 10 mL by mouth Daily As Needed for Constipation. - Oral    multivitamin (THERAGRAN) tablet 1 tablet 1 tablet Daily 3/18/2017     Sig - Route: Take 1 tablet by mouth Daily. - Oral    multivitamin-minerals (OCUVITE) tablet 1 tablet 1 tablet Daily 3/17/2017     Sig - Route: Take 1 tablet by mouth Daily. - Oral    naloxone (NARCAN) injection 0.1 mg 0.1 mg Every 5 Minutes PRN 3/17/2017     Sig - Route: Infuse 0.25 mL into a venous catheter Every 5 (Five) Minutes As Needed for Opioid Reversal or Respiratory Depression (see administration instructions). - Intravenous    NIFEdipine XL (PROCARDIA XL) 24 hr tablet 30 mg 30 mg Every 8 Hours Scheduled 3/17/2017     Sig - Route: Take 1 tablet by mouth Every 8 (Eight) Hours. - Oral    ondansetron (ZOFRAN) injection 4 mg 4 mg Every 6 Hours PRN 3/17/2017     Sig - Route: Infuse 2 mL into a venous catheter Every 6 (Six) Hours As Needed for Nausea or Vomiting. - Intravenous    ondansetron (ZOFRAN) tablet 4 mg 4 mg Every 6 Hours PRN 3/17/2017     Sig - Route: Take 1 tablet by mouth Every 6 (Six) Hours As Needed for Nausea or Vomiting. - Oral    oxyCODONE-acetaminophen (PERCOCET)  MG per tablet 1 tablet 1 tablet Every 4 Hours PRN 3/19/2017 3/29/2017    Sig - Route: Take 1 tablet by mouth Every 4 (Four) Hours As Needed for Moderate Pain (4-6). - Oral    pantoprazole (PROTONIX) EC tablet 40 mg 40 mg Every Early Morning 3/18/2017     Sig - Route:  Take 1 tablet by mouth Every Morning. - Oral    potassium chloride (MICRO-K) CR capsule 10 mEq 10 mEq 3 Times Daily 3/17/2017     Sig - Route: Take 1 capsule by mouth 3 (Three) Times a Day. - Oral    promethazine (PHENERGAN) tablet 12.5 mg 12.5 mg Every 6 Hours PRN 3/17/2017     Sig - Route: Take 1 tablet by mouth Every 6 (Six) Hours As Needed for Nausea or Vomiting. - Oral    sennosides-docusate sodium (SENOKOT-S) 8.6-50 MG tablet 1 tablet 1 tablet As Needed 3/17/2017     Sig - Route: Take 1 tablet by mouth As Needed for Constipation. - Oral    temazepam (RESTORIL) capsule 15 mg 15 mg Nightly PRN 3/17/2017 3/27/2017    Sig - Route: Take 1 capsule by mouth At Night As Needed for Sleep. - Oral          Consult Notes (last 72 hours) (Notes from 3/17/2017  8:47 AM through 3/20/2017  8:47 AM)     No notes of this type exist for this encounter.

## 2017-03-20 NOTE — PLAN OF CARE
Problem: Patient Care Overview (Adult)  Goal: Plan of Care Review  Outcome: Ongoing (interventions implemented as appropriate)    03/20/17 1638   Coping/Psychosocial Response Interventions   Plan Of Care Reviewed With patient   Patient Care Overview   Progress improving   Outcome Evaluation   Outcome Summary/Follow up Plan Patient ambulating with physical therapy, pain managed well with PRNs, plan to discharge to rehab tomorrow in [place]         Problem: Perioperative Period (Adult)  Goal: Signs and Symptoms of Listed Potential Problems Will be Absent or Manageable (Perioperative Period)  Outcome: Ongoing (interventions implemented as appropriate)    03/17/17 1103   Perioperative Period   Problems Assessed (Perioperative Period) pain;infection   Problems Present (Perioperative Period) pain

## 2017-03-20 NOTE — PROGRESS NOTES
"NEUROSURGERY PROGRESS NOTE    Vital Signs  Blood pressure 134/62, pulse 96, temperature 97.8 °F (36.6 °C), temperature source Tympanic, resp. rate 18, height 60\" (152.4 cm), weight 129 lb (58.5 kg), SpO2 93 %.    Interval History:   POD #3: L3-4 PLIF with pedicle screws.    Ambulatory today.  Prefers Lorcet to Percocet, and we'll change prescription in the hospital and at discharge.    Expect she will be ready for discharge tomorrow with ambulance transfer.      ASSESSMENT: POD #3: L3-4 PLIF with pedicle screws.    PLAN: Discharged tomorrow if transfer arranged.  Switch to Lortab 10 for pain.    Sen Skinner MD  03/20/17  5:29 PM    "

## 2017-03-20 NOTE — PROGRESS NOTES
"NEUROSURGERY PROGRESS NOTE    Interval History:   POD #3:  S/p: lumbar laminectomy L3-4 with PLIF/pedicle screw fixation.  Patient is ambulating with assistance of physical therapy.  Her pain in under adequate control and she is voiding and tolerating oral intake.  Her incision site is dry and without erythema.  Motor and sensory intact.    Vital Signs  Blood pressure 137/65, pulse 96, temperature 97.4 °F (36.3 °C), temperature source Tympanic, resp. rate 18, height 60\" (152.4 cm), weight 129 lb (58.5 kg), SpO2 98 %.    Physical Exam:  Physical Exam   Constitutional: She is oriented to person, place, and time and well-developed, well-nourished, and in no distress.   Neck: Normal range of motion. Neck supple.   Pulmonary/Chest: Effort normal and breath sounds normal.   Abdominal: She exhibits distension. There is no tenderness.   Musculoskeletal:        Right shoulder: She exhibits decreased range of motion and tenderness.   Neurological: She is alert and oriented to person, place, and time. She has intact cranial nerves.   Skin: Skin is warm, dry and intact.   Psychiatric: Mood, memory, affect and judgment normal.        Results Review:    Lab Results (last 24 hours)     ** No results found for the last 24 hours. **          ASSESSMENT: POD #3 status post  lumbar laminectomy L3-4 with PLIF/pedicle screw fixation.    PLAN: continue pain management and ambulation with physical therapy.  Case management working on placement at [facility] nursing [facility] rehabilitation skilled nursing facility, bed available and awaiting insurance authorization.  Likely discharge tomorrow, 03/21/17, with ambulance transfer to the SNF for additional acute physical rehabilitation.    She requests a 4 week follow up appointment with Dr. Skinner in [place], [place], as she cannot drive and it is difficult to arrange transportation to [place].      Eugenia Mccann PA-C  03/20/17  9:24 AM  "

## 2017-03-20 NOTE — PROGRESS NOTES
Discharge Planning Assessment  [facility]     Patient Name: Lisa Calloway  MRN: [id number]  Today's Date: 3/20/2017    Admit Date: 3/17/2017          Discharge Needs Assessment     None            Discharge Plan       03/20/17 0853    Case Management/Social Work Plan    Plan SNF    Patient/Family In Agreement With Plan yes    Additional Comments I met with Ms Calloway to discuss her d/c plan/goal. She plans on going to a SNF prior to returning home. She lives alone in [place]. Prior to admit she was independent with all ADL's still driving, etc. She stated her activity was very limited by pain. She has requested a referral to [facility] nursing & rehab SNF in [place], [place]. I have spoken to Angela there, she has confirmed they will have a skilled bed available.Her insurance will require authorization. We will plan on transfer tomorrow 3/21 PENDING insurance authorization. Case mgt will set up ambulance transport        Discharge Placement     Facility/Agency Request Status Selected? Address Phone Number Fax Number    [facility] Pending - Request Sent     [id number]  [address] [facility] [address] [phone]                Demographic Summary       03/20/17 0848    Referral Information    Admission Type inpatient    Arrived From admitted as an inpatient;home or self-care    Referral Source physician    Reason For Consult discharge planning    Record Reviewed history and physical;medical record    Contact Information    Permission Granted to Share Information With     Primary Care Physician Information    Name Tobin Acosta            Functional Status       03/20/17 0849    Functional Status Prior    Ambulation 1-->assistive equipment   limited by pain    Transferring 0-->independent    Toileting 0-->independent    Bathing 0-->independent    Dressing 0-->independent    Eating 0-->independent    Communication 0-->understands/communicates without difficulty    Swallowing  0-->swallows foods/liquids without difficulty    IADL    Medications independent    Meal Preparation independent    Housekeeping independent    Laundry independent    Shopping independent    Oral Care independent    Activity Tolerance    Usual Activity Tolerance poor    Cognitive/Perceptual/Developmental    Current Mental Status/Cognitive Functioning no deficits noted    Recent Changes in Mental Status/Cognitive Functioning no changes    Employment/Financial    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

## 2017-03-20 NOTE — PLAN OF CARE
Problem: Patient Care Overview (Adult)  Goal: Plan of Care Review  Outcome: Ongoing (interventions implemented as appropriate)    03/20/17 1418   Coping/Psychosocial Response Interventions   Plan Of Care Reviewed With patient   Patient Care Overview   Progress improving   Outcome Evaluation   Outcome Summary/Follow up Plan Pt increased gait distance to 150 feet with CGA and demo good participation with ther ex. Pt still limited by pain and requires cues for sequencing. Will continue to progress mobility as able.          Problem: Inpatient Physical Therapy  Goal: Bed Mobility Goal LTG- PT  Outcome: Ongoing (interventions implemented as appropriate)    03/18/17 1411 03/20/17 1418   Bed Mobility PT LTG   Bed Mobility PT LTG, Date Established 03/18/17 --    Bed Mobility PT LTG, Time to Achieve 5 days --    Bed Mobility PT LTG, Activity Type roll left/roll right;supine to sit/sit to supine --    Bed Mobility PT LTG, [place] Level conditional independence --    Bed Mobility PT Goal LTG, Assist Device bed rails --    Bed Mobility PT LTG, Date Goal Reviewed --  03/20/17   Bed Mobility PT LTG, Outcome --  goal ongoing       Goal: Transfer Training Goal 1 LTG- PT  Outcome: Ongoing (interventions implemented as appropriate)    03/18/17 1411 03/20/17 1418   Transfer Training PT LTG   Transfer Training PT LTG, Date Established 03/18/17 --    Transfer Training PT LTG, Time to Achieve 5 days --    Transfer Training PT LTG, Activity Type bed to chair /chair to bed;sit to stand/stand to sit --    Transfer Training PT LTG, [place] Level supervision required --    Transfer Training PT LTG, Assist Device walker, rolling --    Transfer Training PT LTG, Date Goal Reviewed --  03/20/17   Transfer Training PT LTG, Outcome --  goal ongoing       Goal: Gait Training Goal LTG- PT  Outcome: Ongoing (interventions implemented as appropriate)    03/18/17 1411 03/20/17 1418   Gait Training PT LTG   Gait Training Goal PT LTG, Date  Established 03/18/17 --    Gait Training Goal PT LTG, Time to Achieve 5 days --    Gait Training Goal PT LTG, [place] Level supervision required --    Gait Training Goal PT LTG, Assist Device walker, rolling --    Gait Training Goal PT LTG, Distance to Achieve 250 --    Gait Training Goal PT LTG, Date Goal Reviewed --  03/20/17   Gait Training Goal PT LTG, Outcome --  goal ongoing       Goal: Patient Education Goal LTG- PT  Outcome: Ongoing (interventions implemented as appropriate)    03/18/17 1411 03/20/17 1418   Patient Education PT LTG   Patient Education PT LTG, Date Established 03/18/17 --    Patient Education PT LTG, Time to Achieve 5 days --    Patient Education PT LTG, Education Type precaution per surgeon;brace use/care;posture/body mechanics;gait;transfers;bed mobility;progression of POC;benefits of activity --    Patient Education PT LTG, Education Understanding demonstrate adequately;verbalize understanding --    Patient Education PT LTG, Date Goal Reviewed --  03/20/17   Patient Education PT LTG Outcome --  goal ongoing

## 2017-03-20 NOTE — PLAN OF CARE
Problem: Patient Care Overview (Adult)  Goal: Plan of Care Review  Outcome: Ongoing (interventions implemented as appropriate)    03/20/17 1303   Coping/Psychosocial Response Interventions   Plan Of Care Reviewed With patient   Outcome Evaluation   Outcome Summary/Follow up Plan pt met bed mobility goal and progressing toward all other OT goals. pt recalls appropriately all spinal precautions and exhibits safety with ADL tasks.          Problem: Inpatient Occupational Therapy  Goal: Bed Mobility Goal LTG- OT  Outcome: Outcome(s) achieved Date Met:  03/20/17 03/18/17 1519 03/20/17 1303   Bed Mobility OT LTG   Bed Mobility OT LTG, Date Established 03/18/17 --    Bed Mobility OT LTG, Time to Achieve by discharge --    Bed Mobility OT LTG, Activity Type supine to sit/sit to supine --    Bed Mobility OT LTG, [place] Level supervision required --    Bed Mobility OT LTG, Assist Device bed rails --    Bed Mobility OT LTG, Outcome --  goal met       Goal: Bathing Goal LTG- OT  Outcome: Ongoing (interventions implemented as appropriate)    03/18/17 1519 03/20/17 1303   Bathing OT LTG   Bathing Goal OT LTG, Date Established 03/18/17 --    Bathing Goal OT LTG, Time to Achieve by discharge --    Bathing Goal OT LTG, Activity Type simulates;lower body bathing --    Bathing Goal OT LTG, Assist Device sponge, long handled --    Bathing Goal OT LTG, Outcome --  goal ongoing       Goal: LB Dressing Goal LTG- OT  Outcome: Ongoing (interventions implemented as appropriate)    03/18/17 1519 03/20/17 1303   LB Dressing OT LTG   LB Dressing Goal OT LTG, Date Established 03/18/17 --    LB Dressing Goal OT LTG, Time to Achieve by discharge --    LB Dressing Goal OT LTG, [place] Level minimum assist (75% patient effort) --    LB Dressing Goal OT LTG, Adaptive Equipment reacher;shoe horn, long handled;sock-aid --    LB Dressing Goal OT LTG, Outcome --  goal ongoing       Goal: Functional Mobility Goal LTG- OT  Outcome: Ongoing  (interventions implemented as appropriate)    03/18/17 1519 03/20/17 1303   Functional Mobility OT LTG   Functional Mobility Goal OT LTG, Date Established 03/18/17 --    Functional Mobility Goal OT LTG, Time to Achieve by discharge --    Functional Mobility Goal OT LTG, [place] Level supervision --    Functional Mobility Goal OT LTG, Assist Device rolling walker --    Functional Mobility Goal OT LTG, Distance to Achieve in hallway;to the bathroom --    Functional Mobility Goal OT LTG, Outcome --  goal ongoing

## 2017-03-20 NOTE — PROGRESS NOTES
"Acute Care - Physical Therapy Treatment Note  [facility]     Patient Name: Lisa Calloway  : 1940  MRN: [id number]  Today's Date: 3/20/2017  Onset of Illness/Injury or Date of Surgery Date: 17  Date of Referral to PT: 17  Referring Physician: Brody    Admit Date: 3/17/2017    Visit Dx:    ICD-10-CM ICD-9-CM   1. Impaired functional mobility, balance, gait, and endurance Z74.09 V49.89   2. Acquired spondylolisthesis M43.10 738.4   3. Impaired mobility and ADLs Z74.09 799.89     Patient Active Problem List   Diagnosis   • Abnormal mammogram   • Essential hypertension   • Osteoarthritis (arthritis due to wear and tear of joints)   • Raynaud disease   • Anxiety   • COPD (chronic obstructive pulmonary disease)   • GERD (gastroesophageal reflux disease)   • Hip pain   • Chronic diastolic heart failure   • Palpitations   • Polypharmacy   • Spondylolisthesis, lumbar region   • Spinal stenosis, lumbar region, with neurogenic claudication   • Spondylolisthesis, acquired   • Acquired spondylolisthesis               Adult Rehabilitation Note       17 1342 17 1304 17 1025    Rehab Assessment/Intervention    Discipline physical therapist  -MJ occupational therapist  -ST physical therapist  -JM    Document Type therapy note (daily note)  -MJ therapy note (daily note)  -ST therapy note (daily note)  -JM    Subjective Information agree to therapy;complains of;pain  -MJ agree to therapy;complains of   \"I'm so uncomfortable how I am right now\"  -ST agree to therapy;complains of;pain  -JM    Patient Effort, Rehab Treatment good  -MJ good  -ST good  -JM    Symptoms Noted During/After Treatment  other (see comments)   improved comfort  -ST dizziness;increased pain  -JM    Precautions/Limitations brace on when up;fall precautions;spinal precautions   LSO on when up  -MJ brace on when up;spinal precautions   LSO when OOB  -ST spinal precautions;fall precautions;brace on when up   LSO  -JM    Recorded " by [MJ] Micheal Cox, PT [ST] Tawana Rangel, OTR [JM] Byron Paniagua, PT    Vital Signs    Pre Systolic BP Rehab   141  -JM    Pre Treatment Diastolic BP   67  -JM    Post Systolic BP Rehab   123  -JM    Post Treatment Diastolic BP   49  -JM    Pretreatment Heart Rate (beats/min)   105  -JM    Posttreatment Heart Rate (beats/min)   105  -JM    Recorded by   [JM] Byron Paniagua, PT    Pain Assessment    Pain Assessment 0-10  -MJ 0-10  -ST 0-10  -JM    Pain Score 5  -MJ 7  -ST 6  -JM    Post Pain Score 7  -MJ 4  -ST 7  -JM    Pain Type Acute pain  -MJ Surgical pain  -ST Surgical pain  -JM    Pain Location Back  -MJ Back   radiating into R hip  -ST Back  -JM    Pain Orientation   Lower  -JM    Pain Intervention(s) Repositioned;Ambulation/increased activity  -MJ Repositioned;Ambulation/increased activity  -ST Medication (See MAR);Repositioned  -JM    Response to Interventions  improved  -ST Tolerated  -JM    Recorded by [MJ] Micheal Cox, PT [ST] Tawana Rangel, OTR [JM] Byron Paniagua, PT    Cognitive Assessment/Intervention    Current Cognitive/Communication Assessment functional  -MJ functional  -ST functional  -JM    Orientation Status oriented x 4  -MJ oriented x 4  -ST oriented x 4  -JM    Follows Commands/Answers Questions 100% of the time;able to follow multi-step instructions;needs cueing  -% of the time  -% of the time  -    Personal Safety WNL/WFL  -MJ WNL/WFL  -ST WNL/WFL  -    Personal Safety Interventions  fall prevention program maintained;gait belt;nonskid shoes/slippers when out of bed  -ST     Recorded by [MJ] Micheal Cox, PT [ST] Tawana Rangel, OTR [JM] Byron Paniagua, PT    Bed Mobility, Assessment/Treatment    Bed Mobility, Assistive Device bed rails;head of bed elevated  -MJ bed rails;head of bed elevated  -ST bed rails  -    Bed Mobility, Roll Right, [place]   supervision required;verbal cues required  -    Bed Mob, Supine to Sit, [place] supervision required;verbal  cues required  - supervision required  -ST supervision required;verbal cues required  -    Bed Mob, Sit to Supine, [place] supervision required;verbal cues required  -MJ supervision required  -ST     Bed Mobility, Impairments strength decreased;pain   spinal precautions  -      Bed Mobility, Comment Pt performed log roll into/out of bed. Verbal cues for sequencing, increased time and effort to perform  - pt exhibits good technique w/log roll to R side and L side   -ST     Recorded by [MJ] Micheal Cox, PT [ST] Tawana Rangel, OTR [JM] Byron Paniagua, PT    Transfer Assessment/Treatment    Transfers, Sit-Stand [place] stand by assist;verbal cues required  - supervision required  -ST supervision required  -    Transfers, Stand-Sit [place] stand by assist;verbal cues required  - supervision required  -ST supervision required  -    Transfers, Sit-Stand-Sit, Assist Device rolling walker  -MJ rolling walker  -ST rolling walker  -    Toilet Transfer, [place] minimum assist (75% patient effort);verbal cues required  - contact guard assist  -ST contact guard assist  -    Toilet Transfer, Assistive Device bedside commode without drop arms;straight cane  - elevated toilet seat  -ST --   grab bar  -    Transfer, Safety Issues step length decreased;weight-shifting ability decreased  -      Transfer, Impairments strength decreased;pain  -      Transfer, Comment Verbal cues for correct hand placement. Assisted pt to bathroom, more assist required for balance and pt's RW does not fit into bathroom. Pt required assist for hygiene after toileting  -      Recorded by [MJ] Micheal Cox, PT [ST] Tawana Rangel, OTR [JM] Byron Paniagua, PT    Gait Assessment/Treatment    Gait, [place] Level contact guard assist;verbal cues required  -  contact guard assist;verbal cues required  -    Gait, Assistive Device rolling walker  -MJ  rolling walker  -    Gait, Distance (Feet) 150   -  --   75x2; 1 sitting rest break x2 min  -    Gait, Gait Pattern Analysis swing-through gait  -  swing-through gait  -    Gait, Gait Deviations bilateral:;esther decreased;step length decreased  -  step length decreased  -    Gait, Safety Issues step length decreased  -      Gait, Impairments strength decreased;pain  -      Gait, Comment Pt demo step through gait pattern at slow pace. Verbal cues to stay in middle of RW and for upright posture. Gait limited by pain and fatigue  -MJ      Recorded by [MJ] Micheal Cox, PT  [JM] Byron Paniagua, PT    Functional Mobility    Functional Mobility- Ind. Level  contact guard assist  -ST     Functional Mobility- Device  rolling walker  -ST     Functional Mobility-Distance (Feet)  50  -ST     Functional Mobility- Comment  into bathroom, to window, sink and back to bed   -ST     Recorded by  [ST] Tawana Rangel, MEAGHANR     Toileting Assessment/Training    Toileting Assess/Train, Assistive Device  raised toilet seat  -ST     Toileting Assess/Train, Position  standing  -ST     Toileting Assess/Train, Indepen Level  dependent (less than 25% patient effort)  -ST     Toileting Assess/Train, Comment  pt dependent for toileting hygiene d/t brace and spinal precautions  -ST     Recorded by  [ST] Tawana Rangel, MEAGHANR     Grooming Assessment/Training    Grooming Assess/Train, Comment  SUA for hand hygiene SS   -ST     Recorded by  [ST] Tawana Rangel, OTR     Motor Skills/Interventions    Additional Documentation  Balance Skills Training (Group)  -ST     Recorded by  [ST] Tawana Rangel, MEAGHANR     Balance Skills Training    Sitting-Level of Assistance  Independent  -ST     Standing-Level of Assistance  Close supervision  -ST     Static Standing Balance Support  assistive device  -ST     Gait Balance-Level of Assistance  Contact guard  -ST     Gait Balance Support  assistive device  -ST     Recorded by  [ST] Tawana Rangel, OTR     Therapy Exercises    Bilateral Lower  Extremities AROM:;10 reps;supine;ankle pumps/circles;glut sets;quad sets;hip IR;hip ER   abdominal set, bent knee fallout  -      Recorded by [MJ] Micheal Cox, PT      Positioning and Restraints    Pre-Treatment Position in bed  - in bed  -ST in bed  -    Post Treatment Position bed  - bed  -ST chair  -    In Bed notified nsg;supine;call light within reach;encouraged to call for assist  - notified nsg;supine;call light within reach;encouraged to call for assist;legs elevated  -     In Chair   notified nsg;reclined;call light within reach;encouraged to call for assist;with nsg   LSO brace in place  -    Recorded by [MJ] Micheal Cox, PT [] Tawana Rangel, OTR [] Byron Paniagua, PT      User Key  (r) = Recorded By, (t) = Taken By, (c) = Cosigned By    Initials Name Effective Dates     Tawana Rangel, OTR 02/20/17 -      Byron Paniagua, PT 06/19/15 -     MJ Micheal Cox, PT 03/14/16 -                 IP PT Goals       03/20/17 1418 03/19/17 1100 03/18/17 1411    Bed Mobility PT LTG    Bed Mobility PT LTG, Date Established   03/18/17  -    Bed Mobility PT LTG, Time to Achieve   5 days  -    Bed Mobility PT LTG, Activity Type   roll left/roll right;supine to sit/sit to supine  -    Bed Mobility PT LTG, [place] Level   conditional independence  -    Bed Mobility PT Goal  LTG, Assist Device   bed rails  -    Bed Mobility PT LTG, Date Goal Reviewed 03/20/17  -MJ 03/19/17  - 03/18/17  -    Bed Mobility PT LTG, Outcome goal ongoing  - goal ongoing  - goal ongoing  -    Transfer Training PT LTG    Transfer Training PT LTG, Date Established   03/18/17  -    Transfer Training PT LTG, Time to Achieve   5 days  -    Transfer Training PT LTG, Activity Type   bed to chair /chair to bed;sit to stand/stand to sit  -    Transfer Training PT LTG, [place] Level   supervision required  -    Transfer Training PT LTG, Assist Device   walker, rolling  -    Transfer Training PT   LTG, Date Goal Reviewed 03/20/17  - 03/19/17  - 03/18/17  -    Transfer Training PT LTG, Outcome goal ongoing  - goal ongoing  - goal ongoing  -    Gait Training PT LTG    Gait Training Goal PT LTG, Date Established   03/18/17  -    Gait Training Goal PT LTG, Time to Achieve   5 days  -    Gait Training Goal PT LTG, [place] Level   supervision required  -    Gait Training Goal PT LTG, Assist Device   walker, rolling  -    Gait Training Goal PT LTG, Distance to Achieve   250  -    Gait Training Goal PT LTG, Date Goal Reviewed 03/20/17  - 03/19/17  - 03/18/17  -    Gait Training Goal PT LTG, Outcome goal ongoing  - goal ongoing  - goal ongoing  -    Patient Education PT LTG    Patient Education PT LTG, Date Established   03/18/17  -    Patient Education PT LTG, Time to Achieve   5 days  -    Patient Education PT LTG, Education Type   precaution per surgeon;brace use/care;posture/body mechanics;gait;transfers;bed mobility;progression of POC;benefits of activity  -    Patient Education PT LTG, Education Understanding   demonstrate adequately;verbalize understanding  -    Patient Education PT LTG, Date Goal Reviewed 03/20/17  - 03/19/17  - 03/18/17  -    Patient Education PT LTG Outcome goal ongoing  - goal ongoing  - goal ongoing  -      User Key  (r) = Recorded By, (t) = Taken By, (c) = Cosigned By    Initials Name Provider Type    HENRY Paniagua, PT Physical Therapist    KATHLEEN Cox, PT Physical Therapist          Physical Therapy Education     Title: PT OT SLP Therapies (Active)     Topic: Physical Therapy (Done)     Point: Mobility training (Done)    Learning Progress Summary    Learner Readiness Method Response Comment Documented by Status   Patient Acceptance E,D LUZ,NR Reinforce back precautions  03/20/17 1418 Done    Acceptance E DAVID ESCOBAR 03/19/17 1100 Done    Acceptance E DAVID ESCOBAR 03/18/17 1410 Done               Point: Home exercise program  (Done)    Learning Progress Summary    Learner Readiness Method Response Comment Documented by Status   Patient Acceptance E,D VU,NR Reinforce back precautions  03/20/17 1418 Done    Acceptance E VU,NR   03/19/17 1100 Done    Acceptance E VU,NR   03/18/17 1410 Done               Point: Body mechanics (Done)    Learning Progress Summary    Learner Readiness Method Response Comment Documented by Status   Patient Acceptance E,D VU,NR Reinforce back precautions  03/20/17 1418 Done    Acceptance E VU,NR   03/19/17 1100 Done    Acceptance E VU,NR   03/18/17 1410 Done               Point: Precautions (Done)    Learning Progress Summary    Learner Readiness Method Response Comment Documented by Status   Patient Acceptance E,D VU,NR Reinforce back precautions  03/20/17 1418 Done    Acceptance E VU,NR   03/19/17 1100 Done    Acceptance E VU,NR   03/18/17 1410 Done                      User Key     Initials Effective Dates Name Provider Type Discipline     06/19/15 -  Byron Paniagua, PT Physical Therapist PT     03/14/16 -  Micheal Cox, PT Physical Therapist PT                    PT Recommendation and Plan  Planned Therapy Interventions: balance training, bed mobility training, gait training, patient/family education, strengthening, transfer training, stair training  PT Frequency: daily, per priority policy  Plan of Care Review  Plan Of Care Reviewed With: patient  Progress: improving  Outcome Summary/Follow up Plan: Pt increased gait distance to 150 feet with CGA and demo good participation with ther ex. Pt still limited by pain and requires cues for sequencing. Will continue to progress mobility as able.           Outcome Measures       03/20/17 1342 03/20/17 1303 03/19/17 1005    How much help from another person do you currently need...    Turning from your back to your side while in flat bed without using bedrails? 3  -MJ  3  -JM    Moving from lying on back to sitting on the side of a flat bed  without bedrails? 3  -MJ  3  -JM    Moving to and from a bed to a chair (including a wheelchair)? 3  -MJ  3  -JM    Standing up from a chair using your arms (e.g., wheelchair, bedside chair)? 3  -MJ  3  -JM    Climbing 3-5 steps with a railing? 2  -MJ  2  -JM    To walk in hospital room? 3  -MJ  3  -JM    AM-PAC 6 Clicks Score 17  -MJ  17  -JM    How much help from another is currently needed...    Putting on and taking off regular lower body clothing?  2  -ST     Bathing (including washing, rinsing, and drying)  2  -ST     Toileting (which includes using toilet bed pan or urinal)  2  -ST     Putting on and taking off regular upper body clothing  3  -ST     Taking care of personal grooming (such as brushing teeth)  4  -ST     Eating meals  4  -ST     Score  17  -ST     Functional Assessment    Outcome Measure Options AM-PAC 6 Clicks Basic Mobility (PT)  -[facility]-PAC 6 Clicks Basic Mobility (PT)  -      03/18/17 1308 03/18/17 1307       How much help from another person do you currently need...    Turning from your back to your side while in flat bed without using bedrails? 3  -JM      Moving from lying on back to sitting on the side of a flat bed without bedrails? 3  -JM      Moving to and from a bed to a chair (including a wheelchair)? 3  -JM      Standing up from a chair using your arms (e.g., wheelchair, bedside chair)? 3  -JM      Climbing 3-5 steps with a railing? 2  -JM      To walk in hospital room? 3  -JM      AM-PAC 6 Clicks Score 17  -JM      How much help from another is currently needed...    Putting on and taking off regular lower body clothing?  2  -AC     Bathing (including washing, rinsing, and drying)  2  -AC     Toileting (which includes using toilet bed pan or urinal)  3  -AC     Putting on and taking off regular upper body clothing  3  -AC     Taking care of personal grooming (such as brushing teeth)  4  -AC     Eating meals  4  -AC     Score  18  -AC     Functional Assessment    Outcome Measure  Options AM-PAC 6 Clicks Basic Mobility (PT)  -HENRY AM-PAC 6 Clicks Daily Activity (OT)  -AC       User Key  (r) = Recorded By, (t) = Taken By, (c) = Cosigned By    Initials Name Provider Type    AC Florinda Joshua, OT Occupational Therapist    ST Tawana Rangel, OTR Occupational Therapist    HENRY Paniagua, PT Physical Therapist    KATHLEEN Cox, PT Physical Therapist           Time Calculation:         PT Charges       03/20/17 1420          Time Calculation    Start Time 1342  -MJ      PT Received On 03/20/17  -      PT Goal Re-Cert Due Date 03/28/17  -      Time Calculation- PT    Total Timed Code Minutes- PT 28 minute(s)  -        User Key  (r) = Recorded By, (t) = Taken By, (c) = Cosigned By    Initials Name Provider Type    KATHLEEN Cox, PT Physical Therapist          Therapy Charges for Today     Code Description Service Date Service Provider Modifiers Qty    52227654208 HC GAIT TRAINING EA 15 MIN 3/20/2017 Micheal Cox, PT GP 1    [id number] HC PT THER PROC EA 15 MIN 3/20/2017 Micheal Cox, PT GP 1          PT G-Codes  Outcome Measure Options: AM-PAC 6 Clicks Basic Mobility (PT)    Micheal Cox PT  3/20/2017

## 2017-03-21 VITALS
OXYGEN SATURATION: 93 % | HEIGHT: 60 IN | HEART RATE: 94 BPM | DIASTOLIC BLOOD PRESSURE: 51 MMHG | WEIGHT: 129 LBS | RESPIRATION RATE: 18 BRPM | SYSTOLIC BLOOD PRESSURE: 121 MMHG | TEMPERATURE: 98.4 F | BODY MASS INDEX: 25.32 KG/M2

## 2017-03-21 PROCEDURE — 99024 POSTOP FOLLOW-UP VISIT: CPT | Performed by: NEUROLOGICAL SURGERY

## 2017-03-21 PROCEDURE — 94799 UNLISTED PULMONARY SVC/PX: CPT

## 2017-03-21 RX ORDER — HYDROCODONE BITARTRATE AND ACETAMINOPHEN 10; 325 MG/1; MG/1
1 TABLET ORAL EVERY 6 HOURS PRN
Qty: 45 TABLET | Refills: 0
Start: 2017-03-21 | End: 2017-06-05

## 2017-03-21 RX ADMIN — BACLOFEN 10 MG: 10 TABLET ORAL at 08:27

## 2017-03-21 RX ADMIN — ASPIRIN 81 MG CHEWABLE TABLET 81 MG: 81 TABLET CHEWABLE at 08:27

## 2017-03-21 RX ADMIN — CETIRIZINE HYDROCHLORIDE 10 MG: 10 TABLET, FILM COATED ORAL at 08:28

## 2017-03-21 RX ADMIN — CELECOXIB 200 MG: 200 CAPSULE ORAL at 08:27

## 2017-03-21 RX ADMIN — PANTOPRAZOLE SODIUM 40 MG: 40 TABLET, DELAYED RELEASE ORAL at 04:49

## 2017-03-21 RX ADMIN — Medication 1 TABLET: at 08:27

## 2017-03-21 RX ADMIN — Medication 1 TABLET: at 08:28

## 2017-03-21 RX ADMIN — HYDROCODONE BITARTRATE AND ACETAMINOPHEN 1 TABLET: 10; 325 TABLET ORAL at 12:08

## 2017-03-21 RX ADMIN — POTASSIUM CHLORIDE 10 MEQ: 750 CAPSULE, EXTENDED RELEASE ORAL at 08:27

## 2017-03-21 RX ADMIN — HYDROCHLOROTHIAZIDE 12.5 MG: 12.5 TABLET ORAL at 08:27

## 2017-03-21 RX ADMIN — GABAPENTIN 600 MG: 300 CAPSULE ORAL at 04:49

## 2017-03-21 RX ADMIN — NIFEDIPINE 30 MG: 30 TABLET, FILM COATED, EXTENDED RELEASE ORAL at 04:49

## 2017-03-21 RX ADMIN — HYDROCODONE BITARTRATE AND ACETAMINOPHEN 1 TABLET: 10; 325 TABLET ORAL at 04:49

## 2017-03-21 NOTE — PLAN OF CARE
Problem: Patient Care Overview (Adult)  Goal: Plan of Care Review  Outcome: Outcome(s) achieved Date Met:  03/21/17 03/21/17 1510   Coping/Psychosocial Response Interventions   Plan Of Care Reviewed With patient   Patient Care Overview   Progress improving   Outcome Evaluation   Outcome Summary/Follow up Plan Patient was able to leave today to go to a rehab place. Vital signs are stable and pain is controlled with medication. Patient can ambulate with x1 assist and a cane.          Problem: Perioperative Period (Adult)  Goal: Signs and Symptoms of Listed Potential Problems Will be Absent or Manageable (Perioperative Period)  Outcome: Ongoing (interventions implemented as appropriate)    03/21/17 1510   Perioperative Period   Problems Assessed (Perioperative Period) all   Problems Present (Perioperative Period) pain         03/21/17 1510   Perioperative Period   Problems Assessed (Perioperative Period) all   Problems Present (Perioperative Period) none         03/21/17 1510   Perioperative Period   Problems Assessed (Perioperative Period) all   Problems Present (Perioperative Period) pain

## 2017-03-21 NOTE — PLAN OF CARE
Problem: Perioperative Period (Adult)  Goal: Signs and Symptoms of Listed Potential Problems Will be Absent or Manageable (Perioperative Period)  Outcome: Outcome(s) achieved Date Met:  03/21/17 03/21/17 1929   Perioperative Period   Problems Assessed (Perioperative Period) all   Problems Present (Perioperative Period) none

## 2017-03-21 NOTE — DISCHARGE SUMMARY
DISCHARGE SUMMARY    Date of Admission: 3/17/2017    Date of Discharge:  3/21/2017    Discharge Diagnosis: Lumbar stenosis with spondylolisthesis L3-4    Procedures Performed:  Procedure(s):  LUMBAR FUSION DECOMPRESSON WITH PEDICLE SCREWS L3-4     Referring Physician: Tobin Acosta MD    Presenting Problem/History of Present Illness  Spondylolisthesis of lumbar region [M43.16]  Spondylolisthesis, acquired [M43.10]  Acquired spondylolisthesis [M43.10]     Hospital Course  Patient is a 77 y.o. female who presented with back and bilateral hip and leg pain progressive over 1 year and severe for 3 months.  MRI showed spondylolisthesis with stenosis at L3-4 and a prior L4-5 PLIF which had been performed in January 2013.    On the day of admission a posterior lumbar interbody fusion with pedicle screw fixation at L3 4 was performed, with removal of the prior pedicle screws and rods at L4 5.  Postoperatively the patient resumed ambulation on the day after surgery, resumed a normal diet, was able to resume normal bladder and bowel function.  Pain was managed orally after the first postoperative day using Percocet or hydrocodone 10 mg as needed.  Physical therapy was used to help assist in ambulation.    The patient has no help at home and requires a period of postoperative rehabilitation.  For this reason she will be transferred for temporary nursing home care and rehabilitation.  Wound closure is subcuticular.  Discharge pain medication is Norco 10, #45, every 6 hours when necessary pain.  Follow-up will be arranged in [place] [facility] in about 4 weeks.  The patient may shower and change dressings after showering.  A lumbar brace has been obtained and should be worn when the patient is upright sitting or standing.    Discharge Medications   Lisa Calloway   Home Medication Instructions MARYANN:[id number]    Printed on:03/21/17 1000   Medication Information                      amitriptyline (ELAVIL) 10 MG tablet  Take 20 mg by  mouth At Night As Needed.             aspirin 81 MG tablet  Take 1 tablet by mouth daily.             baclofen (LIORESAL) 10 MG tablet  Take 1 tablet by mouth 3 (Three) Times a Day.             busPIRone (BUSPAR) 10 MG tablet  Take 10 mg by mouth 4 (Four) Times a Day As Needed.             Calcium-Phosphorus-Vitamin D (CITRACAL +D3 PO)  Take 1 tablet by mouth Daily.             celecoxib (CELEBREX) 200 MG capsule  Take 200 mg by mouth 2 (Two) Times a Day.             fluticasone (FLONASE) 50 MCG/ACT nasal spray  1 spray into each nostril Daily.             furosemide (LASIX) 20 MG tablet  Take 20 mg by mouth Daily As Needed.             gabapentin (NEURONTIN) 600 MG tablet  Take 600 mg by mouth 3 (Three) Times a Day.             hydrochlorothiazide (MICROZIDE) 12.5 MG capsule  Take 12.5 mg by mouth Daily.             HYDROcodone-acetaminophen (NORCO) 7.5-325 MG per tablet  Take 1 tablet by mouth Every 8 (Eight) Hours As Needed for Moderate Pain (4-6).             loratadine (CLARITIN) 10 MG tablet  Take 10 mg by mouth Daily.             Multiple Vitamin (MULTI VITAMIN DAILY) tablet  Take 1 tablet by mouth Daily.             Multiple Vitamins-Minerals (HAIR SKIN AND NAILS FORMULA PO)  Take 1 tablet by mouth Daily.             NIFEdipine XL (PROCARDIA XL) 30 MG 24 hr tablet  Take 30 mg by mouth 3 (Three) Times a Day.             omeprazole (PriLOSEC) 20 MG capsule  Take 20 mg by mouth 2 (Two) Times a Day.             potassium chloride (MICRO-K) 10 MEQ CR capsule  Take 1 tablet by mouth 3 (Three) Times a Day.             PROAIR  (90 BASE) MCG/ACT inhaler  Inhale 2 puffs Every 6 (Six) Hours As Needed.             sennosides-docusate sodium (SENOKOT-S) 8.6-50 MG tablet  Take 1 tablet by mouth As Needed for Constipation.                 Sen Skinner MD  03/21/17  10:00 AM

## 2017-03-21 NOTE — DISCHARGE SUMMARY
Date of Discharge:  3/21/2017    Discharge Diagnosis: No listhesis.  Status post L3-L4 posterior lateral interbody fusion/ reclaim L5 pedicle screws.    Procedures Performed  Procedure(s):  LUMBAR FUSION DECOMPRESSON WITH PEDICLE SCREWS L3-4      Posterior lumbar interbody fusion with pedicle screw fixation L3-4, removal of pedicle screws L5  Consults:   Consults     No orders found from 2/16/2017 to 3/18/2017.          Presenting Problem/History of Present Illness  Spondylolisthesis of lumbar region [M43.16]  Spondylolisthesis, acquired [M43.10]  Acquired spondylolisthesis [M43.10]     Hospital Course  Patient is a 77 y.o. female presented with a history of progressive back and leg pain over the past year that has become extremely severe in the past 7-8 weeks, to the point that now she is taking hydrocodone 7.5 at least 3 times a day. She had an L4 5 PLIF in January 2013, 4 years ago for spondylolisthesis with stenosis..  Patient was noted to have a spondylolisthesis and spinal stenosis at L3 4.  Patient was deemed surgical candidate by Dr. Sen Skinner.  Surgery was uncomplicated without event.  Patient was minutes.  The floor of observation and pain control.  Patient is been participating with physical therapy, ambulating, taking food by mouth, voiding appropriately.    A should is to be discharged and transferred to skilled nursing facility.    Condition on Discharge:  Patient is in stable and satisfactory condition at discharge.  Incision is clean and dry.  No signs of infection, bleeding, or erythema.  Patient is ambulating, taking food by mouth and voiding appropriately.     Discharge Disposition    Patient is to be transferred to skilled nursing facility/rehabilitation.  Discharge Medications   Lisa Calloway   Home Medication Instructions MARYANN:[id number]    Printed on:03/21/17 0948   Medication Information                      amitriptyline (ELAVIL) 10 MG tablet  Take 20 mg by mouth At Night As Needed.              aspirin 81 MG tablet  Take 1 tablet by mouth daily.             baclofen (LIORESAL) 10 MG tablet  Take 1 tablet by mouth 3 (Three) Times a Day.             busPIRone (BUSPAR) 10 MG tablet  Take 10 mg by mouth 4 (Four) Times a Day As Needed.             Calcium-Phosphorus-Vitamin D (CITRACAL +D3 PO)  Take 1 tablet by mouth Daily.             celecoxib (CELEBREX) 200 MG capsule  Take 200 mg by mouth 2 (Two) Times a Day.             fluticasone (FLONASE) 50 MCG/ACT nasal spray  1 spray into each nostril Daily.             furosemide (LASIX) 20 MG tablet  Take 20 mg by mouth Daily As Needed.             gabapentin (NEURONTIN) 600 MG tablet  Take 600 mg by mouth 3 (Three) Times a Day.             hydrochlorothiazide (MICROZIDE) 12.5 MG capsule  Take 12.5 mg by mouth Daily.             HYDROcodone-acetaminophen (NORCO) 7.5-325 MG per tablet  Take 1 tablet by mouth Every 8 (Eight) Hours As Needed for Moderate Pain (4-6).             loratadine (CLARITIN) 10 MG tablet  Take 10 mg by mouth Daily.             Multiple Vitamin (MULTI VITAMIN DAILY) tablet  Take 1 tablet by mouth Daily.             Multiple Vitamins-Minerals (HAIR SKIN AND NAILS FORMULA PO)  Take 1 tablet by mouth Daily.             NIFEdipine XL (PROCARDIA XL) 30 MG 24 hr tablet  Take 30 mg by mouth 3 (Three) Times a Day.             omeprazole (PriLOSEC) 20 MG capsule  Take 20 mg by mouth 2 (Two) Times a Day.             potassium chloride (MICRO-K) 10 MEQ CR capsule  Take 1 tablet by mouth 3 (Three) Times a Day.             PROAIR  (90 BASE) MCG/ACT inhaler  Inhale 2 puffs Every 6 (Six) Hours As Needed.             sennosides-docusate sodium (SENOKOT-S) 8.6-50 MG tablet  Take 1 tablet by mouth As Needed for Constipation.                 Activity at Discharge:     Follow-up Appointments  Future Appointments  Date Time Provider Department Center   7/21/2017 1:45 PM Flako Lam MD [facility] None         Test Results Pending at  Discharge       Pio Ibarra PA-C  03/21/17  9:48 AM

## 2017-03-21 NOTE — PLAN OF CARE
Problem: Patient Care Overview (Adult)  Goal: Plan of Care Review  Outcome: Ongoing (interventions implemented as appropriate)    03/21/17 0616   Coping/Psychosocial Response Interventions   Plan Of Care Reviewed With patient   Patient Care Overview   Progress improving   Outcome Evaluation   Outcome Summary/Follow up Plan Pt. did well during the night. She did have some pain in the morning but felt like she splept wrong.

## 2017-03-23 NOTE — THERAPY DISCHARGE NOTE
Acute Care - Occupational Therapy Discharge Summary  [facility]     Patient Name: Lisa Calloway  : 1940  MRN: [id number]    Today's Date: 3/23/2017  Onset of Illness/Injury or Date of Surgery Date: 17    Date of Referral to OT: 17  Referring Physician: Brody      Admit Date: 3/17/2017        OT Recommendation and Plan    Visit Dx:    ICD-10-CM ICD-9-CM   1. Impaired functional mobility, balance, gait, and endurance Z74.09 V49.89   2. Acquired spondylolisthesis M43.10 738.4   3. Impaired mobility and ADLs Z74.09 799.89                     OT Goals       17 1303 17 1519       Bed Mobility OT LTG    Bed Mobility OT LTG, Date Established  17  -AC     Bed Mobility OT LTG, Time to Achieve  by discharge  -AC     Bed Mobility OT LTG, Activity Type  supine to sit/sit to supine  -AC     Bed Mobility OT LTG, [place] Level  supervision required  -AC     Bed Mobility OT LTG, Assist Device  bed rails  -AC     Bed Mobility OT LTG, Outcome goal met  -ST      Bathing OT LTG    Bathing Goal OT LTG, Date Established  17  -AC     Bathing Goal OT LTG, Time to Achieve  by discharge  -AC     Bathing Goal OT LTG, Activity Type  simulates;lower body bathing  -AC     Bathing Goal OT LTG, Assist Device  sponge, long handled  -AC     Bathing Goal OT LTG, Outcome goal ongoing  -ST      LB Dressing OT LTG    LB Dressing Goal OT LTG, Date Established  17  -AC     LB Dressing Goal OT LTG, Time to Achieve  by discharge  -AC     LB Dressing Goal OT LTG, [place] Level  minimum assist (75% patient effort)  -AC     LB Dressing Goal OT LTG, Adaptive Equipment  reacher;shoe horn, long handled;sock-aid  -AC     LB Dressing Goal OT LTG, Outcome goal ongoing  -ST      Functional Mobility OT LTG    Functional Mobility Goal OT LTG, Date Established  17  -AC     Functional Mobility Goal OT LTG, Time to Achieve  by discharge  -AC     Functional Mobility Goal OT LTG, [place] Level   supervision  -AC     Functional Mobility Goal OT LTG, Assist Device  rolling walker  -AC     Functional Mobility Goal OT LTG, Distance to Achieve  in hallway;to the bathroom  -AC     Functional Mobility Goal OT LTG, Outcome goal ongoing  -ST        User Key  (r) = Recorded By, (t) = Taken By, (c) = Cosigned By    Initials Name Provider Type    AC Florinda Joshua, OT Occupational Therapist    ST Tawana Rangel, OTR Occupational Therapist                Outcome Measures       03/20/17 1342          How much help from another person do you currently need...    Turning from your back to your side while in flat bed without using bedrails? 3  -MJ      Moving from lying on back to sitting on the side of a flat bed without bedrails? 3  -MJ      Moving to and from a bed to a chair (including a wheelchair)? 3  -MJ      Standing up from a chair using your arms (e.g., wheelchair, bedside chair)? 3  -MJ      Climbing 3-5 steps with a railing? 2  -MJ      To walk in hospital room? 3  -MJ      AM-PAC 6 Clicks Score 17  -MJ      Functional Assessment    Outcome Measure Options AM-PAC 6 Clicks Basic Mobility (PT)  -MJ        User Key  (r) = Recorded By, (t) = Taken By, (c) = Cosigned By    Initials Name Provider Type    KATHLEEN Cox, PT Physical Therapist              OT Discharge Summary  Reason for Discharge: Discharge from facility  Outcomes Achieved: Refer to plan of care for updates on goals achieved  Discharge Destination: Home      Meseret Callaway OT  3/23/2017

## 2017-03-25 NOTE — THERAPY DISCHARGE NOTE
Acute Care - Physical Therapy Discharge Summary  [facility]       Patient Name: Lisa Calloway  : 1940  MRN: [id number]    Today's Date: 3/24/2017  Onset of Illness/Injury or Date of Surgery Date: 17    Date of Referral to PT: 17  Referring Physician: Brody      Admit Date: 3/17/2017      PT Recommendation and Plan    Visit Dx:    ICD-10-CM ICD-9-CM   1. Impaired functional mobility, balance, gait, and endurance Z74.09 V49.89   2. Acquired spondylolisthesis M43.10 738.4   3. Impaired mobility and ADLs Z74.09 799.89                       IP PT Goals       17 1418 17 1100 17 1411    Bed Mobility PT LTG    Bed Mobility PT LTG, Date Established   17  -    Bed Mobility PT LTG, Time to Achieve   5 days  -    Bed Mobility PT LTG, Activity Type   roll left/roll right;supine to sit/sit to supine  -    Bed Mobility PT LTG, [place] Level   conditional independence  -    Bed Mobility PT Goal  LTG, Assist Device   bed rails  -    Bed Mobility PT LTG, Date Goal Reviewed 17  -MJ 17  -JM 17  -    Bed Mobility PT LTG, Outcome goal ongoing  - goal ongoing  - goal ongoing  -    Transfer Training PT LTG    Transfer Training PT LTG, Date Established   17  -    Transfer Training PT LTG, Time to Achieve   5 days  -    Transfer Training PT LTG, Activity Type   bed to chair /chair to bed;sit to stand/stand to sit  -    Transfer Training PT LTG, [place] Level   supervision required  -    Transfer Training PT LTG, Assist Device   walker, rolling  -    Transfer Training PT  LTG, Date Goal Reviewed 17  -MJ 17  -JM 17  -    Transfer Training PT LTG, Outcome goal ongoing  - goal ongoing  - goal ongoing  -    Gait Training PT LTG    Gait Training Goal PT LTG, Date Established   17  -    Gait Training Goal PT LTG, Time to Achieve   5 days  -    Gait Training Goal PT LTG, [place] Level   supervision  required  -    Gait Training Goal PT LTG, Assist Device   walker, rolling  -    Gait Training Goal PT LTG, Distance to Achieve   250  -    Gait Training Goal PT LTG, Date Goal Reviewed 03/20/17  - 03/19/17  - 03/18/17  -    Gait Training Goal PT LTG, Outcome goal ongoing  - goal ongoing  - goal ongoing  -    Patient Education PT LTG    Patient Education PT LTG, Date Established   03/18/17  -    Patient Education PT LTG, Time to Achieve   5 days  -    Patient Education PT LTG, Education Type   precaution per surgeon;brace use/care;posture/body mechanics;gait;transfers;bed mobility;progression of POC;benefits of activity  -    Patient Education PT LTG, Education Understanding   demonstrate adequately;verbalize understanding  -    Patient Education PT LTG, Date Goal Reviewed 03/20/17  - 03/19/17  - 03/18/17  -    Patient Education PT LTG Outcome goal ongoing  - goal ongoing  - goal ongoing  -      User Key  (r) = Recorded By, (t) = Taken By, (c) = Cosigned By    Initials Name Provider Type    HENRY Paniagua, PT Physical Therapist    KATHLEEN Cox, PT Physical Therapist           Goals Status: Treatment plan discontinued secondary to discharge from acute facility.      PT Discharge Summary  Reason for Discharge: Discharge from facility  Outcomes Achieved: Refer to plan of care for updates on goals achieved  Discharge Destination: SNF      Corrina Mercedes, PT   3/24/2017

## 2017-04-17 ENCOUNTER — OFFICE VISIT (OUTPATIENT)
Dept: NEUROSURGERY | Facility: CLINIC | Age: 77
End: 2017-04-17

## 2017-04-17 VITALS
TEMPERATURE: 97.9 F | BODY MASS INDEX: 25.32 KG/M2 | HEIGHT: 60 IN | DIASTOLIC BLOOD PRESSURE: 62 MMHG | SYSTOLIC BLOOD PRESSURE: 158 MMHG | WEIGHT: 129 LBS

## 2017-04-17 DIAGNOSIS — M43.16 SPONDYLOLISTHESIS, LUMBAR REGION: Primary | ICD-10-CM

## 2017-04-17 DIAGNOSIS — M48.062 SPINAL STENOSIS, LUMBAR REGION, WITH NEUROGENIC CLAUDICATION: ICD-10-CM

## 2017-04-17 PROCEDURE — 99024 POSTOP FOLLOW-UP VISIT: CPT | Performed by: PHYSICIAN ASSISTANT

## 2017-04-17 NOTE — PROGRESS NOTES
Lisa Calloway    1940 04/17/2017    [id number]      Chief Complaint   Patient presents with   • Follow-up   • Post-op       HPI:    Past Medical History:   Diagnosis Date   • Anxiety    • Arthritis    • Calcinosis-Raynaud's sclerodactyly-telangiectasia syndrome    • COPD (chronic obstructive pulmonary disease)    • Diastolic heart failure 12/9/2016    Echocardiogram, 08/10/2011: LVH, LVEF 70% and no significant valvular abnormality.  Pharmacologic MPS, 07/28/2011: normal, LVEF 80%. Recurrent chest pain/CHF presentations to the [place], [place] area. Cardiac catheterization, 05/23/2012: Normal coronary arteries, LVEF 65%, LVEDP 32 mmHg. Renal angiography, 05/23/2012: normal bilateral renal arteries.  Echocardiogram for worsening dyspnea, 03/ • GERD (gastroesophageal reflux disease)    • Heart murmur    • Hiatal hernia    • High cholesterol    • History of recurrent UTIs    • History of staph infection     after hip replacement   • History of stomach ulcers    • Hypertension    • Low back pain    • Lumbar herniated disc     with right-sided sciatica:Status post lumbar fusion, 01/17/2013.   • Panic attacks    • SOB (shortness of breath)    • Wears dentures     upper denture and partial   • Wears eyeglasses        Allergies   Allergen Reactions   • Amlodipine GI Intolerance   • Beta Adrenergic Blockers    • Ergotamine-Caffeine    • Lisinopril Other (See Comments)     hypotension   • Metoprolol GI Intolerance   • Other    • Tenormin [Atenolol] GI Intolerance   • Trovan [Trovafloxacin]    • Atorvastatin Myalgia   • Cefprozil Rash   • Codeine GI Intolerance   • Contrast Dye Swelling   • Coreg [Carvedilol] GI Intolerance   • Fish-Derived Products Rash   • Penicillins Hives   • Sectral [Acebutolol Hcl] Rash   • Sulfa Antibiotics Rash   • Tetracycline Rash         Current Outpatient Prescriptions:   •  amitriptyline (ELAVIL) 10 MG tablet, Take 20 mg by mouth At Night As Needed., Disp: , Rfl:   •  aspirin 81 MG  tablet, Take 1 tablet by mouth daily., Disp: , Rfl:   •  baclofen (LIORESAL) 10 MG tablet, Take 1 tablet by mouth 3 (Three) Times a Day., Disp: , Rfl:   •  busPIRone (BUSPAR) 10 MG tablet, Take 10 mg by mouth 4 (Four) Times a Day As Needed., Disp: , Rfl: 4  •  Calcium-Phosphorus-Vitamin D (CITRACAL +D3 PO), Take 1 tablet by mouth Daily., Disp: , Rfl:   •  celecoxib (CELEBREX) 200 MG capsule, Take 200 mg by mouth 2 (Two) Times a Day., Disp: , Rfl:   •  fluticasone (FLONASE) 50 MCG/ACT nasal spray, 1 spray into each nostril Daily., Disp: , Rfl: 1  •  furosemide (LASIX) 20 MG tablet, Take 20 mg by mouth Daily As Needed., Disp: , Rfl:   •  gabapentin (NEURONTIN) 600 MG tablet, Take 600 mg by mouth 3 (Three) Times a Day., Disp: , Rfl:   •  hydrochlorothiazide (MICROZIDE) 12.5 MG capsule, Take 12.5 mg by mouth Daily., Disp: , Rfl:   •  HYDROcodone-acetaminophen (NORCO)  MG per tablet, Take 1 tablet by mouth Every 6 (Six) Hours As Needed for Moderate Pain (4-6)., Disp: 45 tablet, Rfl: 0  •  loratadine (CLARITIN) 10 MG tablet, Take 10 mg by mouth Daily., Disp: , Rfl: 1  •  Multiple Vitamin (MULTI VITAMIN DAILY) tablet, Take 1 tablet by mouth Daily., Disp: , Rfl:   •  Multiple Vitamins-Minerals (HAIR SKIN AND NAILS FORMULA PO), Take 1 tablet by mouth Daily., Disp: , Rfl:   •  NIFEdipine XL (PROCARDIA XL) 30 MG 24 hr tablet, Take 30 mg by mouth 3 (Three) Times a Day., Disp: , Rfl:   •  omeprazole (PriLOSEC) 20 MG capsule, Take 20 mg by mouth 2 (Two) Times a Day., Disp: , Rfl:   •  potassium chloride (MICRO-K) 10 MEQ CR capsule, Take 1 tablet by mouth 3 (Three) Times a Day., Disp: , Rfl:   •  PROAIR  (90 BASE) MCG/ACT inhaler, Inhale 2 puffs Every 6 (Six) Hours As Needed., Disp: , Rfl:   •  sennosides-docusate sodium (SENOKOT-S) 8.6-50 MG tablet, Take 1 tablet by mouth As Needed for Constipation., Disp: , Rfl:     Social History     Social History   • Marital status:      Spouse name: N/A   • Number of  children: N/A   • Years of education: N/A     Social History Main Topics   • Smoking status: Never Smoker   • Smokeless tobacco: Never Used   • Alcohol use No   • Drug use: No   • Sexual activity: Defer     Other Topics Concern   • None     Social History Narrative       Family History   Problem Relation Age of Onset   • Congenital heart disease Mother    • Hypertension Mother    • Stroke Mother    • Cancer Other    • Gout Other        Review of Systems      PE:  Physical Exam    Neurologic Exam      MDM        Shari Rivas PA-C  No att. providers found

## 2017-04-19 ENCOUNTER — TELEPHONE (OUTPATIENT)
Dept: NEUROSURGERY | Facility: CLINIC | Age: 77
End: 2017-04-19

## 2017-04-19 NOTE — TELEPHONE ENCOUNTER
Provider:  Brody  Caller: Lisa Calloway  Time of call:   01:47 PM  Phone #:  [phone]  Surgery:  LUMBAR FUSION DECOMPRESSON WITH PEDICLE SCREWS L3-4   Surgery Date:  03/17/17  Last visit:   04/17/17  Next visit: 06/05/17         Reason for call:       ----- Message from Gosia Hernández sent at 4/19/2017  1:47 PM EDT -----  Regarding: Patient Needs PT order faxed/Patient has questions about driving/Brody  Contact: [phone]    Patient is post-op - saw Ramy 4-17 needs to ask about driving short distances to bank, etc.      Also she is to go to PT, but was not given an order.  Please fax PT order to [phone]. (PT order is under the referrals tab)    Thank you.    Gosia

## 2017-04-19 NOTE — TELEPHONE ENCOUNTER
Patient notified that her PT order is being faxed over.  She may drive but only very short distances.

## 2017-04-21 DIAGNOSIS — R92.1 BREAST CALCIFICATIONS: Primary | ICD-10-CM

## 2017-04-24 RX ORDER — HYDROCODONE BITARTRATE AND ACETAMINOPHEN 7.5; 325 MG/1; MG/1
1 TABLET ORAL EVERY 6 HOURS PRN
Qty: 45 TABLET | Refills: 0 | Status: SHIPPED | OUTPATIENT
Start: 2017-04-24 | End: 2017-06-05

## 2017-04-24 RX ORDER — HYDROCODONE BITARTRATE AND ACETAMINOPHEN 10; 325 MG/1; MG/1
1 TABLET ORAL EVERY 6 HOURS PRN
Qty: 45 TABLET | Refills: 0 | Status: CANCELLED | OUTPATIENT
Start: 2017-04-24

## 2017-04-24 NOTE — TELEPHONE ENCOUNTER
Provider:  Jak  Caller: Lisa Calloway  Time of call:   10:00 AM  Phone #:  [phone]  Surgery:  LUMBAR FUSION DECOMPRESSON WITH PEDICLE SCREWS L3-4   Surgery Date:  03/17/17  Last visit:   04/17/17  Next visit: 06/05/17    JOCELYN:     03/21/2017 Hydrocodone/Acetaminophen  325MG/10MG 1940 #45 22 days Sen Skinner    Reason for call:       Pt calling for refill of pain meds

## 2017-04-28 ENCOUNTER — TELEPHONE (OUTPATIENT)
Dept: SURGERY | Facility: CLINIC | Age: 77
End: 2017-04-28

## 2017-04-28 NOTE — TELEPHONE ENCOUNTER
Called Miss Gonzalez about getting her Mammogram and she stated she had just had back surgery and did not know when she would be able to get that done. Patient states she understands that it is due but will call us when she is ready.Did not want a reminder call or to be put on a recall list for latter.

## 2017-05-08 RX ORDER — HYDROCODONE BITARTRATE AND ACETAMINOPHEN 7.5; 325 MG/1; MG/1
1 TABLET ORAL EVERY 6 HOURS PRN
Qty: 45 TABLET | Refills: 0 | Status: SHIPPED | OUTPATIENT
Start: 2017-05-08 | End: 2017-05-22 | Stop reason: SDUPTHER

## 2017-05-08 RX ORDER — HYDROCODONE BITARTRATE AND ACETAMINOPHEN 10; 325 MG/1; MG/1
1 TABLET ORAL EVERY 6 HOURS PRN
Qty: 45 TABLET | Refills: 0 | Status: CANCELLED
Start: 2017-05-08

## 2017-05-10 ENCOUNTER — APPOINTMENT (OUTPATIENT)
Dept: MAMMOGRAPHY | Facility: HOSPITAL | Age: 77
End: 2017-05-10
Attending: SURGERY

## 2017-05-22 RX ORDER — HYDROCODONE BITARTRATE AND ACETAMINOPHEN 7.5; 325 MG/1; MG/1
1 TABLET ORAL EVERY 6 HOURS PRN
Qty: 50 TABLET | Refills: 0 | Status: SHIPPED | OUTPATIENT
Start: 2017-05-22 | End: 2017-06-05 | Stop reason: SDUPTHER

## 2017-05-23 DIAGNOSIS — Z98.1 S/P LUMBAR FUSION: Primary | ICD-10-CM

## 2017-06-05 ENCOUNTER — OFFICE VISIT (OUTPATIENT)
Dept: NEUROSURGERY | Facility: CLINIC | Age: 77
End: 2017-06-05

## 2017-06-05 DIAGNOSIS — Z98.1 STATUS POST LUMBAR SPINAL FUSION: Primary | ICD-10-CM

## 2017-06-05 PROCEDURE — 99024 POSTOP FOLLOW-UP VISIT: CPT | Performed by: NEUROLOGICAL SURGERY

## 2017-06-05 RX ORDER — HYDROCODONE BITARTRATE AND ACETAMINOPHEN 7.5; 325 MG/1; MG/1
1 TABLET ORAL EVERY 8 HOURS PRN
Qty: 90 TABLET | Refills: 0
Start: 2017-06-05 | End: 2017-09-28

## 2017-06-05 NOTE — PROGRESS NOTES
Subjective   Lisa Calloway is a 77 y.o. female who presents for follow up of  PLIF L3-4.     The patient had a PLIF with pedicle screws at L3 4 2-1/2 months ago on 3/17/17.  There was a prior PLIF at L4 5 done for years previously.  She is doing well, but still using Norco 7 twice or 3 times a day.  She is wearing a lumbar brace.  Preoperative back pain and leg pain is clearly better.    The following portions of the patient's history were reviewed, updated as appropriate and approved: allergies, current medications, past family history, past medical history, past social history, past surgical history, review of systems and problem list.     Review of Systems   Constitutional: Negative for activity change, appetite change, chills, diaphoresis, fatigue, fever and unexpected weight change.   HENT: Negative for congestion, dental problem, drooling, ear discharge, ear pain, facial swelling, hearing loss, mouth sores, nosebleeds, postnasal drip, rhinorrhea, sinus pressure, sneezing, sore throat, tinnitus, trouble swallowing and voice change.    Eyes: Negative for photophobia, pain, discharge, redness, itching and visual disturbance.   Respiratory: Negative for apnea, cough, choking, chest tightness, shortness of breath, wheezing and stridor.    Cardiovascular: Negative for chest pain, palpitations and leg swelling.   Gastrointestinal: Negative for abdominal distention, abdominal pain, anal bleeding, blood in stool, constipation, diarrhea, nausea, rectal pain and vomiting.   Endocrine: Negative for cold intolerance, heat intolerance, polydipsia, polyphagia and polyuria.   Genitourinary: Negative for decreased urine volume, difficulty urinating, dysuria, enuresis, flank pain, frequency, genital sores, hematuria and urgency.   Musculoskeletal: Negative for arthralgias, back pain, gait problem, joint swelling, myalgias, neck pain and neck stiffness.   Skin: Negative for color change, pallor, rash and wound.    Allergic/Immunologic: Negative for environmental allergies, food allergies and immunocompromised state.   Neurological: Negative for dizziness, tremors, seizures, syncope, facial asymmetry, speech difficulty, weakness, light-headedness, numbness and headaches.   Hematological: Negative for adenopathy. Does not bruise/bleed easily.   Psychiatric/Behavioral: Negative for agitation, behavioral problems, confusion, decreased concentration, dysphoric mood, hallucinations, self-injury, sleep disturbance and suicidal ideas. The patient is not nervous/anxious and is not hyperactive.        Objective    NEUROLOGICAL EXAMINATION:    Lumbar wounds are well-healed.  No neurologic deficit.    Assessment   2-1/2 months postop PLIF L3-4 with pedicle screws.       Plan   Renewal final prescription for Norco 7.5, #90.  Follow-up in 6 months with x-rays of the lumbar spine.       Sen Skinner MD

## 2017-07-21 ENCOUNTER — OFFICE VISIT (OUTPATIENT)
Dept: CARDIOLOGY | Facility: CLINIC | Age: 77
End: 2017-07-21

## 2017-07-21 VITALS
DIASTOLIC BLOOD PRESSURE: 66 MMHG | HEART RATE: 94 BPM | HEIGHT: 60 IN | WEIGHT: 128.6 LBS | SYSTOLIC BLOOD PRESSURE: 144 MMHG | BODY MASS INDEX: 25.25 KG/M2

## 2017-07-21 DIAGNOSIS — I10 ESSENTIAL HYPERTENSION: ICD-10-CM

## 2017-07-21 DIAGNOSIS — R00.2 PALPITATIONS: Primary | ICD-10-CM

## 2017-07-21 DIAGNOSIS — I73.00 RAYNAUD'S DISEASE WITHOUT GANGRENE: ICD-10-CM

## 2017-07-21 DIAGNOSIS — I50.32 CHRONIC DIASTOLIC HEART FAILURE (HCC): ICD-10-CM

## 2017-07-21 PROCEDURE — 99213 OFFICE O/P EST LOW 20 MIN: CPT | Performed by: NURSE PRACTITIONER

## 2017-07-21 NOTE — PROGRESS NOTES
Encounter Date:07/21/2017    Patient ID: Lisa Calloway is a 77 y.o. female who is  and resides in [place], [place].  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     CC/Reason for visit:  Palpitations and Hypertension          Lisa Calloway returns today for follow-up for chronic diastolic heart failure, hypertension, Raynaud's disease and palpitations.  Since her last visit she underwent a spinal fusion with Dr. Sen Skinner at [facility].  She has recovered well and it has helped relieve her back and right lower extremity discomfort.  She feels her breathing has remained stable and has not worsened.  She uses her Lasix only on rare occasions when she feels more short of breath than usual or has lower extremity edema.  She has been unable to tolerate any statin therapy because severe myalgias.  She has tried beta blockers and amlodipine in the past but was unable to tolerate due to allergic reactions.  She feels she is doing well from a cardiovascular standpoint and has no complaints.    Review of Systems   Constitution: Positive for weakness, malaise/fatigue and night sweats.   Cardiovascular: Positive for leg swelling.   Respiratory: Positive for shortness of breath.    Endocrine: Positive for heat intolerance.   Hematologic/Lymphatic: Bruises/bleeds easily.   Musculoskeletal: Positive for arthritis, back pain, joint swelling and muscle cramps.   Gastrointestinal: Positive for heartburn.   Psychiatric/Behavioral: The patient is nervous/anxious.    Allergic/Immunologic: Positive for environmental allergies.       The patient's past medical, social, and family history reviewed in the patient's electronic medical record.    Allergies  Amlodipine; Beta adrenergic blockers; Ergotamine-caffeine; Lisinopril; Metoprolol; Other; Tenormin [atenolol]; Trovan [trovafloxacin]; Atorvastatin; Cefprozil; Codeine; Contrast dye; Coreg [carvedilol]; Fish-derived products;  Penicillins; Sectral [acebutolol hcl]; Sulfa antibiotics; and Tetracycline    Outpatient Prescriptions Marked as Taking for the 7/21/17 encounter (Office Visit) with TITA Angel   Medication Sig Dispense Refill   • amitriptyline (ELAVIL) 10 MG tablet Take 20 mg by mouth At Night As Needed.     • aspirin 81 MG tablet Take 1 tablet by mouth daily.     • baclofen (LIORESAL) 10 MG tablet Take 1 tablet by mouth 3 (Three) Times a Day.     • busPIRone (BUSPAR) 10 MG tablet Take 10 mg by mouth 4 (Four) Times a Day As Needed.  4   • Calcium-Phosphorus-Vitamin D (CITRACAL +D3 PO) Take 1 tablet by mouth Daily.     • celecoxib (CELEBREX) 200 MG capsule Take 200 mg by mouth 2 (Two) Times a Day.     • fluticasone (FLONASE) 50 MCG/ACT nasal spray 1 spray into each nostril Daily.  1   • furosemide (LASIX) 20 MG tablet Take 20 mg by mouth Daily As Needed.     • gabapentin (NEURONTIN) 600 MG tablet Take 600 mg by mouth 3 (Three) Times a Day.     • hydrochlorothiazide (MICROZIDE) 12.5 MG capsule Take 12.5 mg by mouth Daily.     • HYDROcodone-acetaminophen (NORCO) 7.5-325 MG per tablet Take 1 tablet by mouth Every 8 (Eight) Hours As Needed for Severe Pain (7-10). 90 tablet 0   • loratadine (CLARITIN) 10 MG tablet Take 10 mg by mouth Daily.  1   • Multiple Vitamin (MULTI VITAMIN DAILY) tablet Take 1 tablet by mouth Daily.     • Multiple Vitamins-Minerals (HAIR SKIN AND NAILS FORMULA PO) Take 1 tablet by mouth Daily.     • NIFEdipine XL (PROCARDIA XL) 30 MG 24 hr tablet Take 30 mg by mouth 3 (Three) Times a Day.     • omeprazole (PriLOSEC) 20 MG capsule Take 20 mg by mouth 2 (Two) Times a Day.     • potassium chloride (MICRO-K) 10 MEQ CR capsule Take 1 tablet by mouth 3 (Three) Times a Day.     • PROAIR  (90 BASE) MCG/ACT inhaler Inhale 2 puffs Every 6 (Six) Hours As Needed.     • sennosides-docusate sodium (SENOKOT-S) 8.6-50 MG tablet Take 1 tablet by mouth As Needed for Constipation.           Blood pressure 144/66,  "pulse 94, height 60\" (152.4 cm), weight 128 lb 9.6 oz (58.3 kg), unknown if currently breastfeeding.  Body mass index is 25.12 kg/(m^2).    Physical Exam   Constitutional: She is oriented to person, place, and time. She appears well-developed and well-nourished.   HENT:   Head: Normocephalic and atraumatic.   Eyes: Pupils are equal, round, and reactive to light. No scleral icterus.   Neck: No JVD present. Carotid bruit is not present. No thyromegaly present.   Cardiovascular: Normal rate, regular rhythm, S1 normal and S2 normal.  Exam reveals no gallop.    No murmur heard.  Pulmonary/Chest: Effort normal and breath sounds normal.   Abdominal: Soft. There is no tenderness.   Neurological: She is alert and oriented to person, place, and time.   Skin: Skin is warm and dry. No cyanosis. Nails show no clubbing.   Psychiatric: She has a normal mood and affect. Her behavior is normal.       Data Review:   Procedures         Problem List Items Addressed This Visit        Cardiovascular and Mediastinum    Essential hypertension    Overview     · Renal angiography, (05/23/2012): normal bilateral renal arteries.          Current Assessment & Plan     · Hypertension is controlled  · Continue hydrochlorothiazide 12.5 mg daily         Raynaud disease    Current Assessment & Plan     · Continue Procardia 30 mg 3 times a day         Chronic diastolic heart failure    Overview     · Echo (8/10/2011): LVH. LVEF 70%.  No significant valvular abnormality.   · Pharmacologic nuclear stress (7/28/2011): No ischemia. LVEF 80%.  · Recurrent chest pain/CHF presentations to the [place], [place] area.  · Cardiac catheterization (5/23/2012):  Normal coronary arteries, LVEF 65%, LVEDP 32 mmHg.  · Echocardiogram for worsening dyspnea (3/18/2016): LVEF >65%. No significant valvular abnormalities. RVSP 43 mmHg.   · GXT (3/18//2016): Exercised for 3 minutes (6 minutes expected). No ischemic changes. Normal oxygen saturation.          Current " Assessment & Plan     · Continue aspirin 81 mg daily  · Beta blocker contraindicated due to renal disease  · Continue Lasix 20 mg as needed         Palpitations - Primary    Overview     · Essentially normal Holter monitor, 2012.  · Recurrent severe palpitations, October 2015.         Current Assessment & Plan     · Patient asymptomatic                    · Continue current medications  · Follow-up 6 months or sooner if needed    Bonny RIVERS evaluated treated this patient independently    TITA Stanley  7/21/2017

## 2017-07-21 NOTE — ASSESSMENT & PLAN NOTE
· Continue aspirin 81 mg daily  · Beta blocker contraindicated due to renal disease  · Continue Lasix 20 mg as needed

## 2017-08-01 DIAGNOSIS — R92.1 BREAST CALCIFICATIONS: Primary | ICD-10-CM

## 2017-08-28 ENCOUNTER — APPOINTMENT (OUTPATIENT)
Dept: MAMMOGRAPHY | Facility: HOSPITAL | Age: 77
End: 2017-08-28
Attending: SURGERY

## 2017-08-30 ENCOUNTER — TELEPHONE (OUTPATIENT)
Dept: NEUROSURGERY | Facility: CLINIC | Age: 77
End: 2017-08-30

## 2017-08-30 NOTE — TELEPHONE ENCOUNTER
"Patient was doing reasonably well then traveled to [place] for a shopping trip.  She reportedly did a lot of walking. She then \"pushed herself\" to go to her high school reunion after she was already having some increased pain.  Now she is quite sore in the right buttock and occasionally has pain in the leg. She is having trouble getting in a comfortable position at nighttime. She is most comfortable in her recliner.  I think she has over done it the past several weeks and advised her to scale back. She will rest, use ice/heat. If her symptoms do not improve, we will see her in the clinic with plain films. She will update us in a week or so  "

## 2017-08-30 NOTE — TELEPHONE ENCOUNTER
Provider:  Brody  Caller: Lisa    Phone #:  [phone]  Surgery:  PLIF L3-4  Surgery Date:  3/17/17  Last visit: 6/5/17    Next visit: n/a    JOCELYN:         Reason for call:           Pt states that for the past three weeks, she has had what she describes as a grabbing pain in her rt leg/buttock area.  States that it bothers her most at night, but does hurt of and on throughout the whole day. Wants to know if she needs to be seen sooner than appointment she was instructed to call for in December

## 2017-09-28 ENCOUNTER — HOSPITAL ENCOUNTER (OUTPATIENT)
Dept: MAMMOGRAPHY | Facility: HOSPITAL | Age: 77
Discharge: HOME | End: 2017-09-28
Attending: SURGERY | Admitting: SURGERY

## 2017-09-28 ENCOUNTER — OFFICE VISIT (OUTPATIENT)
Dept: SURGERY | Facility: CLINIC | Age: 77
End: 2017-09-28

## 2017-09-28 VITALS
BODY MASS INDEX: 21.58 KG/M2 | SYSTOLIC BLOOD PRESSURE: 174 MMHG | DIASTOLIC BLOOD PRESSURE: 78 MMHG | HEART RATE: 92 BPM | HEIGHT: 64 IN | WEIGHT: 126.4 LBS

## 2017-09-28 DIAGNOSIS — Z13.9 SCREENING: ICD-10-CM

## 2017-09-28 DIAGNOSIS — R92.8 ABNORMAL MAMMOGRAM: Primary | ICD-10-CM

## 2017-09-28 DIAGNOSIS — R92.1 BREAST CALCIFICATIONS: ICD-10-CM

## 2017-09-28 PROCEDURE — 99213 OFFICE O/P EST LOW 20 MIN: CPT | Performed by: SURGERY

## 2017-09-28 PROCEDURE — G0202 SCR MAMMO BI INCL CAD: HCPCS | Performed by: RADIOLOGY

## 2017-09-28 PROCEDURE — G0202 SCR MAMMO BI INCL CAD: HCPCS

## 2017-09-28 PROCEDURE — 77063 BREAST TOMOSYNTHESIS BI: CPT | Performed by: RADIOLOGY

## 2017-09-28 PROCEDURE — 76642 ULTRASOUND BREAST LIMITED: CPT | Performed by: SURGERY

## 2017-09-28 PROCEDURE — 77063 BREAST TOMOSYNTHESIS BI: CPT

## 2017-09-28 RX ORDER — TRAMADOL HYDROCHLORIDE 50 MG/1
50 TABLET ORAL EVERY 6 HOURS PRN
COMMUNITY

## 2017-09-28 NOTE — PROGRESS NOTES
Subjective   Lisa Calloway is a 77 y.o. female is here today for follow-up for continued breast care and to review mammogram.    History of Present Illness  Ms. Calloway was seen in the office today for breast follow-up.  This is a 76-year-old female who was seen in the office in April 2015 for abnormal ultrasound.  Ultimately we area on ultrasound was felt to be fibrocystic tissue and not a distinct lesion.  However the patient does have an extremely busy mammogram with multiple calcifications and close follow-up was recommended.  The patient returns to the office having had a bilateral mammogram earlier today.  She denies a palpable mass or nipple discharge.  She denies any change in her personal or family breast history.  She is not on hormone replacement therapy.  Her last bone density was 12/19/16 which was normal.  Since the patient's last annual follow-up she has had back surgery.  Allergies   Allergen Reactions   • Amlodipine GI Intolerance   • Beta Adrenergic Blockers    • Ergotamine-Caffeine    • Lisinopril Other (See Comments)     hypotension   • Metoprolol GI Intolerance   • Other    • Tenormin [Atenolol] GI Intolerance   • Trovan [Trovafloxacin]    • Atorvastatin Myalgia   • Cefprozil Rash   • Codeine GI Intolerance   • Contrast Dye Swelling   • Coreg [Carvedilol] GI Intolerance   • Fish-Derived Products Rash   • Penicillins Hives   • Sectral [Acebutolol Hcl] Rash   • Sulfa Antibiotics Rash   • Tetracycline Rash         Current Outpatient Prescriptions   Medication Sig Dispense Refill   • amitriptyline (ELAVIL) 10 MG tablet Take 20 mg by mouth At Night As Needed.     • aspirin 81 MG tablet Take 1 tablet by mouth daily.     • baclofen (LIORESAL) 10 MG tablet Take 1 tablet by mouth 3 (Three) Times a Day.     • busPIRone (BUSPAR) 10 MG tablet Take 10 mg by mouth 4 (Four) Times a Day As Needed.  4   • Calcium-Phosphorus-Vitamin D (CITRACAL +D3 PO) Take 1 tablet by mouth Daily.     • celecoxib (CELEBREX) 200  MG capsule Take 200 mg by mouth 2 (Two) Times a Day.     • fluticasone (FLONASE) 50 MCG/ACT nasal spray 1 spray into each nostril Daily.  1   • furosemide (LASIX) 20 MG tablet Take 20 mg by mouth Daily As Needed.     • gabapentin (NEURONTIN) 600 MG tablet Take 600 mg by mouth 3 (Three) Times a Day.     • hydrochlorothiazide (MICROZIDE) 12.5 MG capsule Take 12.5 mg by mouth Daily.     • loratadine (CLARITIN) 10 MG tablet Take 10 mg by mouth Daily.  1   • Multiple Vitamin (MULTI VITAMIN DAILY) tablet Take 1 tablet by mouth Daily.     • Multiple Vitamins-Minerals (HAIR SKIN AND NAILS FORMULA PO) Take 1 tablet by mouth Daily.     • NIFEdipine XL (PROCARDIA XL) 30 MG 24 hr tablet Take 30 mg by mouth 3 (Three) Times a Day.     • omeprazole (PriLOSEC) 20 MG capsule Take 20 mg by mouth 2 (Two) Times a Day.     • potassium chloride (MICRO-K) 10 MEQ CR capsule Take 1 tablet by mouth 3 (Three) Times a Day.     • PROAIR  (90 BASE) MCG/ACT inhaler Inhale 2 puffs Every 6 (Six) Hours As Needed.     • sennosides-docusate sodium (SENOKOT-S) 8.6-50 MG tablet Take 1 tablet by mouth As Needed for Constipation.     • traMADol (ULTRAM) 50 MG tablet Take 50 mg by mouth Every 6 (Six) Hours As Needed for Moderate Pain .       No current facility-administered medications for this visit.      Past Medical History:   Diagnosis Date   • Anxiety    • Arthritis    • Calcinosis-Raynaud's sclerodactyly-telangiectasia syndrome    • COPD (chronic obstructive pulmonary disease)    • Diastolic heart failure 12/9/2016    Echocardiogram, 08/10/2011: LVH, LVEF 70% and no significant valvular abnormality.  Pharmacologic MPS, 07/28/2011: normal, LVEF 80%. Recurrent chest pain/CHF presentations to the [facility]. Cardiac catheterization, 05/23/2012: Normal coronary arteries, LVEF 65%, LVEDP 32 mmHg. Renal angiography, 05/23/2012: normal bilateral renal arteries.  Echocardiogram for worsening dyspnea, 03/ • GERD (gastroesophageal  "reflux disease)    • Heart murmur    • Hiatal hernia    • High cholesterol    • History of recurrent UTIs    • History of staph infection     after hip replacement   • History of stomach ulcers    • Hypertension    • Low back pain    • Lumbar herniated disc     with right-sided sciatica:Status post lumbar fusion, 01/17/2013.   • Panic attacks    • SOB (shortness of breath)    • Wears dentures     upper denture and partial   • Wears eyeglasses      Past Surgical History:   Procedure Laterality Date   • APPENDECTOMY  1975   • CARDIAC SURGERY      cardiac cath   • CHOLECYSTECTOMY  1996   • EYE SURGERY      cataract surgery   • HIP SURGERY     • HYSTERECTOMY  1999   • JOINT REPLACEMENT      hip left   • LUMBAR LAMINECTOMY WITH FUSION N/A 3/17/2017    Procedure: LUMBAR FUSION DECOMPRESSON WITH PEDICLE SCREWS L3-4 ;  Surgeon: Sen Skinner MD;  Location: [facility];  Service:    • OTHER SURGICAL HISTORY      arthrodesis lumbar   • OTHER SURGICAL HISTORY  1982    neuroplasty decompression median nerve at carpal tunnel   • TONSILLECTOMY  1963   • TUBAL ABDOMINAL LIGATION  1975       The following portions of the patient's history were reviewed and updated as appropriate: allergies, current medications, past family history, past medical history, past social history, past surgical history and problem list.    Review of Systems  General: negative  Integumentary: negative  Eyes: glasses  ENT: negative  Respiratory: shortness of breath  Gastrointestinal: negative  Cardiovascular: rapid heart rate  Neurological: negative  Psychiatric: anxiety and insomnia  Hematologic/Lymphatic: easy bruising  Genitourinary: negative  Musculoskeletal: painful joints, sore muscles, back pain and joint stiffness  Endocrine: negative  Breasts: nipple inversion    Objective   /78 (BP Location: Left arm, Patient Position: Sitting)  Pulse 92  Ht 64\" (162.6 cm)  Wt 126 lb 6.4 oz (57.3 kg)  BMI 21.7 kg/m2   Physical Exam  General:  This is a WD " WN thin white female in no acute distress  HEENT exam:  WNL. Sclera are anicteric.  EOMI  Neck:  supple, FROM, without thyromegaly, cervical or supraclavicular adenopathy  Lungs:  Respiratory effort normal. Auscultation: Clear, without wheezes, rhonchi, rales  Heart:  Regular rate and rhythm, without murmur, gallop, rub.  No pedal edema  Breasts: On visual inspection the breasts are symmetrical. Examination of the right breast demonstrates no discrete mass, skin change, or axillary adenopathy.  Examination of the left breast demonstrates focal nodularity in the 2 o'clock position at the edge of the breast tissue.  This was not definitely present on prior examination.    Abdomen: Nontender, without mass or hepatosplenomegaly.  Musculoskeletal:  muscle strength/tone is normal.  Gait and station: normal. No digital cyanosis  Psyc:  alert, oriented x 3.  Mood and affect are appropriate  skin:  Warm with good turgor.  Without rash or lesion  extremities:  Examination of the extremities revealed no cyanosis, clubbing or edema.  Results/Data  Current and previous mammogram images were reviewed.  Although the mammogram has not yet been finalized on my review I do not appreciate any significant change in the pattern of extensive dystrophic calcification.    Procedures   Limited Left Breast Ultrasound    Indication:  Palpable abnormality left breast upper outer quadrant    Description:  With use of the 12.5 MHz transducer the area of clinical concern was scanned in multiple planes.    Findings:  In the area of clinical concern, corresponding to the clinical abnormality there is focal fibrocystic tissue.  No discrete solid or cystic mass is identified.    Impression:  Palpable abnormality in the upper outer left breast consistent with fibrocystic tissue.    Recommendation:  Followup as clinically indicated    Assessment/Plan   History of abnormal ultrasound of the left breast.  Bilateral breast calcifications     Plan:  Track  mammogram result.  Bilateral mammogram in one years time with return to the office following           Discussion/Summary    Errors in dictation may reflect use of voice recognition software and not all errors in transcription may have been detected prior to signing.    Future Appointments  Date Time Provider Department Center   10/2/2017 1:00 PM MD DIONE Mayfield [facility] None   1/26/2018 1:15 PM MD DIONE Velasco [facility] None

## 2017-10-02 ENCOUNTER — OFFICE VISIT (OUTPATIENT)
Dept: NEUROSURGERY | Facility: CLINIC | Age: 77
End: 2017-10-02

## 2017-10-02 DIAGNOSIS — Z98.1 STATUS POST LUMBAR SPINAL FUSION: Primary | ICD-10-CM

## 2017-10-02 DIAGNOSIS — M25.551 RIGHT HIP PAIN: ICD-10-CM

## 2017-10-02 PROCEDURE — 99213 OFFICE O/P EST LOW 20 MIN: CPT | Performed by: NEUROLOGICAL SURGERY

## 2017-10-02 NOTE — PROGRESS NOTES
Subjective   Lisa Calloway is a 77 y.o. female who presents for follow up of  right hip and leg pain.     The patient had a PLIF at L3-4 6-1/2 months ago.  I saw her in June.  She was doing reasonably well and making good progress.  About 6 weeks ago she was lifting an ice drawer and developed severe pain in her right hip and leg afterwards, and has had trouble with standing and walking even short distances.  The pain radiates from her right hip across the right groin to the medial thigh as far as the knee.  She has to use a cane for ambulation now.    Lumbar x-rays were performed on 9/22/17, and the findings were perfectly normal for postoperative appearance of a PLIF at L3-4 with pedicle screws and prior interbody PLIF at L4 5.    The following portions of the patient's history were reviewed, updated as appropriate and approved: allergies, current medications, past family history, past medical history, past social history, past surgical history, review of systems and problem list.     Review of Systems   Constitutional: Negative for activity change, appetite change, chills, diaphoresis, fatigue, fever and unexpected weight change.   HENT: Negative for congestion, dental problem, drooling, ear discharge, ear pain, facial swelling, hearing loss, mouth sores, nosebleeds, postnasal drip, rhinorrhea, sinus pressure, sneezing, sore throat, tinnitus, trouble swallowing and voice change.    Eyes: Negative for photophobia, pain, discharge, redness, itching and visual disturbance.   Respiratory: Negative for apnea, cough, choking, chest tightness, shortness of breath, wheezing and stridor.    Cardiovascular: Negative for chest pain, palpitations and leg swelling.   Gastrointestinal: Negative for abdominal distention, abdominal pain, anal bleeding, blood in stool, constipation, diarrhea, nausea, rectal pain and vomiting.   Endocrine: Negative for cold intolerance, heat intolerance, polydipsia, polyphagia and polyuria.    Genitourinary: Negative for decreased urine volume, difficulty urinating, dysuria, enuresis, flank pain, frequency, genital sores, hematuria and urgency.   Musculoskeletal: Positive for back pain. Negative for arthralgias, gait problem, joint swelling, myalgias, neck pain and neck stiffness.   Skin: Negative for color change, pallor, rash and wound.   Allergic/Immunologic: Negative for environmental allergies, food allergies and immunocompromised state.   Neurological: Positive for numbness. Negative for dizziness, tremors, seizures, syncope, facial asymmetry, speech difficulty, weakness, light-headedness and headaches.   Hematological: Negative for adenopathy. Does not bruise/bleed easily.   Psychiatric/Behavioral: Negative for agitation, behavioral problems, confusion, decreased concentration, dysphoric mood, hallucinations, self-injury, sleep disturbance and suicidal ideas. The patient is not nervous/anxious and is not hyperactive.        Objective    NEUROLOGICAL EXAMINATION:    SLR negative.  She has trouble raising her right foot to put on her shoes by flexing her hip.  Hip rotation both internal and external is severely limited.  Knee extension and ankle dorsiflexion and plantar flexion are intact.  Ankle and knee jerks are trace bilaterally.    Assessment   Right hip and leg pain, with clinical findings strongly suggestive of right hip disease as the primary cause of pain, not a lumbar radiculopathy.       Plan   Referral to orthopedics for right hip evaluation.  She has had a left hip replacement by Dr. Fisher, and it would be appropriate to refer her again for this problem.       Sen Skinner MD

## 2017-10-31 DIAGNOSIS — M25.551 HIP PAIN, RIGHT: Primary | ICD-10-CM

## 2017-11-01 ENCOUNTER — OFFICE VISIT (OUTPATIENT)
Dept: ORTHOPEDIC SURGERY | Facility: CLINIC | Age: 77
End: 2017-11-01

## 2017-11-01 ENCOUNTER — HOSPITAL ENCOUNTER (OUTPATIENT)
Dept: GENERAL RADIOLOGY | Facility: HOSPITAL | Age: 77
Discharge: HOME | End: 2017-11-01
Attending: ORTHOPAEDIC SURGERY | Admitting: ORTHOPAEDIC SURGERY

## 2017-11-01 VITALS
BODY MASS INDEX: 21.34 KG/M2 | DIASTOLIC BLOOD PRESSURE: 82 MMHG | SYSTOLIC BLOOD PRESSURE: 164 MMHG | HEART RATE: 88 BPM | HEIGHT: 64 IN | WEIGHT: 125 LBS

## 2017-11-01 DIAGNOSIS — M25.551 HIP PAIN, RIGHT: ICD-10-CM

## 2017-11-01 DIAGNOSIS — M16.11 PRIMARY OSTEOARTHRITIS OF RIGHT HIP: Primary | ICD-10-CM

## 2017-11-01 PROCEDURE — 73523 X-RAY EXAM HIPS BI 5/> VIEWS: CPT | Performed by: RADIOLOGY

## 2017-11-01 PROCEDURE — 99213 OFFICE O/P EST LOW 20 MIN: CPT | Performed by: ORTHOPAEDIC SURGERY

## 2017-11-01 PROCEDURE — 73521 X-RAY EXAM HIPS BI 2 VIEWS: CPT

## 2017-11-01 RX ORDER — FLUCONAZOLE 150 MG/1
TABLET ORAL
COMMUNITY
Start: 2017-10-04 | End: 2018-01-26

## 2017-11-01 NOTE — PROGRESS NOTES
Patient: Lisa Calloway  YOB: 1940  Date of Encounter: 11/01/2017        History of Present Illness:     Lisa Calloway is a 77 y.o. female last seen 10/19/16.  Patient presents today with new complaint right hip pain.  She reports that this began back in August is a known injury onset pain.  She initially felt that this may have been from prolonged walking due to a class reunion however she cannot confirm this.  She has recently undergone her second back surgery via Dr. Skinner and states that once her back became well she began to notice for right hip pain.  She reports pain to the anterior aspect of her hip radiating down to the lateral aspect of her thigh stopping at the knee.  She has significant stiffness with inability to fully flex her hip.  She has difficulty with daily tasks such as putting her shoes on as well as bending forward.  Patient has undergone no conservative treatment in reference to the right hip.  She presents today for x-rays for further evaluation.    Social History     Occupational History   • Not on file.     Social History Main Topics   • Smoking status: Never Smoker   • Smokeless tobacco: Never Used   • Alcohol use No   • Drug use: No   • Sexual activity: Defer       Physical Exam: 77 y.o. female .  General Appearance:    Well appearing, cooperative, in no acute distress.       Patient is alert and oriented x 3.          Musculoskeletal: Right hip on examination while lying reveals that she can achieve passive flexion to approximately 85-90° of the hip with 5° arc of rotation.  She has notable crepitus upon attempted range of motion.  Patient has no significant swelling, ecchymosis or erythema to the affected hip.  She has lost approximate one half an inch leg length.  The contralateral leg.  Right lower extremity grossly neurovascular intact.  Straight leg test negative      Radiology:       XR: Advanced right hip osteoarthritis with loss of sphericity of the femoral head  and obliteration of the femoral stable joint space.         Assessment:    ICD-10-CM ICD-9-CM   1. Primary osteoarthritis of right hip M16.11 715.15       Plan:    Situation was further discussed with the patient and at this point she would like to forego any surgical intervention as a method to confirm the pain being from the right hip versus the back.  Patient will undergo a fluoroscopy guided Depo-Medrol injection into the right hip.  Patient will return back upon completion of this or further evaluation.  Patient was informed that eventually she will require total joint arthroplasty for alleviation of her pain secondary to the advanced nature of the osteoarthritis however the patient wishes not to proceed with surgery at this point.    Written by Sumit Barker PA-C, acting as scribe for Oswaldo Fisher M.D.    CC: Tobin Acosta MD

## 2017-11-06 ENCOUNTER — TELEPHONE (OUTPATIENT)
Dept: ORTHOPEDIC SURGERY | Facility: CLINIC | Age: 77
End: 2017-11-06

## 2017-11-06 NOTE — TELEPHONE ENCOUNTER
Patient has been notified with Rt. Hip injection appointment 11/16/17@2:00. Patient states that she is taking aspirin and celebrex and will confirm with her physician that she can stop taking these on 11/11/17.

## 2017-11-16 ENCOUNTER — TELEPHONE (OUTPATIENT)
Dept: ORTHOPEDIC SURGERY | Facility: CLINIC | Age: 77
End: 2017-11-16

## 2017-11-16 ENCOUNTER — HOSPITAL ENCOUNTER (OUTPATIENT)
Dept: GENERAL RADIOLOGY | Facility: HOSPITAL | Age: 77
End: 2017-11-16
Attending: ORTHOPAEDIC SURGERY

## 2017-11-16 NOTE — TELEPHONE ENCOUNTER
Patient is scheduled for intra-articular injection rt.hip 11/21/17@2:00. Due to the patient being allergic to contrast dye  Prescriptions for prednisone 30 mg po q 8 hrsx3 in 24 hrs, and Benadryl 25mg.po q 12hx2 in 24 hrs.Rose called prescriptions to [facility] and notified patient. Patient verbalized understanding.

## 2017-11-21 ENCOUNTER — HOSPITAL ENCOUNTER (OUTPATIENT)
Dept: GENERAL RADIOLOGY | Facility: HOSPITAL | Age: 77
Discharge: HOME | End: 2017-11-21
Attending: ORTHOPAEDIC SURGERY | Admitting: PHYSICIAN ASSISTANT

## 2017-11-21 VITALS
RESPIRATION RATE: 16 BRPM | BODY MASS INDEX: 24.54 KG/M2 | WEIGHT: 125 LBS | TEMPERATURE: 98.4 F | HEART RATE: 90 BPM | OXYGEN SATURATION: 97 % | SYSTOLIC BLOOD PRESSURE: 170 MMHG | HEIGHT: 60 IN | DIASTOLIC BLOOD PRESSURE: 69 MMHG

## 2017-11-21 DIAGNOSIS — M16.11 PRIMARY OSTEOARTHRITIS OF RIGHT HIP: ICD-10-CM

## 2017-11-21 PROCEDURE — 20610 DRAIN/INJ JOINT/BURSA W/O US: CPT | Performed by: RADIOLOGY

## 2017-11-21 PROCEDURE — 25010000002 TRIAMCINOLONE PER 10 MG: Performed by: ORTHOPAEDIC SURGERY

## 2017-11-21 PROCEDURE — 0 IOPAMIDOL 61 % SOLUTION: Performed by: ORTHOPAEDIC SURGERY

## 2017-11-21 PROCEDURE — 77002 NEEDLE LOCALIZATION BY XRAY: CPT

## 2017-11-21 PROCEDURE — 77002 NEEDLE LOCALIZATION BY XRAY: CPT | Performed by: RADIOLOGY

## 2017-11-21 RX ORDER — TRIAMCINOLONE ACETONIDE 40 MG/ML
80 INJECTION, SUSPENSION INTRA-ARTICULAR; INTRAMUSCULAR
Status: COMPLETED | OUTPATIENT
Start: 2017-11-21 | End: 2017-11-21

## 2017-11-21 RX ORDER — LIDOCAINE HYDROCHLORIDE 20 MG/ML
10 INJECTION, SOLUTION INFILTRATION; PERINEURAL
Status: COMPLETED | OUTPATIENT
Start: 2017-11-21 | End: 2017-11-21

## 2017-11-21 RX ORDER — BUPIVACAINE HYDROCHLORIDE 5 MG/ML
6 INJECTION, SOLUTION PERINEURAL
Status: COMPLETED | OUTPATIENT
Start: 2017-11-21 | End: 2017-11-21

## 2017-11-21 RX ADMIN — LIDOCAINE HYDROCHLORIDE 10 ML: 20 INJECTION, SOLUTION INFILTRATION; PERINEURAL at 14:30

## 2017-11-21 RX ADMIN — IOPAMIDOL 5 ML: 612 INJECTION, SOLUTION INTRAVENOUS at 14:30

## 2017-11-21 RX ADMIN — TRIAMCINOLONE ACETONIDE 80 MG: 40 INJECTION, SUSPENSION INTRA-ARTICULAR; INTRAMUSCULAR at 14:30

## 2017-11-21 RX ADMIN — BUPIVACAINE HYDROCHLORIDE 6 ML: 5 INJECTION, SOLUTION PERINEURAL at 14:30

## 2017-11-21 NOTE — NURSING NOTE
Procedure ended. Pt. Tolerated well. No bleeding noted. Sterile dsg applied to site. See  Dr. Flores's report for injection..

## 2017-12-13 ENCOUNTER — OFFICE VISIT (OUTPATIENT)
Dept: ORTHOPEDIC SURGERY | Facility: CLINIC | Age: 77
End: 2017-12-13

## 2017-12-13 VITALS
TEMPERATURE: 98.2 F | BODY MASS INDEX: 24.54 KG/M2 | SYSTOLIC BLOOD PRESSURE: 163 MMHG | DIASTOLIC BLOOD PRESSURE: 74 MMHG | WEIGHT: 125 LBS | HEIGHT: 60 IN | OXYGEN SATURATION: 96 % | HEART RATE: 98 BPM

## 2017-12-13 DIAGNOSIS — M16.11 PRIMARY OSTEOARTHRITIS OF RIGHT HIP: Primary | ICD-10-CM

## 2017-12-13 PROCEDURE — 99214 OFFICE O/P EST MOD 30 MIN: CPT | Performed by: ORTHOPAEDIC SURGERY

## 2017-12-13 NOTE — PROGRESS NOTES
History and Physical    Patient: Lisa Calloway  YOB: 1940  Date of encounter: 12/13/2017      History of Present Illness:   Lisa Calloway is a 77 y.o. female who returns here today for follow-up of osteoarthritis of the right hip.  Since her last office visit she received intra-articular steroid injection to the right hip.  She states it has eased her pain but is only helped for a few weeks.  She states the groin pain is beginning to return.  She continues to have difficulty with weightbearing activities as well as going up and down stairs.  She continues to walk with a limp.      PMH:   Patient Active Problem List   Diagnosis   • Abnormal mammogram   • Essential hypertension   • Osteoarthritis (arthritis due to wear and tear of joints)   • Raynaud disease   • Anxiety   • COPD (chronic obstructive pulmonary disease)   • GERD (gastroesophageal reflux disease)   • Hip pain   • Chronic diastolic heart failure   • Palpitations   • Polypharmacy   • Spondylolisthesis, lumbar region   • Spinal stenosis, lumbar region, with neurogenic claudication   • Spondylolisthesis, acquired   • Acquired spondylolisthesis       PSH:  Past Surgical History:   Procedure Laterality Date   • APPENDECTOMY  1975   • CARDIAC SURGERY      cardiac cath   • CHOLECYSTECTOMY  1996   • EYE SURGERY      cataract surgery   • HIP SURGERY     • HYSTERECTOMY  1999   • JOINT REPLACEMENT      hip left   • LUMBAR LAMINECTOMY WITH FUSION N/A 3/17/2017    Procedure: LUMBAR FUSION DECOMPRESSON WITH PEDICLE SCREWS L3-4 ;  Surgeon: Sen Skinner MD;  Location: [facility];  Service:    • OTHER SURGICAL HISTORY      arthrodesis lumbar   • OTHER SURGICAL HISTORY  1982    neuroplasty decompression median nerve at carpal tunnel   • TONSILLECTOMY  1963   • TUBAL ABDOMINAL LIGATION  1975       Allergies:     Allergies   Allergen Reactions   • Amlodipine GI Intolerance   • Beta Adrenergic Blockers    • Ergotamine-Caffeine    • Lisinopril Other (See  Comments)     hypotension   • Metoprolol GI Intolerance   • Other    • Tenormin [Atenolol] GI Intolerance   • Trovan [Trovafloxacin]    • Atorvastatin Myalgia   • Cefprozil Rash   • Codeine GI Intolerance   • Contrast Dye Swelling   • Coreg [Carvedilol] GI Intolerance   • Fish-Derived Products Rash   • Penicillins Hives   • Sectral [Acebutolol Hcl] Rash   • Sulfa Antibiotics Rash   • Tetracycline Rash       Medications:     Current Outpatient Prescriptions:   •  amitriptyline (ELAVIL) 10 MG tablet, Take 20 mg by mouth At Night As Needed., Disp: , Rfl:   •  aspirin 81 MG tablet, Take 1 tablet by mouth daily., Disp: , Rfl:   •  baclofen (LIORESAL) 10 MG tablet, Take 1 tablet by mouth 3 (Three) Times a Day., Disp: , Rfl:   •  busPIRone (BUSPAR) 10 MG tablet, Take 10 mg by mouth 4 (Four) Times a Day As Needed., Disp: , Rfl: 4  •  Calcium-Phosphorus-Vitamin D (CITRACAL +D3 PO), Take 1 tablet by mouth Daily., Disp: , Rfl:   •  celecoxib (CELEBREX) 200 MG capsule, Take 200 mg by mouth 2 (Two) Times a Day., Disp: , Rfl:   •  fluconazole (DIFLUCAN) 150 MG tablet, , Disp: , Rfl:   •  fluticasone (FLONASE) 50 MCG/ACT nasal spray, 1 spray into each nostril Daily., Disp: , Rfl: 1  •  furosemide (LASIX) 20 MG tablet, Take 20 mg by mouth Daily As Needed., Disp: , Rfl:   •  gabapentin (NEURONTIN) 600 MG tablet, Take 600 mg by mouth 3 (Three) Times a Day., Disp: , Rfl:   •  hydrochlorothiazide (MICROZIDE) 12.5 MG capsule, Take 12.5 mg by mouth Daily., Disp: , Rfl:   •  loratadine (CLARITIN) 10 MG tablet, Take 10 mg by mouth Daily., Disp: , Rfl: 1  •  Multiple Vitamin (MULTI VITAMIN DAILY) tablet, Take 1 tablet by mouth Daily., Disp: , Rfl:   •  Multiple Vitamins-Minerals (HAIR SKIN AND NAILS FORMULA PO), Take 1 tablet by mouth Daily., Disp: , Rfl:   •  NIFEdipine XL (PROCARDIA XL) 30 MG 24 hr tablet, Take 30 mg by mouth 3 (Three) Times a Day., Disp: , Rfl:   •  omeprazole (PriLOSEC) 20 MG capsule, Take 20 mg by mouth 2 (Two) Times a  "Day., Disp: , Rfl:   •  potassium chloride (MICRO-K) 10 MEQ CR capsule, Take 1 tablet by mouth 3 (Three) Times a Day., Disp: , Rfl:   •  PROAIR  (90 BASE) MCG/ACT inhaler, Inhale 2 puffs Every 6 (Six) Hours As Needed., Disp: , Rfl:   •  sennosides-docusate sodium (SENOKOT-S) 8.6-50 MG tablet, Take 1 tablet by mouth As Needed for Constipation., Disp: , Rfl:   •  traMADol (ULTRAM) 50 MG tablet, Take 50 mg by mouth Every 6 (Six) Hours As Needed for Moderate Pain ., Disp: , Rfl:     Social History:     Social History     Occupational History   • Not on file.     Social History Main Topics   • Smoking status: Never Smoker   • Smokeless tobacco: Never Used   • Alcohol use No   • Drug use: No   • Sexual activity: Defer      Social History     Social History Narrative       Family History:     Family History   Problem Relation Age of Onset   • Congenital heart disease Mother    • Hypertension Mother    • Stroke Mother    • Cancer Other    • Gout Other        Review of Systems:   Review of Systems   Constitutional: Positive for activity change and fatigue.   HENT: Positive for mouth sores and rhinorrhea.    Eyes: Negative.    Respiratory: Positive for shortness of breath.    Cardiovascular: Negative.    Gastrointestinal: Negative.    Endocrine: Positive for heat intolerance.   Genitourinary: Negative.    Musculoskeletal:        Pertinent positives mentioned in HPI   Skin: Negative.    Neurological: Negative.    Hematological: Negative.    Psychiatric/Behavioral: Positive for sleep disturbance. The patient is nervous/anxious.        Physical Exam:   General Appearance:    77 y.o. female  cooperative, in no acute distress.  Alert and oriented x 3,                   Vitals:    12/13/17 1351   BP: 163/74   BP Location: Left arm   Patient Position: Sitting   Pulse: 98   Temp: 98.2 °F (36.8 °C)   SpO2: 96%   Weight: 56.7 kg (125 lb)   Height: 152.4 cm (60\")          HEENT: Unremarkable      Neck: Supple without " lymphadenopathy.      Chest: Clear to ascultation bilaterally. Normal respiratory effort.      Heart: Regular rate and rhythm. No murmurs noted.      Skin:  Warm and dry without any rash.      Musculoskeletal:Examination of the right hip reveals flexion to approximately 90° with 5° arc of rotation.  She has notable crepitus upon attempted range of motion.  She has negative straight leg raise.  She has approximately half inch shortening on the right leg.  Her neurovascular status is intact.       Radiology:     AP and lateral views of the right hip were reviewed revealing advanced osteoarthritic changes with obliteration of the joint.    Assessment    ICD-10-CM ICD-9-CM   1. Primary osteoarthritis of right hip M16.11 715.15       Plan:   A 77-year-old female with advanced osteoarthritis of the right hip.  Previous intra-articular injection only improved symptoms for a few weeks.  Pain is returning continuing to get progressively worse.  She's having difficulties with activities of daily living because of the pain.  Previous x-rays done in  November reveal advanced osteoarthritic changes of the right hip.  Today we have discussed proceeding with a total hip arthroplasty including the risk, benefits, and future outcomes of surgery.  She accepts these risks and is agreeable to surgery.  She will require clearance prior to surgery.  Once clearance for surgery has been obtained we will schedule her at [facility].    Written by, Kaley BATES, acting as a scribe for Dr. Phillip Acosta MD

## 2017-12-15 ENCOUNTER — TELEPHONE (OUTPATIENT)
Dept: CARDIOLOGY | Facility: CLINIC | Age: 77
End: 2017-12-15

## 2017-12-15 NOTE — TELEPHONE ENCOUNTER
Dr. Fisher's office called for a pre-op clearance for a rt total hip.  Last seen 7/21/17  Next fu 1/26/18

## 2017-12-19 ENCOUNTER — TELEPHONE (OUTPATIENT)
Dept: ORTHOPEDIC SURGERY | Facility: CLINIC | Age: 77
End: 2017-12-19

## 2017-12-19 NOTE — TELEPHONE ENCOUNTER
Patient called stated Dr. Acosta her PCP, is sending her to see his partner for medical clearance.  Appointment is 1-2-18.    We are waiting on Dr. Lam to send clearance, she has an up coming appointment with him 1-26-18.  If he needs to see her before clearing her for surgery. we will request a sooner appointment

## 2018-01-22 ENCOUNTER — OFFICE VISIT (OUTPATIENT)
Dept: ORTHOPEDIC SURGERY | Facility: CLINIC | Age: 78
End: 2018-01-22

## 2018-01-22 VITALS
HEIGHT: 60 IN | SYSTOLIC BLOOD PRESSURE: 169 MMHG | DIASTOLIC BLOOD PRESSURE: 67 MMHG | HEART RATE: 97 BPM | WEIGHT: 125 LBS | BODY MASS INDEX: 24.54 KG/M2

## 2018-01-22 DIAGNOSIS — M16.11 PRIMARY OSTEOARTHRITIS OF RIGHT HIP: Primary | ICD-10-CM

## 2018-01-22 DIAGNOSIS — M25.551 RIGHT HIP PAIN: ICD-10-CM

## 2018-01-22 DIAGNOSIS — Z22.322 CARRIER OR SUSPECTED CARRIER OF METHICILLIN RESISTANT STAPHYLOCOCCUS AUREUS (CODE): ICD-10-CM

## 2018-01-22 DIAGNOSIS — Z01.818 PRE-OP TESTING: ICD-10-CM

## 2018-01-22 PROCEDURE — 99214 OFFICE O/P EST MOD 30 MIN: CPT | Performed by: ORTHOPAEDIC SURGERY

## 2018-01-22 NOTE — PROGRESS NOTES
History and Physical      Patient: Lisa Calloway  YOB: 1940  Date of Encounter: 01/22/2018      HPI:    Lisa Calloway is a 77-year-old white female presents today for follow-up on going advanced worsening right hip osteoarthritis.  Patient has previously undergone intra-articular steroid injection to the right hip that is minimally decreased her pain.  She continues to describe worsening debilitating right hip and groin pain.  She has difficult with activities of daily living and weight-bearing.  She continues to have an antalgic gait and uses a cane for aid with ambulation.  Patient has received cardiac clearance and wishes to proceed with right total hip arthroplasty.    Past Medical History:   Diagnosis Date   • Anxiety    • Arthritis    • Calcinosis-Raynaud's sclerodactyly-telangiectasia syndrome    • COPD (chronic obstructive pulmonary disease)    • Diastolic heart failure 12/9/2016    Echocardiogram, 08/10/2011: LVH, LVEF 70% and no significant valvular abnormality.  Pharmacologic MPS, 07/28/2011: normal, LVEF 80%. Recurrent chest pain/CHF presentations to the [place], [place] area. Cardiac catheterization, 05/23/2012: Normal coronary arteries, LVEF 65%, LVEDP 32 mmHg. Renal angiography, 05/23/2012: normal bilateral renal arteries.  Echocardiogram for worsening dyspnea, 03/ • GERD (gastroesophageal reflux disease)    • Heart murmur    • Hiatal hernia    • High cholesterol    • History of recurrent UTIs    • History of staph infection     after hip replacement   • History of stomach ulcers    • Hypertension    • Low back pain    • Lumbar herniated disc     with right-sided sciatica:Status post lumbar fusion, 01/17/2013.   • Panic attacks    • SOB (shortness of breath)    • Wears dentures     upper denture and partial   • Wears eyeglasses      Patient Active Problem List   Diagnosis   • Abnormal mammogram   • Essential hypertension   • Osteoarthritis (arthritis due to wear and tear of  joints)   • Raynaud disease   • Anxiety   • COPD (chronic obstructive pulmonary disease)   • GERD (gastroesophageal reflux disease)   • Hip pain   • Chronic diastolic heart failure   • Palpitations   • Polypharmacy   • Spondylolisthesis, lumbar region   • Spinal stenosis, lumbar region, with neurogenic claudication   • Spondylolisthesis, acquired   • Acquired spondylolisthesis   • Right hip pain         Past Surgical History:   Procedure Laterality Date   • APPENDECTOMY  1975   • CARDIAC SURGERY      cardiac cath   • CHOLECYSTECTOMY  1996   • EYE SURGERY      cataract surgery   • HIP SURGERY     • HYSTERECTOMY  1999   • JOINT REPLACEMENT      hip left   • LUMBAR LAMINECTOMY WITH FUSION N/A 3/17/2017    Procedure: LUMBAR FUSION DECOMPRESSON WITH PEDICLE SCREWS L3-4 ;  Surgeon: Sen Skinner MD;  Location: [facility];  Service:    • OTHER SURGICAL HISTORY      arthrodesis lumbar   • OTHER SURGICAL HISTORY  1982    neuroplasty decompression median nerve at carpal tunnel   • TONSILLECTOMY  1963   • TUBAL ABDOMINAL LIGATION  1975       Family History   Problem Relation Age of Onset   • Congenital heart disease Mother    • Hypertension Mother    • Stroke Mother    • Cancer Other    • Gout Other        Social History     Social History   • Marital status:      Spouse name: N/A   • Number of children: N/A   • Years of education: N/A     Occupational History   • Not on file.     Social History Main Topics   • Smoking status: Never Smoker   • Smokeless tobacco: Never Used   • Alcohol use No   • Drug use: No   • Sexual activity: Defer     Other Topics Concern   • Not on file     Social History Narrative       Current Outpatient Prescriptions   Medication Sig Dispense Refill   • amitriptyline (ELAVIL) 10 MG tablet Take 20 mg by mouth At Night As Needed.     • aspirin 81 MG tablet Take 1 tablet by mouth daily.     • baclofen (LIORESAL) 10 MG tablet Take 1 tablet by mouth 3 (Three) Times a Day.     • busPIRone (BUSPAR) 10  MG tablet Take 10 mg by mouth 4 (Four) Times a Day As Needed.  4   • Calcium-Phosphorus-Vitamin D (CITRACAL +D3 PO) Take 1 tablet by mouth Daily.     • celecoxib (CELEBREX) 200 MG capsule Take 200 mg by mouth 2 (Two) Times a Day.     • fluconazole (DIFLUCAN) 150 MG tablet      • fluticasone (FLONASE) 50 MCG/ACT nasal spray 1 spray into each nostril Daily.  1   • furosemide (LASIX) 20 MG tablet Take 20 mg by mouth Daily As Needed.     • gabapentin (NEURONTIN) 600 MG tablet Take 600 mg by mouth 3 (Three) Times a Day.     • hydrochlorothiazide (MICROZIDE) 12.5 MG capsule Take 12.5 mg by mouth Daily.     • loratadine (CLARITIN) 10 MG tablet Take 10 mg by mouth Daily.  1   • Multiple Vitamin (MULTI VITAMIN DAILY) tablet Take 1 tablet by mouth Daily.     • Multiple Vitamins-Minerals (HAIR SKIN AND NAILS FORMULA PO) Take 1 tablet by mouth Daily.     • NIFEdipine XL (PROCARDIA XL) 30 MG 24 hr tablet Take 30 mg by mouth 3 (Three) Times a Day.     • omeprazole (PriLOSEC) 20 MG capsule Take 20 mg by mouth 2 (Two) Times a Day.     • potassium chloride (MICRO-K) 10 MEQ CR capsule Take 1 tablet by mouth 3 (Three) Times a Day.     • PROAIR  (90 BASE) MCG/ACT inhaler Inhale 2 puffs Every 6 (Six) Hours As Needed.     • sennosides-docusate sodium (SENOKOT-S) 8.6-50 MG tablet Take 1 tablet by mouth As Needed for Constipation.     • traMADol (ULTRAM) 50 MG tablet Take 50 mg by mouth Every 6 (Six) Hours As Needed for Moderate Pain .       No current facility-administered medications for this visit.        Allergies   Allergen Reactions   • Amlodipine GI Intolerance   • Beta Adrenergic Blockers    • Ergotamine-Caffeine    • Lisinopril Other (See Comments)     hypotension   • Metoprolol GI Intolerance   • Other    • Tenormin [Atenolol] GI Intolerance   • Trovan [Trovafloxacin]    • Atorvastatin Myalgia   • Cefprozil Rash   • Codeine GI Intolerance   • Contrast Dye Swelling   • Coreg [Carvedilol] GI Intolerance   • Fish-Derived  "Products Rash   • Penicillins Hives   • Sectral [Acebutolol Hcl] Rash   • Sulfa Antibiotics Rash   • Tetracycline Rash       Review of Systems   Constitution: Positive for malaise/fatigue.        Activity change   HENT: Positive for congestion.         Mouth sores from dentures   Eyes: Negative.    Cardiovascular: Positive for leg swelling and palpitations.   Respiratory: Positive for shortness of breath.    Endocrine: Positive for heat intolerance.   Hematologic/Lymphatic: Bruises/bleeds easily.   Skin: Negative.    Musculoskeletal: Positive for back pain and joint pain.        Pertinent positives listed in HPI   Gastrointestinal: Positive for bloating.   Genitourinary: Positive for frequency.   Neurological: Positive for focal weakness.   Psychiatric/Behavioral: The patient has insomnia and is nervous/anxious.    Allergic/Immunologic: Negative.          Vitals:    01/22/18 0956   BP: 169/67   BP Location: Right arm   Patient Position: Sitting   Cuff Size: Adult   Pulse: 97   Weight: 56.7 kg (125 lb)   Height: 152.4 cm (60\")             Physical Exam   Constitutional: She is oriented to person, place, and time. She appears well-developed and well-nourished. No distress.   HENT:   Head: Normocephalic and atraumatic.   Nose: Nose normal.   Mouth/Throat: Oropharynx is clear and moist.   Eyes: Conjunctivae and EOM are normal. Pupils are equal, round, and reactive to light. No scleral icterus.   Neck: Normal range of motion. Neck supple. No JVD present. No tracheal deviation present. No thyromegaly present.   Cardiovascular: Normal rate, regular rhythm and intact distal pulses.  Exam reveals no gallop and no friction rub.    Murmur heard.  Pulmonary/Chest: Effort normal and breath sounds normal. No respiratory distress. She has no wheezes. She has no rales.   Abdominal: Soft. Bowel sounds are normal.   Musculoskeletal:   Examination of patient's right hip today reveals flexion at 90° with 5° arc of rotation.  Notable " crepitus with range of motion and negative straight leg raise.  Approximate half inch shortening right leg compared to the left.  Neurovascular status remains grossly intact   Lymphadenopathy:     She has no cervical adenopathy.   Neurological: She is alert and oriented to person, place, and time.   Skin: Skin is warm and dry. No erythema.   Psychiatric: She has a normal mood and affect.   Nursing note and vitals reviewed.      Radiology/Labs:       AP and lateral views right hip as well as an AP view of the pelvis reveals advanced osteoarthritic changes of the right hip with obliteration of the femoral acetabular joint space.    Procedures          Assessment:    ICD-10-CM ICD-9-CM   1. Right hip pain M25.551 719.45         Plan:        We will plan on proceeding with surgery at patient's request for Right Total Hip Arthroplasty 2/20/18.  I reviewed details of procedure with patient today and discussed risks, benefits, alternatives, and limitations of the procedure in laymen's terms with the risks including but not limited to:  neurovascular damage, bleeding, infection, chronic pain, worsening of pain, swelling, loss of motion, weakness, stiffness, instability, DVT, pulmonary embolus, loss of life/limb, and potential need for additional procedures.  No guarantees were given regarding results of surgery.     Patient was given the opportunity to ask and have all questions answered today.  The patient voiced understanding of the risks, benefits, and recalcitrance from conservative forms of treatment that were discussed and the patient consents to proceed with Right Total Hip Arthroplasty      Discussion/Summary:    Written by Sumit Barker PA-C, acting as scribe for Dr. Fisher

## 2018-01-24 ENCOUNTER — TELEPHONE (OUTPATIENT)
Dept: ORTHOPEDIC SURGERY | Facility: CLINIC | Age: 78
End: 2018-01-24

## 2018-01-24 ENCOUNTER — PREP FOR SURGERY (OUTPATIENT)
Dept: OTHER | Facility: HOSPITAL | Age: 78
End: 2018-01-24

## 2018-01-24 PROBLEM — Z01.818 PRE-OP TESTING: Status: ACTIVE | Noted: 2018-01-24

## 2018-01-24 RX ORDER — SODIUM CHLORIDE 0.9 % (FLUSH) 0.9 %
1-10 SYRINGE (ML) INJECTION AS NEEDED
Status: CANCELLED | OUTPATIENT
Start: 2018-01-24

## 2018-01-24 NOTE — H&P
History and Physical   Patient: Lisa Calloway   YOB: 1940   Date of Encounter: 01/22/2018   HPI:   Lisa Calloway is a 77-year-old white female presents today for follow-up on going advanced worsening right hip osteoarthritis. Patient has previously undergone intra-articular steroid injection to the right hip that is minimally decreased her pain. She continues to describe worsening debilitating right hip and groin pain. She has difficult with activities of daily living and weight-bearing. She continues to have an antalgic gait and uses a cane for aid with ambulation. Patient has received cardiac clearance and wishes to proceed with right total hip arthroplasty.    Medical History          Past Medical History:    Diagnosis Date    • Anxiety     • Arthritis     • Calcinosis-Raynaud's sclerodactyly-telangiectasia syndrome     • COPD (chronic obstructive pulmonary disease)     • Diastolic heart failure 12/9/2016     Echocardiogram, 08/10/2011: LVH, LVEF 70% and no significant valvular abnormality. Pharmacologic MPS, 07/28/2011: normal, LVEF 80%. Recurrent chest pain/CHF presentations to the [place], [place] area. Cardiac catheterization, 05/23/2012: Normal coronary arteries, LVEF 65%, LVEDP 32 mmHg. Renal angiography, 05/23/2012: normal bilateral renal arteries. Echocardiogram for worsening dyspnea, 03/ • GERD (gastroesophageal reflux disease)     • Heart murmur     • Hiatal hernia     • High cholesterol     • History of recurrent UTIs     • History of staph infection      after hip replacement    • History of stomach ulcers     • Hypertension     • Low back pain     • Lumbar herniated disc      with right-sided sciatica:Status post lumbar fusion, 01/17/2013.    • Panic attacks     • SOB (shortness of breath)     • Wears dentures      upper denture and partial    • Wears eyeglasses          Patient Active Problem List   Diagnosis   • Abnormal mammogram   • Essential hypertension   •  Osteoarthritis (arthritis due to wear and tear of joints)   • Raynaud disease   • Anxiety   • COPD (chronic obstructive pulmonary disease)   • GERD (gastroesophageal reflux disease)   • Hip pain   • Chronic diastolic heart failure   • Palpitations   • Polypharmacy   • Spondylolisthesis, lumbar region   • Spinal stenosis, lumbar region, with neurogenic claudication   • Spondylolisthesis, acquired   • Acquired spondylolisthesis   • Right hip pain      Surgical History           Past Surgical History:    Procedure Laterality Date    • APPENDECTOMY  1975    • CARDIAC SURGERY       cardiac cath    • CHOLECYSTECTOMY  1996    • EYE SURGERY       cataract surgery    • HIP SURGERY      • HYSTERECTOMY  1999    • JOINT REPLACEMENT       hip left    • LUMBAR LAMINECTOMY WITH FUSION N/A 3/17/2017     Procedure: LUMBAR FUSION DECOMPRESSON WITH PEDICLE SCREWS L3-4 ; Surgeon: Sen Skinner MD; Location: [facility]; Service:     • OTHER SURGICAL HISTORY       arthrodesis lumbar    • OTHER SURGICAL HISTORY  1982     neuroplasty decompression median nerve at carpal tunnel    • TONSILLECTOMY  1963    • TUBAL ABDOMINAL LIGATION  1975           Family History   Problem Relation Age of Onset   • Congenital heart disease Mother    • Hypertension Mother    • Stroke Mother    • Cancer Other    • Gout Other       Social History    Social History            Social History     • Marital status:        Spouse name: N/A     • Number of children: N/A     • Years of education: N/A            Occupational History      • Not on file.              Social History Main Topics      • Smoking status: Never Smoker      • Smokeless tobacco: Never Used      • Alcohol use No      • Drug use: No      • Sexual activity: Defer               Other Topics Concern       • Not on file       Social History Narrative          Current Medications            Current Outpatient Prescriptions    Medication Sig Dispense Refill    • amitriptyline (ELAVIL) 10 MG tablet  Take 20 mg by mouth At Night As Needed.      • aspirin 81 MG tablet Take 1 tablet by mouth daily.      • baclofen (LIORESAL) 10 MG tablet Take 1 tablet by mouth 3 (Three) Times a Day.      • busPIRone (BUSPAR) 10 MG tablet Take 10 mg by mouth 4 (Four) Times a Day As Needed.  4    • Calcium-Phosphorus-Vitamin D (CITRACAL +D3 PO) Take 1 tablet by mouth Daily.      • celecoxib (CELEBREX) 200 MG capsule Take 200 mg by mouth 2 (Two) Times a Day.      • fluconazole (DIFLUCAN) 150 MG tablet       • fluticasone (FLONASE) 50 MCG/ACT nasal spray 1 spray into each nostril Daily.  1    • furosemide (LASIX) 20 MG tablet Take 20 mg by mouth Daily As Needed.      • gabapentin (NEURONTIN) 600 MG tablet Take 600 mg by mouth 3 (Three) Times a Day.      • hydrochlorothiazide (MICROZIDE) 12.5 MG capsule Take 12.5 mg by mouth Daily.      • loratadine (CLARITIN) 10 MG tablet Take 10 mg by mouth Daily.  1    • Multiple Vitamin (MULTI VITAMIN DAILY) tablet Take 1 tablet by mouth Daily.      • Multiple Vitamins-Minerals (HAIR SKIN AND NAILS FORMULA PO) Take 1 tablet by mouth Daily.      • NIFEdipine XL (PROCARDIA XL) 30 MG 24 hr tablet Take 30 mg by mouth 3 (Three) Times a Day.      • omeprazole (PriLOSEC) 20 MG capsule Take 20 mg by mouth 2 (Two) Times a Day.      • potassium chloride (MICRO-K) 10 MEQ CR capsule Take 1 tablet by mouth 3 (Three) Times a Day.      • PROAIR  (90 BASE) MCG/ACT inhaler Inhale 2 puffs Every 6 (Six) Hours As Needed.      • sennosides-docusate sodium (SENOKOT-S) 8.6-50 MG tablet Take 1 tablet by mouth As Needed for Constipation.      • traMADol (ULTRAM) 50 MG tablet Take 50 mg by mouth Every 6 (Six) Hours As Needed for Moderate Pain .      No current facility-administered medications for this visit.             Allergies   Allergen Reactions   • Amlodipine GI Intolerance   • Beta Adrenergic Blockers    • Ergotamine-Caffeine    • Lisinopril Other (See Comments)     hypotension   • Metoprolol GI Intolerance  "  • Other    • Tenormin [Atenolol] GI Intolerance   • Trovan [Trovafloxacin]    • Atorvastatin Myalgia   • Cefprozil Rash   • Codeine GI Intolerance   • Contrast Dye Swelling   • Coreg [Carvedilol] GI Intolerance   • Fish-Derived Products Rash   • Penicillins Hives   • Sectral [Acebutolol Hcl] Rash   • Sulfa Antibiotics Rash   • Tetracycline Rash     Review of Systems   Constitution: Positive for malaise/fatigue.   Activity change   HENT: Positive for congestion.   Mouth sores from dentures   Eyes: Negative.   Cardiovascular: Positive for leg swelling and palpitations.   Respiratory: Positive for shortness of breath.   Endocrine: Positive for heat intolerance.   Hematologic/Lymphatic: Bruises/bleeds easily.   Skin: Negative.   Musculoskeletal: Positive for back pain and joint pain.   Pertinent positives listed in HPI   Gastrointestinal: Positive for bloating.   Genitourinary: Positive for frequency.   Neurological: Positive for focal weakness.   Psychiatric/Behavioral: The patient has insomnia and is nervous/anxious.   Allergic/Immunologic: Negative.      Vitals         Vitals:     01/22/18 0956    BP: 169/67    BP Location: Right arm    Patient Position: Sitting    Cuff Size: Adult    Pulse: 97    Weight: 56.7 kg (125 lb)    Height: 152.4 cm (60\")     Physical Exam   Constitutional: She is oriented to person, place, and time. She appears well-developed and well-nourished. No distress.   HENT:   Head: Normocephalic and atraumatic.   Nose: Nose normal.   Mouth/Throat: Oropharynx is clear and moist.   Eyes: Conjunctivae and EOM are normal. Pupils are equal, round, and reactive to light. No scleral icterus.   Neck: Normal range of motion. Neck supple. No JVD present. No tracheal deviation present. No thyromegaly present.   Cardiovascular: Normal rate, regular rhythm and intact distal pulses. Exam reveals no gallop and no friction rub.   Murmur heard.   Pulmonary/Chest: Effort normal and breath sounds normal. No " respiratory distress. She has no wheezes. She has no rales.   Abdominal: Soft. Bowel sounds are normal.   Musculoskeletal:   Examination of patient's right hip today reveals flexion at 90° with 5° arc of rotation. Notable crepitus with range of motion and negative straight leg raise. Approximate half inch shortening right leg compared to the left. Neurovascular status remains grossly intact   Lymphadenopathy:   She has no cervical adenopathy.   Neurological: She is alert and oriented to person, place, and time.   Skin: Skin is warm and dry. No erythema.   Psychiatric: She has a normal mood and affect.   Nursing note and vitals reviewed.     Radiology/Labs:   AP and lateral views right hip as well as an AP view of the pelvis reveals advanced osteoarthritic changes of the right hip with obliteration of the femoral acetabular joint space.   Procedures   Assessment:     ICD-10-CM ICD-9-CM   1. Right hip pain M25.551 719.45     Plan:   We will plan on proceeding with surgery at patient's request for Right Total Hip Arthroplasty 2/20/18. I reviewed details of procedure with patient today and discussed risks, benefits, alternatives, and limitations of the procedure in laymen's terms with the risks including but not limited to: neurovascular damage, bleeding, infection, chronic pain, worsening of pain, swelling, loss of motion, weakness, stiffness, instability, DVT, pulmonary embolus, loss of life/limb, and potential need for additional procedures. No guarantees were given regarding results of surgery.   Patient was given the opportunity to ask and have all questions answered today. The patient voiced understanding of the risks, benefits, and recalcitrance from conservative forms of treatment that were discussed and the patient consents to proceed with Right Total Hip Arthroplasty     Discussion/Summary:   Written by Sumit Barker PA-C, acting as scribe for Dr. Fisher

## 2018-01-26 ENCOUNTER — OFFICE VISIT (OUTPATIENT)
Dept: CARDIOLOGY | Facility: CLINIC | Age: 78
End: 2018-01-26

## 2018-01-26 VITALS
SYSTOLIC BLOOD PRESSURE: 160 MMHG | WEIGHT: 129 LBS | BODY MASS INDEX: 25.32 KG/M2 | DIASTOLIC BLOOD PRESSURE: 72 MMHG | HEIGHT: 60 IN | HEART RATE: 98 BPM

## 2018-01-26 DIAGNOSIS — I50.32 CHRONIC DIASTOLIC HEART FAILURE (HCC): ICD-10-CM

## 2018-01-26 DIAGNOSIS — I10 ESSENTIAL HYPERTENSION: ICD-10-CM

## 2018-01-26 DIAGNOSIS — R60.0 LOCALIZED EDEMA: ICD-10-CM

## 2018-01-26 DIAGNOSIS — I50.32 CHRONIC DIASTOLIC HEART FAILURE (HCC): Primary | ICD-10-CM

## 2018-01-26 PROBLEM — M25.551 RIGHT HIP PAIN: Status: RESOLVED | Noted: 2018-01-22 | Resolved: 2018-01-26

## 2018-01-26 PROBLEM — Z01.818 PRE-OP TESTING: Status: RESOLVED | Noted: 2018-01-24 | Resolved: 2018-01-26

## 2018-01-26 PROCEDURE — 99214 OFFICE O/P EST MOD 30 MIN: CPT | Performed by: INTERNAL MEDICINE

## 2018-01-26 RX ORDER — FUROSEMIDE 40 MG/1
40 TABLET ORAL DAILY
Qty: 90 TABLET | Refills: 1 | Status: SHIPPED | OUTPATIENT
Start: 2018-01-26 | End: 2018-07-25

## 2018-01-26 RX ORDER — FUROSEMIDE 40 MG/1
40 TABLET ORAL DAILY PRN
Qty: 90 TABLET | Refills: 1 | Status: SHIPPED | OUTPATIENT
Start: 2018-01-26 | End: 2018-01-26 | Stop reason: SDUPTHER

## 2018-01-26 NOTE — PROGRESS NOTES
Encounter Date:01/26/2018    Patient ID: Lisa Calloway is a 77 y.o. female who resides in [place], [place].    CC/Reason for visit:  Chronic diastolic heart failure            Lisa Calloway returns to my office today in follow up of her chronic diastolic heart failure and hypertension. Since last visit, patient has been feeling well overall from a cardiovascular standpoint. She states that can barely walk and is going February 20th to get it replaced with Dr. Fisher. She has been experiencing significant lower extremity edema. Patient has also been struggling with advanced arthritis. She reports chronic weakness with anything she does. She also gets short of breath with exertion. She believes a lot of her symptoms are due to anxiety.    Review of Systems   Constitution: Positive for diaphoresis, weakness, malaise/fatigue and night sweats.   Eyes: Negative for vision loss in left eye and vision loss in right eye.   Cardiovascular: Positive for leg swelling and palpitations. Negative for chest pain, dyspnea on exertion, near-syncope, orthopnea, paroxysmal nocturnal dyspnea and syncope.   Respiratory: Positive for shortness of breath.    Endocrine: Positive for heat intolerance.   Hematologic/Lymphatic: Bruises/bleeds easily.   Musculoskeletal: Positive for arthritis, joint pain, joint swelling and muscle weakness. Negative for myalgias.   Gastrointestinal: Positive for bloating and heartburn.   Neurological: Negative for brief paralysis, excessive daytime sleepiness, focal weakness, numbness and paresthesias.   Psychiatric/Behavioral: The patient is nervous/anxious.    Allergic/Immunologic:        Food allergies    All other systems reviewed and are negative.      The patient's past medical, social, family history and ROS reviewed in the patient's electronic medical record.    Allergies  Amlodipine; Beta adrenergic blockers; Ergotamine-caffeine; Lisinopril; Metoprolol; Other; Tenormin [atenolol]; Trovan  [trovafloxacin]; Atorvastatin; Cefprozil; Codeine; Contrast dye; Coreg [carvedilol]; Fish-derived products; Penicillins; Sectral [acebutolol hcl]; Sulfa antibiotics; and Tetracycline    Outpatient Prescriptions Marked as Taking for the 1/26/18 encounter (Office Visit) with Flako Lam IV, MD   Medication Sig Dispense Refill   • amitriptyline (ELAVIL) 10 MG tablet Take 20 mg by mouth At Night As Needed.     • aspirin 81 MG tablet Take 1 tablet by mouth daily.     • baclofen (LIORESAL) 10 MG tablet Take 1 tablet by mouth 3 (Three) Times a Day.     • busPIRone (BUSPAR) 10 MG tablet Take 10 mg by mouth 4 (Four) Times a Day As Needed.  4   • Calcium-Phosphorus-Vitamin D (CITRACAL +D3 PO) Take 1 tablet by mouth Daily.     • celecoxib (CELEBREX) 200 MG capsule Take 200 mg by mouth 2 (Two) Times a Day.     • fluticasone (FLONASE) 50 MCG/ACT nasal spray 1 spray into each nostril Daily.  1   • furosemide (LASIX) 40 MG tablet Take 1 tablet by mouth Daily for 180 days. 90 tablet 1   • gabapentin (NEURONTIN) 600 MG tablet Take 600 mg by mouth 3 (Three) Times a Day.     • loratadine (CLARITIN) 10 MG tablet Take 10 mg by mouth Daily.  1   • Multiple Vitamin (MULTI VITAMIN DAILY) tablet Take 1 tablet by mouth Daily.     • Multiple Vitamins-Minerals (HAIR SKIN AND NAILS FORMULA PO) Take 1 tablet by mouth Daily.     • NIFEdipine XL (PROCARDIA XL) 30 MG 24 hr tablet Take 30 mg by mouth 3 (Three) Times a Day.     • omeprazole (PriLOSEC) 20 MG capsule Take 20 mg by mouth 2 (Two) Times a Day.     • potassium chloride (MICRO-K) 10 MEQ CR capsule Take 1 tablet by mouth 3 (Three) Times a Day.     • PROAIR  (90 BASE) MCG/ACT inhaler Inhale 2 puffs Every 6 (Six) Hours As Needed.     • traMADol (ULTRAM) 50 MG tablet Take 50 mg by mouth Every 6 (Six) Hours As Needed for Moderate Pain .     • [DISCONTINUED] furosemide (LASIX) 20 MG tablet Take 20 mg by mouth Daily As Needed.     • [DISCONTINUED] furosemide (LASIX) 40 MG  "tablet Take 1 tablet by mouth Daily As Needed (swelling). 90 tablet 1   • [DISCONTINUED] hydrochlorothiazide (MICROZIDE) 12.5 MG capsule Take 12.5 mg by mouth Daily.           Blood pressure 160/72, pulse 98, height 152.4 cm (60\"), weight 58.5 kg (129 lb), unknown if currently breastfeeding.  Body mass index is 25.19 kg/(m^2).    Physical Exam   Constitutional: She is oriented to person, place, and time. She appears well-developed and well-nourished.   HENT:   Head: Normocephalic and atraumatic.   Eyes: Pupils are equal, round, and reactive to light. No scleral icterus.   Neck: No JVD present. Carotid bruit is not present. No thyromegaly present.   Cardiovascular: Normal rate, regular rhythm, S1 normal and S2 normal.  Exam reveals no gallop.    Murmur heard.  High-pitched blowing holosystolic murmur is present with a grade of 2/6  at the apex  Pulmonary/Chest: Effort normal and breath sounds normal.   Abdominal: Soft. There is no hepatosplenomegaly. There is no tenderness.   Neurological: She is alert and oriented to person, place, and time.   Skin: Skin is warm and dry. No cyanosis. Nails show no clubbing.   Psychiatric: She has a normal mood and affect. Her behavior is normal.       Data Review:     Lab Results   Component Value Date    WBC 6.95 03/16/2017    HGB 9.3 (L) 03/18/2017    HCT 28.6 (L) 03/18/2017    MCV 90.3 03/16/2017     03/16/2017     Lab Results   Component Value Date    HGBA1C 5.80 (H) 03/16/2017     Lab Results   Component Value Date    GLUCOSE 158 (H) 03/16/2017    BUN 16 03/16/2017    CREATININE 0.70 03/16/2017    EGFRIFNONA 81 03/16/2017    BCR 22.9 03/16/2017    K 3.3 (L) 03/16/2017    CO2 28.0 03/16/2017    CALCIUM 10.1 03/16/2017    ALBUMIN 4.60 03/16/2017    LABIL2 1.6 03/16/2017    AST 33 03/16/2017    ALT 31 03/16/2017     Procedures       Problem List Items Addressed This Visit        Cardiovascular and Mediastinum    Essential hypertension    Overview     · Renal angiography " (5/23/2012): normal bilateral renal arteries.          Current Assessment & Plan     Blood pressure remains elevated today. She's had historical intolerance to multiple medications to treat her blood pressure and unfortunately I believe this is causing some of her diastolic heart failure symptoms. I will intensify her diuretic therapy today in hopes of removing her heart failure symptoms and reducing her edema.         Relevant Medications    furosemide (LASIX) 40 MG tablet    Chronic diastolic heart failure - Primary    Overview     · Echo (8/10/2011): LVH. LVEF 70%.  No significant valvular abnormality.   · Pharmacologic nuclear stress (7/28/2011): No ischemia. LVEF 80%.  · Recurrent chest [facility]/CHF presentations to the [facility].  · Cardiac catheterization (5/23/2012):  Normal coronary arteries, LVEF 65%, LVEDP 32 mmHg.  · Echocardiogram for worsening dyspnea (3/18/2016): LVEF >65%. No significant valvular abnormalities. RVSP 43 mmHg.   · GXT (3/18/2016): Exercised for 3 minutes (6 minutes expected). No ischemic changes. Normal oxygen saturation.          Current Assessment & Plan     · Functional class III heart failure symptoms which are stable  · We will intensify her diuretic therapy by increasing furosemide dosing to 40 mg daily and discontinuing hydrochlorothiazide.         Relevant Medications    furosemide (LASIX) 40 MG tablet    Other Relevant Orders    CBC (No Diff)    Basic Metabolic Panel    BNP    Basic Metabolic Panel       Other    Localized edema    Current Assessment & Plan     Bilateral lower extremity edema multifactorial and due to vascular insufficiency, nifedipine use, and diastolic heart failure.                    · Discontinue hydrochlorothiazide  · Increase furosemide to 40 mg daily  · BMP today and in 2 weeks  · Proceed with right total hip arthroplasty with no further cardiac testing  · Return in about 6 months (around 7/26/2018).      Flako Lam IV,  MD  1/26/2018     Scribed for Flako Lam IV, MD by Jacobo Horn. 1/26/2018  2:17 PM

## 2018-01-26 NOTE — ASSESSMENT & PLAN NOTE
· Functional class III heart failure symptoms which are stable  · We will intensify her diuretic therapy by increasing furosemide dosing to 40 mg daily and discontinuing hydrochlorothiazide.

## 2018-01-26 NOTE — ASSESSMENT & PLAN NOTE
Blood pressure remains elevated today. She's had historical intolerance to multiple medications to treat her blood pressure and unfortunately I believe this is causing some of her diastolic heart failure symptoms. I will intensify her diuretic therapy today in hopes of removing her heart failure symptoms and reducing her edema.

## 2018-01-26 NOTE — ASSESSMENT & PLAN NOTE
Bilateral lower extremity edema multifactorial and due to vascular insufficiency, nifedipine use, and diastolic heart failure.

## 2018-01-27 LAB
BNP SERPL-MCNC: 22.2 PG/ML (ref 0–100)
BUN SERPL-MCNC: 20 MG/DL (ref 7–20)
BUN/CREAT SERPL: 28.6 (ref 7.1–23.5)
CALCIUM SERPL-MCNC: 10.1 MG/DL (ref 8.4–10.2)
CHLORIDE SERPL-SCNC: 102 MMOL/L (ref 98–107)
CO2 SERPL-SCNC: 25 MMOL/L (ref 26–30)
CREAT SERPL-MCNC: 0.7 MG/DL (ref 0.6–1.3)
ERYTHROCYTE [DISTWIDTH] IN BLOOD BY AUTOMATED COUNT: 14.4 % (ref 11.5–14.5)
GFR SERPLBLD CREATININE-BSD FMLA CKD-EPI: 81 ML/MIN/1.73
GFR SERPLBLD CREATININE-BSD FMLA CKD-EPI: 98 ML/MIN/1.73
GLUCOSE SERPL-MCNC: 112 MG/DL (ref 74–98)
HCT VFR BLD AUTO: 40 % (ref 37–47)
HGB BLD-MCNC: 13.1 G/DL (ref 12–16)
MCH RBC QN AUTO: 29.2 PG (ref 27–31)
MCHC RBC AUTO-ENTMCNC: 32.8 G/DL (ref 30–37)
MCV RBC AUTO: 89.1 FL (ref 81–99)
PLATELET # BLD AUTO: 398 10*3/MM3 (ref 130–400)
POTASSIUM SERPL-SCNC: 3.8 MMOL/L (ref 3.5–5.1)
RBC # BLD AUTO: 4.49 10*6/MM3 (ref 4.2–5.4)
SODIUM SERPL-SCNC: 139 MMOL/L (ref 137–145)
WBC # BLD AUTO: 6.58 10*3/MM3 (ref 4.8–10.8)

## 2018-02-14 ENCOUNTER — APPOINTMENT (OUTPATIENT)
Dept: PREADMISSION TESTING | Facility: HOSPITAL | Age: 78
End: 2018-02-14

## 2018-02-14 DIAGNOSIS — I50.32 CHRONIC DIASTOLIC HEART FAILURE (HCC): ICD-10-CM

## 2018-02-14 DIAGNOSIS — Z01.818 PRE-OP TESTING: Primary | ICD-10-CM

## 2018-02-14 DIAGNOSIS — M16.11 PRIMARY OSTEOARTHRITIS OF RIGHT HIP: ICD-10-CM

## 2018-02-14 DIAGNOSIS — Z01.818 PRE-OP TESTING: ICD-10-CM

## 2018-02-14 DIAGNOSIS — Z22.322 CARRIER OR SUSPECTED CARRIER OF METHICILLIN RESISTANT STAPHYLOCOCCUS AUREUS (CODE): ICD-10-CM

## 2018-02-14 DIAGNOSIS — M25.551 RIGHT HIP PAIN: ICD-10-CM

## 2018-02-14 LAB
ABO GROUP BLD: NORMAL
ANION GAP SERPL CALCULATED.3IONS-SCNC: 14.7 MMOL/L (ref 3.6–11.2)
BASOPHILS # BLD AUTO: 0.01 10*3/MM3 (ref 0–0.3)
BASOPHILS NFR BLD AUTO: 0.1 % (ref 0–2)
BLD GP AB SCN SERPL QL: NEGATIVE
BNP SERPL-MCNC: 28 PG/ML (ref 0–100)
BUN BLD-MCNC: 20 MG/DL (ref 7–21)
BUN/CREAT SERPL: 22.2 (ref 7–25)
CALCIUM SPEC-SCNC: 9.4 MG/DL (ref 7.7–10)
CHLORIDE SERPL-SCNC: 103 MMOL/L (ref 99–112)
CO2 SERPL-SCNC: 22.3 MMOL/L (ref 24.3–31.9)
CREAT BLD-MCNC: 0.9 MG/DL (ref 0.43–1.29)
DEPRECATED RDW RBC AUTO: 46.7 FL (ref 37–54)
EOSINOPHIL # BLD AUTO: 0.08 10*3/MM3 (ref 0–0.7)
EOSINOPHIL NFR BLD AUTO: 1 % (ref 0–7)
ERYTHROCYTE [DISTWIDTH] IN BLOOD BY AUTOMATED COUNT: 14.3 % (ref 11.5–14.5)
GFR SERPL CREATININE-BSD FRML MDRD: 60 ML/MIN/1.73
GLUCOSE BLD-MCNC: 116 MG/DL (ref 70–110)
HCT VFR BLD AUTO: 38.5 % (ref 37–47)
HGB BLD-MCNC: 12.4 G/DL (ref 12–16)
IMM GRANULOCYTES # BLD: 0.02 10*3/MM3 (ref 0–0.03)
IMM GRANULOCYTES NFR BLD: 0.3 % (ref 0–0.5)
LYMPHOCYTES # BLD AUTO: 2.07 10*3/MM3 (ref 1–3)
LYMPHOCYTES NFR BLD AUTO: 25.9 % (ref 16–46)
MCH RBC QN AUTO: 28.9 PG (ref 27–33)
MCHC RBC AUTO-ENTMCNC: 32.2 G/DL (ref 33–37)
MCV RBC AUTO: 89.7 FL (ref 80–94)
MONOCYTES # BLD AUTO: 0.78 10*3/MM3 (ref 0.1–0.9)
MONOCYTES NFR BLD AUTO: 9.8 % (ref 0–12)
MRSA DNA SPEC QL NAA+PROBE: POSITIVE
NEUTROPHILS # BLD AUTO: 5.04 10*3/MM3 (ref 1.4–6.5)
NEUTROPHILS NFR BLD AUTO: 62.9 % (ref 40–75)
OSMOLALITY SERPL CALC.SUM OF ELEC: 283 MOSM/KG (ref 273–305)
PLATELET # BLD AUTO: 332 10*3/MM3 (ref 130–400)
PMV BLD AUTO: 10 FL (ref 6–10)
POTASSIUM BLD-SCNC: 3.3 MMOL/L (ref 3.5–5.3)
RBC # BLD AUTO: 4.29 10*6/MM3 (ref 4.2–5.4)
RH BLD: POSITIVE
S AUREUS DNA SPEC QL NAA+PROBE: POSITIVE
SODIUM BLD-SCNC: 140 MMOL/L (ref 135–153)
WBC NRBC COR # BLD: 8 10*3/MM3 (ref 4.5–12.5)

## 2018-02-14 PROCEDURE — 86850 RBC ANTIBODY SCREEN: CPT | Performed by: ORTHOPAEDIC SURGERY

## 2018-02-14 PROCEDURE — 93010 ELECTROCARDIOGRAM REPORT: CPT | Performed by: INTERNAL MEDICINE

## 2018-02-14 PROCEDURE — 87641 MR-STAPH DNA AMP PROBE: CPT | Performed by: ORTHOPAEDIC SURGERY

## 2018-02-14 PROCEDURE — 86901 BLOOD TYPING SEROLOGIC RH(D): CPT | Performed by: ORTHOPAEDIC SURGERY

## 2018-02-14 PROCEDURE — 87640 STAPH A DNA AMP PROBE: CPT | Performed by: ORTHOPAEDIC SURGERY

## 2018-02-14 PROCEDURE — 85025 COMPLETE CBC W/AUTO DIFF WBC: CPT | Performed by: ORTHOPAEDIC SURGERY

## 2018-02-14 PROCEDURE — 93005 ELECTROCARDIOGRAM TRACING: CPT

## 2018-02-14 PROCEDURE — 86900 BLOOD TYPING SEROLOGIC ABO: CPT | Performed by: ORTHOPAEDIC SURGERY

## 2018-02-14 PROCEDURE — 80048 BASIC METABOLIC PNL TOTAL CA: CPT | Performed by: ORTHOPAEDIC SURGERY

## 2018-02-14 PROCEDURE — 36415 COLL VENOUS BLD VENIPUNCTURE: CPT

## 2018-02-14 PROCEDURE — 83880 ASSAY OF NATRIURETIC PEPTIDE: CPT | Performed by: INTERNAL MEDICINE

## 2018-02-14 RX ORDER — ACETAMINOPHEN 500 MG
500 TABLET ORAL EVERY 6 HOURS PRN
COMMUNITY
End: 2018-02-23 | Stop reason: HOSPADM

## 2018-02-14 RX ORDER — POTASSIUM CHLORIDE 750 MG/1
10 TABLET, EXTENDED RELEASE ORAL 3 TIMES DAILY
COMMUNITY
End: 2019-08-02 | Stop reason: SDUPTHER

## 2018-02-14 NOTE — DISCHARGE INSTRUCTIONS
TAKE the following medications the morning of surgery:  All heart or blood pressure medications    Please discontinue all blood thinners and anticoagulants (except aspirin) prior to surgery as per your surgeon and cardiologist instructions.  Aspirin may be continued up to the day prior to surgery.    HOLD all diabetic medications the morning of surgery as order by physician.    Please follow instructions on use of prep cloths provided by nurse. Return instruction sheet with stickers attached to pre-op nurse on day of surgery.    Arrival time for surgery on 2/20/2018 is 0600 am.    General Instructions:  • Do NOT eat or drink after midnight 2/19/2018 which includes water, mints, or gum.  • You may brush your teeth. Dental appliances that are removable must be taken out day of surgery.  • Do NOT smoke, chew tobacco, or drink alcohol within 24 hours prior to surgery.  • Bring medications in original bottles, any inhalers and if applicable your C-PAP/BI-PAP machine  • Bring any papers given to you in the doctor’s office  • Wear clean, comfortable clothes and socks  • Do NOT wear contact lenses or make-up or dark nail polish.  Bring a case for your glasses if applicable.  • Bring crutches or walker if applicable  • Leave all other valuables and jewelry at home  • If you were given a blood bank armband, continue to wear it until discharged.    Preventing a Surgical Site Infection:  • Shower on the morning of surgery using a fresh bar of anti-bacterial soap (such as Dial) and clean washcloth.  Dry with a clean towel and dress in clean clothing.  • For 2 to 3 days before surgery, avoid shaving with a razor near where you will have surgery because the razor can irritate skin and make it easier to develop an infection.  Ask your surgeon if you will be receiving antibiotics prior to surgery.  • Make sure you, your family, and all healthcare providers clean their hands with soap and water or an alcohol-based hand   before caring for you or your wound.  • If at all possible, quit smoking as many days before surgery as you can.    Day of Surgery:  Upon arrival, a pre-op nurse and anesthesiologist will review your health history, obtain vital signs, and answer questions you may have.  The only belongings needed at this time will be your home medications and if applicable you C-PAP/BI-PAP machine.  If you are staying overnight, your family can leave the rest of your belongings in the car and bring them to your room later.  A pre-op nurse will start an IV and you may receive medication in preparation for surgery.  Due to patient privacy and limited space, only one member of your family will be able to accompany you in the pre-op area.  While you are in surgery your family should notify the waiting room  if they leave the waiting room area and provide a contact number.  Please be aware that surgery does come with discomfort.  We want to make every effort to control your discomfort so please discuss any uncontrolled symptoms with your nurse.  Your doctor will most likely have prescribed pain medications.  If you are going home after surgery you will receive individualized written care instructions before being discharged.  A responsible adult must drive you to and from the hospital on the day of surgery and stay with you for 24 hours.  If you are staying overnight following surgery, you will be transported to your hospital room following the recovery period.

## 2018-02-19 ENCOUNTER — TELEPHONE (OUTPATIENT)
Dept: ORTHOPEDIC SURGERY | Facility: CLINIC | Age: 78
End: 2018-02-19

## 2018-02-20 ENCOUNTER — HOSPITAL ENCOUNTER (INPATIENT)
Facility: HOSPITAL | Age: 78
LOS: 3 days | Discharge: NURSING, REHAB OR LONG-TERM CARE | End: 2018-02-23
Attending: ORTHOPAEDIC SURGERY | Admitting: ORTHOPAEDIC SURGERY

## 2018-02-20 ENCOUNTER — ANESTHESIA (OUTPATIENT)
Dept: PERIOP | Facility: HOSPITAL | Age: 78
End: 2018-02-20

## 2018-02-20 ENCOUNTER — ANESTHESIA EVENT (OUTPATIENT)
Dept: PERIOP | Facility: HOSPITAL | Age: 78
End: 2018-02-20

## 2018-02-20 ENCOUNTER — APPOINTMENT (OUTPATIENT)
Dept: GENERAL RADIOLOGY | Facility: HOSPITAL | Age: 78
End: 2018-02-20

## 2018-02-20 DIAGNOSIS — Z01.818 PRE-OP TESTING: ICD-10-CM

## 2018-02-20 DIAGNOSIS — M25.551 RIGHT HIP PAIN: ICD-10-CM

## 2018-02-20 DIAGNOSIS — M16.11 PRIMARY OSTEOARTHRITIS OF RIGHT HIP: ICD-10-CM

## 2018-02-20 LAB
ANION GAP SERPL CALCULATED.3IONS-SCNC: 7.9 MMOL/L (ref 3.6–11.2)
BASOPHILS # BLD AUTO: 0 10*3/MM3 (ref 0–0.3)
BASOPHILS NFR BLD AUTO: 0 % (ref 0–2)
BUN BLD-MCNC: 21 MG/DL (ref 7–21)
BUN/CREAT SERPL: 28.8 (ref 7–25)
CALCIUM SPEC-SCNC: 8.4 MG/DL (ref 7.7–10)
CHLORIDE SERPL-SCNC: 106 MMOL/L (ref 99–112)
CO2 SERPL-SCNC: 26.1 MMOL/L (ref 24.3–31.9)
CREAT BLD-MCNC: 0.73 MG/DL (ref 0.43–1.29)
DEPRECATED RDW RBC AUTO: 47.6 FL (ref 37–54)
EOSINOPHIL # BLD AUTO: 0 10*3/MM3 (ref 0–0.7)
EOSINOPHIL NFR BLD AUTO: 0 % (ref 0–7)
ERYTHROCYTE [DISTWIDTH] IN BLOOD BY AUTOMATED COUNT: 14.5 % (ref 11.5–14.5)
GFR SERPL CREATININE-BSD FRML MDRD: 77 ML/MIN/1.73
GLUCOSE BLD-MCNC: 169 MG/DL (ref 70–110)
HCT VFR BLD AUTO: 32.8 % (ref 37–47)
HGB BLD-MCNC: 10.5 G/DL (ref 12–16)
IMM GRANULOCYTES # BLD: 0.04 10*3/MM3 (ref 0–0.03)
IMM GRANULOCYTES NFR BLD: 0.3 % (ref 0–0.5)
LYMPHOCYTES # BLD AUTO: 0.41 10*3/MM3 (ref 1–3)
LYMPHOCYTES NFR BLD AUTO: 3.4 % (ref 16–46)
MCH RBC QN AUTO: 28.8 PG (ref 27–33)
MCHC RBC AUTO-ENTMCNC: 32 G/DL (ref 33–37)
MCV RBC AUTO: 89.9 FL (ref 80–94)
MONOCYTES # BLD AUTO: 0.46 10*3/MM3 (ref 0.1–0.9)
MONOCYTES NFR BLD AUTO: 3.8 % (ref 0–12)
NEUTROPHILS # BLD AUTO: 11.08 10*3/MM3 (ref 1.4–6.5)
NEUTROPHILS NFR BLD AUTO: 92.5 % (ref 40–75)
OSMOLALITY SERPL CALC.SUM OF ELEC: 286.3 MOSM/KG (ref 273–305)
PLATELET # BLD AUTO: 298 10*3/MM3 (ref 130–400)
PMV BLD AUTO: 10.1 FL (ref 6–10)
POTASSIUM BLD-SCNC: 3.4 MMOL/L (ref 3.5–5.3)
POTASSIUM BLD-SCNC: 3.7 MMOL/L (ref 3.5–5.3)
RBC # BLD AUTO: 3.65 10*6/MM3 (ref 4.2–5.4)
SODIUM BLD-SCNC: 140 MMOL/L (ref 135–153)
WBC NRBC COR # BLD: 11.99 10*3/MM3 (ref 4.5–12.5)

## 2018-02-20 PROCEDURE — 97163 PT EVAL HIGH COMPLEX 45 MIN: CPT

## 2018-02-20 PROCEDURE — 73501 X-RAY EXAM HIP UNI 1 VIEW: CPT | Performed by: RADIOLOGY

## 2018-02-20 PROCEDURE — 25010000002 FENTANYL CITRATE (PF) 100 MCG/2ML SOLUTION: Performed by: NURSE ANESTHETIST, CERTIFIED REGISTERED

## 2018-02-20 PROCEDURE — 85025 COMPLETE CBC W/AUTO DIFF WBC: CPT | Performed by: PHYSICIAN ASSISTANT

## 2018-02-20 PROCEDURE — G8978 MOBILITY CURRENT STATUS: HCPCS

## 2018-02-20 PROCEDURE — 25010000002 VANCOMYCIN PER 500 MG: Performed by: ORTHOPAEDIC SURGERY

## 2018-02-20 PROCEDURE — 97116 GAIT TRAINING THERAPY: CPT

## 2018-02-20 PROCEDURE — 94799 UNLISTED PULMONARY SVC/PX: CPT

## 2018-02-20 PROCEDURE — 25010000002 ONDANSETRON PER 1 MG: Performed by: NURSE ANESTHETIST, CERTIFIED REGISTERED

## 2018-02-20 PROCEDURE — 97530 THERAPEUTIC ACTIVITIES: CPT

## 2018-02-20 PROCEDURE — 84132 ASSAY OF SERUM POTASSIUM: CPT | Performed by: ORTHOPAEDIC SURGERY

## 2018-02-20 PROCEDURE — C1776 JOINT DEVICE (IMPLANTABLE): HCPCS | Performed by: ORTHOPAEDIC SURGERY

## 2018-02-20 PROCEDURE — 80048 BASIC METABOLIC PNL TOTAL CA: CPT | Performed by: PHYSICIAN ASSISTANT

## 2018-02-20 PROCEDURE — 27130 TOTAL HIP ARTHROPLASTY: CPT | Performed by: ORTHOPAEDIC SURGERY

## 2018-02-20 PROCEDURE — 25010000002 DEXAMETHASONE PER 1 MG: Performed by: NURSE ANESTHETIST, CERTIFIED REGISTERED

## 2018-02-20 PROCEDURE — 25010000002 PROPOFOL 10 MG/ML EMULSION: Performed by: NURSE ANESTHETIST, CERTIFIED REGISTERED

## 2018-02-20 PROCEDURE — 0SR90JZ REPLACEMENT OF RIGHT HIP JOINT WITH SYNTHETIC SUBSTITUTE, OPEN APPROACH: ICD-10-PCS | Performed by: ORTHOPAEDIC SURGERY

## 2018-02-20 PROCEDURE — 25010000002 HYDROMORPHONE PER 4 MG: Performed by: ANESTHESIOLOGY

## 2018-02-20 PROCEDURE — G8979 MOBILITY GOAL STATUS: HCPCS

## 2018-02-20 PROCEDURE — 73501 X-RAY EXAM HIP UNI 1 VIEW: CPT

## 2018-02-20 PROCEDURE — 99221 1ST HOSP IP/OBS SF/LOW 40: CPT | Performed by: INTERNAL MEDICINE

## 2018-02-20 DEVICE — M-SPEC METAL FEMORAL HEAD 12/14 TAPER DIAMETER 36MM -2: Type: IMPLANTABLE DEVICE | Site: HIP | Status: FUNCTIONAL

## 2018-02-20 DEVICE — PINNACLE HIP SOLUTIONS ALTRX POLYETHYLENE ACETABULAR LINER +4 10 DEGREE 36MM ID 52MM OD
Type: IMPLANTABLE DEVICE | Site: HIP | Status: FUNCTIONAL
Brand: PINNACLE ALTRX

## 2018-02-20 DEVICE — TOTL HIP GRIPTION CUP DEPUY UPCHRG: Type: IMPLANTABLE DEVICE | Site: HIP | Status: FUNCTIONAL

## 2018-02-20 DEVICE — TOTL HIP MOA DEPUY 9641333: Type: IMPLANTABLE DEVICE | Site: HIP | Status: FUNCTIONAL

## 2018-02-20 DEVICE — TOTL HIP STEM DEPUY UPCHRG: Type: IMPLANTABLE DEVICE | Site: HIP | Status: FUNCTIONAL

## 2018-02-20 DEVICE — PINNACLE GRIPTION ACETABULAR SHELL SECTOR 52MM OD
Type: IMPLANTABLE DEVICE | Site: HIP | Status: FUNCTIONAL
Brand: PINNACLE GRIPTION

## 2018-02-20 DEVICE — PINNACLE CANCELLOUS BONE SCREW 6.5MM X 20MM
Type: IMPLANTABLE DEVICE | Site: HIP | Status: FUNCTIONAL
Brand: PINNACLE

## 2018-02-20 DEVICE — CORAIL HIP SYSTEM CEMENTLESS FEMORAL STEM 12/14 AMT 135 DEGREES KA SIZE 10 HA COATED STANDARD COLLAR
Type: IMPLANTABLE DEVICE | Site: HIP | Status: FUNCTIONAL
Brand: CORAIL

## 2018-02-20 RX ORDER — HYDROMORPHONE HYDROCHLORIDE 1 MG/ML
0.5 INJECTION, SOLUTION INTRAMUSCULAR; INTRAVENOUS; SUBCUTANEOUS ONCE
Status: COMPLETED | OUTPATIENT
Start: 2018-02-20 | End: 2018-02-20

## 2018-02-20 RX ORDER — ONDANSETRON 2 MG/ML
INJECTION INTRAMUSCULAR; INTRAVENOUS AS NEEDED
Status: DISCONTINUED | OUTPATIENT
Start: 2018-02-20 | End: 2018-02-20 | Stop reason: SURG

## 2018-02-20 RX ORDER — HYDROMORPHONE HYDROCHLORIDE 1 MG/ML
0.5 INJECTION, SOLUTION INTRAMUSCULAR; INTRAVENOUS; SUBCUTANEOUS
Status: DISCONTINUED | OUTPATIENT
Start: 2018-02-20 | End: 2018-02-23 | Stop reason: HOSPADM

## 2018-02-20 RX ORDER — POTASSIUM CHLORIDE 20 MEQ/1
40 TABLET, EXTENDED RELEASE ORAL EVERY 4 HOURS
Status: COMPLETED | OUTPATIENT
Start: 2018-02-20 | End: 2018-02-21

## 2018-02-20 RX ORDER — POTASSIUM CHLORIDE 1.5 G/1.77G
40 POWDER, FOR SOLUTION ORAL AS NEEDED
Status: DISCONTINUED | OUTPATIENT
Start: 2018-02-20 | End: 2018-02-23 | Stop reason: HOSPADM

## 2018-02-20 RX ORDER — MULTIVITAMIN
1 TABLET ORAL DAILY
Status: DISCONTINUED | OUTPATIENT
Start: 2018-02-20 | End: 2018-02-23 | Stop reason: HOSPADM

## 2018-02-20 RX ORDER — ONDANSETRON 4 MG/1
4 TABLET, FILM COATED ORAL EVERY 6 HOURS PRN
Status: DISCONTINUED | OUTPATIENT
Start: 2018-02-20 | End: 2018-02-23 | Stop reason: HOSPADM

## 2018-02-20 RX ORDER — CETIRIZINE HYDROCHLORIDE 10 MG/1
5 TABLET ORAL DAILY
Status: DISCONTINUED | OUTPATIENT
Start: 2018-02-21 | End: 2018-02-23 | Stop reason: HOSPADM

## 2018-02-20 RX ORDER — NIFEDIPINE 30 MG/1
30 TABLET, EXTENDED RELEASE ORAL 3 TIMES DAILY
Status: DISCONTINUED | OUTPATIENT
Start: 2018-02-20 | End: 2018-02-20

## 2018-02-20 RX ORDER — ONDANSETRON 2 MG/ML
4 INJECTION INTRAMUSCULAR; INTRAVENOUS EVERY 6 HOURS PRN
Status: DISCONTINUED | OUTPATIENT
Start: 2018-02-20 | End: 2018-02-23 | Stop reason: HOSPADM

## 2018-02-20 RX ORDER — POTASSIUM CHLORIDE 750 MG/1
10 TABLET, FILM COATED, EXTENDED RELEASE ORAL 3 TIMES DAILY
Status: DISCONTINUED | OUTPATIENT
Start: 2018-02-20 | End: 2018-02-20

## 2018-02-20 RX ORDER — GABAPENTIN 300 MG/1
600 CAPSULE ORAL EVERY 8 HOURS SCHEDULED
Status: DISCONTINUED | OUTPATIENT
Start: 2018-02-20 | End: 2018-02-23 | Stop reason: HOSPADM

## 2018-02-20 RX ORDER — MAGNESIUM SULFATE HEPTAHYDRATE 40 MG/ML
2 INJECTION, SOLUTION INTRAVENOUS AS NEEDED
Status: DISCONTINUED | OUTPATIENT
Start: 2018-02-20 | End: 2018-02-23 | Stop reason: HOSPADM

## 2018-02-20 RX ORDER — ALBUTEROL SULFATE 2.5 MG/3ML
2.5 SOLUTION RESPIRATORY (INHALATION) EVERY 4 HOURS PRN
Status: DISCONTINUED | OUTPATIENT
Start: 2018-02-20 | End: 2018-02-23 | Stop reason: HOSPADM

## 2018-02-20 RX ORDER — SODIUM CHLORIDE 0.9 % (FLUSH) 0.9 %
1-10 SYRINGE (ML) INJECTION AS NEEDED
Status: DISCONTINUED | OUTPATIENT
Start: 2018-02-20 | End: 2018-02-20 | Stop reason: HOSPADM

## 2018-02-20 RX ORDER — IPRATROPIUM BROMIDE AND ALBUTEROL SULFATE 2.5; .5 MG/3ML; MG/3ML
3 SOLUTION RESPIRATORY (INHALATION)
Status: DISCONTINUED | OUTPATIENT
Start: 2018-02-20 | End: 2018-02-21

## 2018-02-20 RX ORDER — ACETAMINOPHEN 325 MG/1
500 TABLET ORAL EVERY 6 HOURS PRN
Status: DISCONTINUED | OUTPATIENT
Start: 2018-02-20 | End: 2018-02-21

## 2018-02-20 RX ORDER — PROPOFOL 10 MG/ML
VIAL (ML) INTRAVENOUS AS NEEDED
Status: DISCONTINUED | OUTPATIENT
Start: 2018-02-20 | End: 2018-02-20 | Stop reason: SURG

## 2018-02-20 RX ORDER — POTASSIUM CHLORIDE 7.45 MG/ML
10 INJECTION INTRAVENOUS
Status: DISCONTINUED | OUTPATIENT
Start: 2018-02-20 | End: 2018-02-23 | Stop reason: HOSPADM

## 2018-02-20 RX ORDER — DEXAMETHASONE SODIUM PHOSPHATE 4 MG/ML
INJECTION, SOLUTION INTRA-ARTICULAR; INTRALESIONAL; INTRAMUSCULAR; INTRAVENOUS; SOFT TISSUE AS NEEDED
Status: DISCONTINUED | OUTPATIENT
Start: 2018-02-20 | End: 2018-02-20 | Stop reason: SURG

## 2018-02-20 RX ORDER — TRAMADOL HYDROCHLORIDE 50 MG/1
50 TABLET ORAL EVERY 6 HOURS PRN
Status: DISCONTINUED | OUTPATIENT
Start: 2018-02-20 | End: 2018-02-23 | Stop reason: HOSPADM

## 2018-02-20 RX ORDER — FLUTICASONE PROPIONATE 50 MCG
1 SPRAY, SUSPENSION (ML) NASAL DAILY
Status: DISCONTINUED | OUTPATIENT
Start: 2018-02-20 | End: 2018-02-23 | Stop reason: HOSPADM

## 2018-02-20 RX ORDER — OXYCODONE HYDROCHLORIDE AND ACETAMINOPHEN 5; 325 MG/1; MG/1
1 TABLET ORAL ONCE AS NEEDED
Status: DISCONTINUED | OUTPATIENT
Start: 2018-02-20 | End: 2018-02-20 | Stop reason: HOSPADM

## 2018-02-20 RX ORDER — ASPIRIN 81 MG/1
81 TABLET ORAL DAILY
Status: DISCONTINUED | OUTPATIENT
Start: 2018-02-20 | End: 2018-02-23 | Stop reason: HOSPADM

## 2018-02-20 RX ORDER — MAGNESIUM SULFATE HEPTAHYDRATE 40 MG/ML
4 INJECTION, SOLUTION INTRAVENOUS AS NEEDED
Status: DISCONTINUED | OUTPATIENT
Start: 2018-02-20 | End: 2018-02-23 | Stop reason: HOSPADM

## 2018-02-20 RX ORDER — LIDOCAINE HYDROCHLORIDE 20 MG/ML
INJECTION, SOLUTION EPIDURAL; INFILTRATION; INTRACAUDAL; PERINEURAL AS NEEDED
Status: DISCONTINUED | OUTPATIENT
Start: 2018-02-20 | End: 2018-02-20 | Stop reason: SURG

## 2018-02-20 RX ORDER — IPRATROPIUM BROMIDE AND ALBUTEROL SULFATE 2.5; .5 MG/3ML; MG/3ML
3 SOLUTION RESPIRATORY (INHALATION) ONCE AS NEEDED
Status: DISCONTINUED | OUTPATIENT
Start: 2018-02-20 | End: 2018-02-20 | Stop reason: HOSPADM

## 2018-02-20 RX ORDER — ONDANSETRON 2 MG/ML
4 INJECTION INTRAMUSCULAR; INTRAVENOUS ONCE AS NEEDED
Status: DISCONTINUED | OUTPATIENT
Start: 2018-02-20 | End: 2018-02-20 | Stop reason: HOSPADM

## 2018-02-20 RX ORDER — AMITRIPTYLINE HYDROCHLORIDE 10 MG/1
20 TABLET, FILM COATED ORAL NIGHTLY
Status: DISCONTINUED | OUTPATIENT
Start: 2018-02-20 | End: 2018-02-23 | Stop reason: HOSPADM

## 2018-02-20 RX ORDER — HYDROCODONE BITARTRATE AND ACETAMINOPHEN 7.5; 325 MG/1; MG/1
2 TABLET ORAL EVERY 4 HOURS PRN
Status: DISCONTINUED | OUTPATIENT
Start: 2018-02-20 | End: 2018-02-23 | Stop reason: HOSPADM

## 2018-02-20 RX ORDER — CELECOXIB 200 MG/1
200 CAPSULE ORAL EVERY 12 HOURS SCHEDULED
Status: CANCELLED | OUTPATIENT
Start: 2018-02-20

## 2018-02-20 RX ORDER — BACLOFEN 10 MG/1
10 TABLET ORAL 3 TIMES DAILY
Status: DISCONTINUED | OUTPATIENT
Start: 2018-02-20 | End: 2018-02-23 | Stop reason: HOSPADM

## 2018-02-20 RX ORDER — SODIUM CHLORIDE, SODIUM LACTATE, POTASSIUM CHLORIDE, CALCIUM CHLORIDE 600; 310; 30; 20 MG/100ML; MG/100ML; MG/100ML; MG/100ML
125 INJECTION, SOLUTION INTRAVENOUS CONTINUOUS
Status: DISCONTINUED | OUTPATIENT
Start: 2018-02-20 | End: 2018-02-20

## 2018-02-20 RX ORDER — NALOXONE HCL 0.4 MG/ML
0.1 VIAL (ML) INJECTION
Status: DISCONTINUED | OUTPATIENT
Start: 2018-02-20 | End: 2018-02-23 | Stop reason: HOSPADM

## 2018-02-20 RX ORDER — ONDANSETRON 4 MG/1
4 TABLET, ORALLY DISINTEGRATING ORAL EVERY 6 HOURS PRN
Status: DISCONTINUED | OUTPATIENT
Start: 2018-02-20 | End: 2018-02-23 | Stop reason: HOSPADM

## 2018-02-20 RX ORDER — DOCUSATE SODIUM 100 MG/1
100 CAPSULE, LIQUID FILLED ORAL 2 TIMES DAILY PRN
Status: DISCONTINUED | OUTPATIENT
Start: 2018-02-20 | End: 2018-02-23 | Stop reason: HOSPADM

## 2018-02-20 RX ORDER — FENTANYL CITRATE 50 UG/ML
INJECTION, SOLUTION INTRAMUSCULAR; INTRAVENOUS AS NEEDED
Status: DISCONTINUED | OUTPATIENT
Start: 2018-02-20 | End: 2018-02-20 | Stop reason: SURG

## 2018-02-20 RX ORDER — SODIUM CHLORIDE, SODIUM LACTATE, POTASSIUM CHLORIDE, CALCIUM CHLORIDE 600; 310; 30; 20 MG/100ML; MG/100ML; MG/100ML; MG/100ML
60 INJECTION, SOLUTION INTRAVENOUS CONTINUOUS
Status: DISCONTINUED | OUTPATIENT
Start: 2018-02-20 | End: 2018-02-21

## 2018-02-20 RX ORDER — CELECOXIB 200 MG/1
200 CAPSULE ORAL 2 TIMES DAILY
COMMUNITY
End: 2018-02-23 | Stop reason: HOSPADM

## 2018-02-20 RX ORDER — BUSPIRONE HYDROCHLORIDE 10 MG/1
10 TABLET ORAL 4 TIMES DAILY
Status: DISCONTINUED | OUTPATIENT
Start: 2018-02-20 | End: 2018-02-23 | Stop reason: HOSPADM

## 2018-02-20 RX ORDER — CELECOXIB 200 MG/1
200 CAPSULE ORAL 2 TIMES DAILY
Status: DISCONTINUED | OUTPATIENT
Start: 2018-02-20 | End: 2018-02-23 | Stop reason: HOSPADM

## 2018-02-20 RX ORDER — FUROSEMIDE 40 MG/1
40 TABLET ORAL DAILY
Status: DISCONTINUED | OUTPATIENT
Start: 2018-02-20 | End: 2018-02-23 | Stop reason: HOSPADM

## 2018-02-20 RX ORDER — FENTANYL CITRATE 50 UG/ML
50 INJECTION, SOLUTION INTRAMUSCULAR; INTRAVENOUS
Status: DISCONTINUED | OUTPATIENT
Start: 2018-02-20 | End: 2018-02-20 | Stop reason: HOSPADM

## 2018-02-20 RX ORDER — NIFEDIPINE 30 MG/1
30 TABLET, FILM COATED, EXTENDED RELEASE ORAL
Status: DISCONTINUED | OUTPATIENT
Start: 2018-02-20 | End: 2018-02-23 | Stop reason: HOSPADM

## 2018-02-20 RX ORDER — MEPERIDINE HYDROCHLORIDE 25 MG/ML
12.5 INJECTION INTRAMUSCULAR; INTRAVENOUS; SUBCUTANEOUS
Status: DISCONTINUED | OUTPATIENT
Start: 2018-02-20 | End: 2018-02-20 | Stop reason: HOSPADM

## 2018-02-20 RX ORDER — PANTOPRAZOLE SODIUM 40 MG/1
40 TABLET, DELAYED RELEASE ORAL EVERY MORNING
Status: DISCONTINUED | OUTPATIENT
Start: 2018-02-21 | End: 2018-02-23 | Stop reason: HOSPADM

## 2018-02-20 RX ORDER — POTASSIUM CHLORIDE 750 MG/1
40 TABLET, FILM COATED, EXTENDED RELEASE ORAL AS NEEDED
Status: DISCONTINUED | OUTPATIENT
Start: 2018-02-20 | End: 2018-02-23 | Stop reason: HOSPADM

## 2018-02-20 RX ADMIN — HYDROMORPHONE HYDROCHLORIDE 0.5 MG: 1 INJECTION, SOLUTION INTRAMUSCULAR; INTRAVENOUS; SUBCUTANEOUS at 11:17

## 2018-02-20 RX ADMIN — FUROSEMIDE 40 MG: 40 TABLET ORAL at 14:13

## 2018-02-20 RX ADMIN — FENTANYL CITRATE 50 MCG: 50 INJECTION INTRAMUSCULAR; INTRAVENOUS at 09:10

## 2018-02-20 RX ADMIN — BUSPIRONE HYDROCHLORIDE 10 MG: 10 TABLET ORAL at 23:04

## 2018-02-20 RX ADMIN — SODIUM CHLORIDE, POTASSIUM CHLORIDE, SODIUM LACTATE AND CALCIUM CHLORIDE 125 ML/HR: 600; 310; 30; 20 INJECTION, SOLUTION INTRAVENOUS at 06:57

## 2018-02-20 RX ADMIN — BUSPIRONE HYDROCHLORIDE 10 MG: 10 TABLET ORAL at 18:09

## 2018-02-20 RX ADMIN — FENTANYL CITRATE 100 MCG: 50 INJECTION INTRAMUSCULAR; INTRAVENOUS at 07:38

## 2018-02-20 RX ADMIN — VANCOMYCIN HYDROCHLORIDE 750 MG: 5 INJECTION, POWDER, LYOPHILIZED, FOR SOLUTION INTRAVENOUS at 23:03

## 2018-02-20 RX ADMIN — SODIUM CHLORIDE 567 MG: 9 INJECTION, SOLUTION INTRAVENOUS at 07:20

## 2018-02-20 RX ADMIN — SODIUM CHLORIDE, POTASSIUM CHLORIDE, SODIUM LACTATE AND CALCIUM CHLORIDE 60 ML/HR: 600; 310; 30; 20 INJECTION, SOLUTION INTRAVENOUS at 23:03

## 2018-02-20 RX ADMIN — GABAPENTIN 600 MG: 300 CAPSULE ORAL at 22:59

## 2018-02-20 RX ADMIN — AMITRIPTYLINE HYDROCHLORIDE 20 MG: 10 TABLET, FILM COATED ORAL at 23:04

## 2018-02-20 RX ADMIN — FENTANYL CITRATE 50 MCG: 50 INJECTION INTRAMUSCULAR; INTRAVENOUS at 10:45

## 2018-02-20 RX ADMIN — EPHEDRINE SULFATE 10 MG: 50 INJECTION INTRAMUSCULAR; INTRAVENOUS; SUBCUTANEOUS at 10:15

## 2018-02-20 RX ADMIN — PROPOFOL 120 MG: 10 INJECTION, EMULSION INTRAVENOUS at 07:38

## 2018-02-20 RX ADMIN — BACLOFEN 10 MG: 10 TABLET ORAL at 23:05

## 2018-02-20 RX ADMIN — CELECOXIB 200 MG: 200 CAPSULE ORAL at 23:06

## 2018-02-20 RX ADMIN — FLUTICASONE PROPIONATE 1 SPRAY: 50 SPRAY, METERED NASAL at 14:14

## 2018-02-20 RX ADMIN — HYDROCODONE BITARTRATE AND ACETAMINOPHEN 2 TABLET: 7.5; 325 TABLET ORAL at 18:08

## 2018-02-20 RX ADMIN — HYDROCODONE BITARTRATE AND ACETAMINOPHEN 2 TABLET: 7.5; 325 TABLET ORAL at 21:51

## 2018-02-20 RX ADMIN — EPHEDRINE SULFATE 10 MG: 50 INJECTION INTRAMUSCULAR; INTRAVENOUS; SUBCUTANEOUS at 09:11

## 2018-02-20 RX ADMIN — SODIUM CHLORIDE 567 MG: 9 INJECTION, SOLUTION INTRAVENOUS at 10:14

## 2018-02-20 RX ADMIN — FENTANYL CITRATE 50 MCG: 50 INJECTION INTRAMUSCULAR; INTRAVENOUS at 10:24

## 2018-02-20 RX ADMIN — FENTANYL CITRATE 50 MCG: 50 INJECTION INTRAMUSCULAR; INTRAVENOUS at 08:10

## 2018-02-20 RX ADMIN — GABAPENTIN 600 MG: 300 CAPSULE ORAL at 14:13

## 2018-02-20 RX ADMIN — Medication 1 TABLET: at 14:14

## 2018-02-20 RX ADMIN — POTASSIUM CHLORIDE 40 MEQ: 1500 TABLET, EXTENDED RELEASE ORAL at 15:30

## 2018-02-20 RX ADMIN — POTASSIUM CHLORIDE 40 MEQ: 1500 TABLET, EXTENDED RELEASE ORAL at 18:09

## 2018-02-20 RX ADMIN — HYDROMORPHONE HYDROCHLORIDE 0.5 MG: 1 INJECTION, SOLUTION INTRAMUSCULAR; INTRAVENOUS; SUBCUTANEOUS at 11:10

## 2018-02-20 RX ADMIN — DEXAMETHASONE SODIUM PHOSPHATE 8 MG: 4 INJECTION, SOLUTION INTRAMUSCULAR; INTRAVENOUS at 08:04

## 2018-02-20 RX ADMIN — BACLOFEN 10 MG: 10 TABLET ORAL at 14:14

## 2018-02-20 RX ADMIN — ASPIRIN 81 MG: 81 TABLET ORAL at 14:14

## 2018-02-20 RX ADMIN — CELECOXIB 200 MG: 200 CAPSULE ORAL at 15:29

## 2018-02-20 RX ADMIN — VANCOMYCIN HYDROCHLORIDE 750 MG: 5 INJECTION, POWDER, LYOPHILIZED, FOR SOLUTION INTRAVENOUS at 07:34

## 2018-02-20 RX ADMIN — FENTANYL CITRATE 50 MCG: 50 INJECTION INTRAMUSCULAR; INTRAVENOUS at 09:46

## 2018-02-20 RX ADMIN — ONDANSETRON 4 MG: 2 INJECTION, SOLUTION INTRAMUSCULAR; INTRAVENOUS at 08:04

## 2018-02-20 RX ADMIN — LIDOCAINE HYDROCHLORIDE 3 ML: 20 INJECTION, SOLUTION EPIDURAL; INFILTRATION; INTRACAUDAL; PERINEURAL at 07:36

## 2018-02-20 NOTE — PLAN OF CARE
Problem: Patient Care Overview (Adult)  Goal: Discharge Needs Assessment  Outcome: Ongoing (interventions implemented as appropriate)   02/20/18 0648   Discharge Needs Assessment   Concerns To Be Addressed no discharge needs identified   Readmission Within The Last 30 Days no previous admission in last 30 days   Equipment Needed After Discharge walker, rolling   Discharge Disposition rehab facility   Self-Care   Equipment Currently Used at Home walker, rolling;cane, straight

## 2018-02-20 NOTE — PLAN OF CARE
Problem: Inpatient Physical Therapy  Goal: Transfer Training Goal 1 LTG- PT   02/20/18 1521   Transfer Training PT LTG   Transfer Training PT LTG, Date Established 02/20/18   Transfer Training PT LTG, Time to Achieve by discharge   Transfer Training PT LTG, Activity Type bed to chair /chair to bed;sit to stand/stand to sit   Transfer Training PT LTG, [place] Level contact guard assist;minimum assist (75% patient effort)   Transfer Training PT LTG, Assist Device other (see comments)  (with appropriate AD)     Goal: Gait Training Goal LTG- PT   02/20/18 1521   Gait Training PT LTG   Gait Training Goal PT LTG, Date Established 02/20/18   Gait Training Goal PT LTG, Time to Achieve by discharge   Gait Training Goal PT LTG, [place] Level minimum assist (75% patient effort);contact guard assist   Gait Training Goal PT LTG, Assist Device other (see comments)  (with appropriate AD)   Gait Training Goal PT LTG, Distance to Achieve 40

## 2018-02-20 NOTE — BRIEF OP NOTE
TOTAL HIP ARTHROPLASTY  Progress Note    Lisa Calloway  2/20/2018    Pre-op Diagnosis:   Right hip pain [M25.551]  Pre-op testing [Z01.818]  Primary osteoarthritis of right hip [M16.11]       Post-Op Diagnosis Codes:     * Right hip pain [M25.551]     * Pre-op testing [Z01.818]     * Primary osteoarthritis of right hip [M16.11]    Procedure/CPT® Codes:      Procedure(s):  TOTAL HIP ARTHROPLASTY  RIGHT  Surgeon(s):  Oswaldo Fisher MD    Anesthesia: General     Staff:   Circulator: Babar Robert RN; Jose J Garcia RN  Scrub Person: Nkechi Nicholson  Vendor Representative: Pedro Darden  Assistant: Veronica Lua    Estimated Blood Loss: 100 mL    Urine Voided: * No values recorded between 2/20/2018  7:33 AM and 2/20/2018 10:25 AM *    Specimens:                      Drains:           Findings:     Complications:       Oswaldo Fisher MD     Date: 2/20/2018  Time: 10:28 AM

## 2018-02-20 NOTE — OP NOTE
DATE OF SURGERY: 02/20/18    PREOPERATIVE DIAGNOSIS: Osteoarthritis right hip     POSTOPERATIVE DIAGNOSIS: Same     OPERATIONS PERFORMED: Right total hip arthroplasty     SURGEON: Oswaldo Fisher MD      ASSISTANT: Veronica Lua     ANESTHESIA: Gen.     ESTIMATED BLOOD LOSS: 100 cc     ANTIBIOTICS: Vancomycin     DESCRIPTION OF PROCEDURE: With the patient in the operating theater general anesthesia administered she was positioned left lateral decubitus secured with pegboard.  She received 1 g of TXA and 1 g of vancomycin immediately preoperatively.  With the right hip and leg sterilely prepped and draped in usual fashion hip was approached through a posterior incision.  The skin divided tensor fascia darvin and external fascia gluteus sammy were split.  The posterior aspect of the hip was exposed the short external rotators were identified and released the capsule divided in a T fashion and the sciatic nerve protected throughout the procedure.  No head was dislocated and then the femoral neck osteotomized.  Soft tissue was removed from within the acetabulum.  The acetabulum was then progressively reamed beginning with 44 millimeter reamer.  The reamer was placed in 45° of flexion 20° of anteversion.  With cancellus bone exposed acetabular cup was positioned and impacted into place.  The 10° posterior lip liner was positioned.  The femoral neck was re-osteotomized at the appropriate angle.  The proximal femur was then progressively broached after canal finder was positioned.  With 10 mm Greco in place hip was reduced.  The -2 neck length was appropriate and selected.  With trial femoral stem and head and reduced the hip was appropriate length with good stability.  Trial components were removed.  The femoral implant was impacted into place with 20° of anteversion.  Again after trial femoral head was confirmed to be appropriate the femoral head was impacted into place.  It was dry at the completion of the procedure.   It was copiously irrigated the capsule was repaired with #2 Ethibond with adequate stability obtained posteriorly.  The tensor fascia darvin was reapproximated along with gluteus sammy then with running #1 strata fix the fascia was closed.  Soft tissue was reapproximated and then skin final closure was with running 20 strata fix.  Dermabond dressing was applied sterile dressing was applied she was positioned supine transferred to her bed and abduction pillow was positioned.  She was taken to recovery room in stable condition.       Dictator Signature:___________________________  Oswaldo Fisher M.D.

## 2018-02-20 NOTE — H&P (VIEW-ONLY)
Encounter Date:01/26/2018    Patient ID: Lisa Calloway is a 77 y.o. female who resides in [place], [place].    CC/Reason for visit:  Chronic diastolic heart failure            Lisa Calloway returns to my office today in follow up of her chronic diastolic heart failure and hypertension. Since last visit, patient has been feeling well overall from a cardiovascular standpoint. She states that can barely walk and is going February 20th to get it replaced with Dr. Fisher. She has been experiencing significant lower extremity edema. Patient has also been struggling with advanced arthritis. She reports chronic weakness with anything she does. She also gets short of breath with exertion. She believes a lot of her symptoms are due to anxiety.    Review of Systems   Constitution: Positive for diaphoresis, weakness, malaise/fatigue and night sweats.   Eyes: Negative for vision loss in left eye and vision loss in right eye.   Cardiovascular: Positive for leg swelling and palpitations. Negative for chest pain, dyspnea on exertion, near-syncope, orthopnea, paroxysmal nocturnal dyspnea and syncope.   Respiratory: Positive for shortness of breath.    Endocrine: Positive for heat intolerance.   Hematologic/Lymphatic: Bruises/bleeds easily.   Musculoskeletal: Positive for arthritis, joint pain, joint swelling and muscle weakness. Negative for myalgias.   Gastrointestinal: Positive for bloating and heartburn.   Neurological: Negative for brief paralysis, excessive daytime sleepiness, focal weakness, numbness and paresthesias.   Psychiatric/Behavioral: The patient is nervous/anxious.    Allergic/Immunologic:        Food allergies    All other systems reviewed and are negative.      The patient's past medical, social, family history and ROS reviewed in the patient's electronic medical record.    Allergies  Amlodipine; Beta adrenergic blockers; Ergotamine-caffeine; Lisinopril; Metoprolol; Other; Tenormin [atenolol]; Trovan  [trovafloxacin]; Atorvastatin; Cefprozil; Codeine; Contrast dye; Coreg [carvedilol]; Fish-derived products; Penicillins; Sectral [acebutolol hcl]; Sulfa antibiotics; and Tetracycline    Outpatient Prescriptions Marked as Taking for the 1/26/18 encounter (Office Visit) with Flako Lam IV, MD   Medication Sig Dispense Refill   • amitriptyline (ELAVIL) 10 MG tablet Take 20 mg by mouth At Night As Needed.     • aspirin 81 MG tablet Take 1 tablet by mouth daily.     • baclofen (LIORESAL) 10 MG tablet Take 1 tablet by mouth 3 (Three) Times a Day.     • busPIRone (BUSPAR) 10 MG tablet Take 10 mg by mouth 4 (Four) Times a Day As Needed.  4   • Calcium-Phosphorus-Vitamin D (CITRACAL +D3 PO) Take 1 tablet by mouth Daily.     • celecoxib (CELEBREX) 200 MG capsule Take 200 mg by mouth 2 (Two) Times a Day.     • fluticasone (FLONASE) 50 MCG/ACT nasal spray 1 spray into each nostril Daily.  1   • furosemide (LASIX) 40 MG tablet Take 1 tablet by mouth Daily for 180 days. 90 tablet 1   • gabapentin (NEURONTIN) 600 MG tablet Take 600 mg by mouth 3 (Three) Times a Day.     • loratadine (CLARITIN) 10 MG tablet Take 10 mg by mouth Daily.  1   • Multiple Vitamin (MULTI VITAMIN DAILY) tablet Take 1 tablet by mouth Daily.     • Multiple Vitamins-Minerals (HAIR SKIN AND NAILS FORMULA PO) Take 1 tablet by mouth Daily.     • NIFEdipine XL (PROCARDIA XL) 30 MG 24 hr tablet Take 30 mg by mouth 3 (Three) Times a Day.     • omeprazole (PriLOSEC) 20 MG capsule Take 20 mg by mouth 2 (Two) Times a Day.     • potassium chloride (MICRO-K) 10 MEQ CR capsule Take 1 tablet by mouth 3 (Three) Times a Day.     • PROAIR  (90 BASE) MCG/ACT inhaler Inhale 2 puffs Every 6 (Six) Hours As Needed.     • traMADol (ULTRAM) 50 MG tablet Take 50 mg by mouth Every 6 (Six) Hours As Needed for Moderate Pain .     • [DISCONTINUED] furosemide (LASIX) 20 MG tablet Take 20 mg by mouth Daily As Needed.     • [DISCONTINUED] furosemide (LASIX) 40 MG  "tablet Take 1 tablet by mouth Daily As Needed (swelling). 90 tablet 1   • [DISCONTINUED] hydrochlorothiazide (MICROZIDE) 12.5 MG capsule Take 12.5 mg by mouth Daily.           Blood pressure 160/72, pulse 98, height 152.4 cm (60\"), weight 58.5 kg (129 lb), unknown if currently breastfeeding.  Body mass index is 25.19 kg/(m^2).    Physical Exam   Constitutional: She is oriented to person, place, and time. She appears well-developed and well-nourished.   HENT:   Head: Normocephalic and atraumatic.   Eyes: Pupils are equal, round, and reactive to light. No scleral icterus.   Neck: No JVD present. Carotid bruit is not present. No thyromegaly present.   Cardiovascular: Normal rate, regular rhythm, S1 normal and S2 normal.  Exam reveals no gallop.    Murmur heard.  High-pitched blowing holosystolic murmur is present with a grade of 2/6  at the apex  Pulmonary/Chest: Effort normal and breath sounds normal.   Abdominal: Soft. There is no hepatosplenomegaly. There is no tenderness.   Neurological: She is alert and oriented to person, place, and time.   Skin: Skin is warm and dry. No cyanosis. Nails show no clubbing.   Psychiatric: She has a normal mood and affect. Her behavior is normal.       Data Review:     Lab Results   Component Value Date    WBC 6.95 03/16/2017    HGB 9.3 (L) 03/18/2017    HCT 28.6 (L) 03/18/2017    MCV 90.3 03/16/2017     03/16/2017     Lab Results   Component Value Date    HGBA1C 5.80 (H) 03/16/2017     Lab Results   Component Value Date    GLUCOSE 158 (H) 03/16/2017    BUN 16 03/16/2017    CREATININE 0.70 03/16/2017    EGFRIFNONA 81 03/16/2017    BCR 22.9 03/16/2017    K 3.3 (L) 03/16/2017    CO2 28.0 03/16/2017    CALCIUM 10.1 03/16/2017    ALBUMIN 4.60 03/16/2017    LABIL2 1.6 03/16/2017    AST 33 03/16/2017    ALT 31 03/16/2017     Procedures       Problem List Items Addressed This Visit        Cardiovascular and Mediastinum    Essential hypertension    Overview     · Renal angiography " (5/23/2012): normal bilateral renal arteries.          Current Assessment & Plan     Blood pressure remains elevated today. She's had historical intolerance to multiple medications to treat her blood pressure and unfortunately I believe this is causing some of her diastolic heart failure symptoms. I will intensify her diuretic therapy today in hopes of removing her heart failure symptoms and reducing her edema.         Relevant Medications    furosemide (LASIX) 40 MG tablet    Chronic diastolic heart failure - Primary    Overview     · Echo (8/10/2011): LVH. LVEF 70%.  No significant valvular abnormality.   · Pharmacologic nuclear stress (7/28/2011): No ischemia. LVEF 80%.  · Recurrent chest pain/CHF presentations to [facility].  · Cardiac catheterization (5/23/2012):  Normal coronary arteries, LVEF 65%, LVEDP 32 mmHg.  · Echocardiogram for worsening dyspnea (3/18/2016): LVEF >65%. No significant valvular abnormalities. RVSP 43 mmHg.   · GXT (3/18/2016): Exercised for 3 minutes (6 minutes expected). No ischemic changes. Normal oxygen saturation.          Current Assessment & Plan     · Functional class III heart failure symptoms which are stable  · We will intensify her diuretic therapy by increasing furosemide dosing to 40 mg daily and discontinuing hydrochlorothiazide.         Relevant Medications    furosemide (LASIX) 40 MG tablet    Other Relevant Orders    CBC (No Diff)    Basic Metabolic Panel    BNP    Basic Metabolic Panel       Other    Localized edema    Current Assessment & Plan     Bilateral lower extremity edema multifactorial and due to vascular insufficiency, nifedipine use, and diastolic heart failure.                    · Discontinue hydrochlorothiazide  · Increase furosemide to 40 mg daily  · BMP today and in 2 weeks  · Proceed with right total hip arthroplasty with no further cardiac testing  · Return in about 6 months (around 7/26/2018).      Flako Lam IV,  MD  1/26/2018     Scribed for Flako Lam IV, MD by Jacobo Horn. 1/26/2018  2:17 PM

## 2018-02-20 NOTE — PLAN OF CARE
Problem: Patient Care Overview (Adult)  Goal: Plan of Care Review  Outcome: Ongoing (interventions implemented as appropriate)   02/20/18 0648   Coping/Psychosocial Response Interventions   Plan Of Care Reviewed With patient   Patient Care Overview   Progress progress toward functional goals as expected

## 2018-02-20 NOTE — CONSULTS
Inpatient Consult to Hospitalist  Consult performed by: STEF JUAN  Consult ordered by: NOAH FISHER          Patient Identification:  Name:  Lisa Calloway  Age:  78 y.o.  Sex:  female  :  1940  MRN:  [id number]  Visit Number:  [id number]  Primary care provider:  Tobin Acosta MD    History of presenting illness:    Lisa Calloway is a 78-year-old  female that was admitted to the hospital earlier today under the care of orthopedic surgery following right total hip arthroplasty performed by Dr. Fulton. She has known history of COPD reportedly from 2nd hand smoke exposure, diastolic CHF followed by Dr. Rashad Lam, hypertension, osteoarthritis, and history of MRSA. The hospitalist service was consulted for medical management.  Mrs. Calloway is very pleasant post-operatively.  She denies chest pain, shortness of breath, and palpitations.  She denies dysuria and hematuria.  She denies abdominal pain, nausea. She reports she is currently comfortably post-operatively.  Her home medications reveal Procardia XL 30mg.  It appears she is taking this 3x a day at home, but reports she does frequently hold it due to low blood pressures.       I was accompanied by JAGDEEP Weston during my encounter. Pt currently sitting in bedside chair. She had just finished working with PT. Denies any current complaints. Denies CP, dyspnea or palpitations. Denies nausea or vomiting.   ---------------------------------------------------------------------------------------------------------------------  Review of Systems   Constitutional: Negative for chills, diaphoresis and fever.   HENT: Negative for congestion and drooling.    Eyes: Negative for pain and discharge.   Respiratory: Negative for cough, chest tightness, shortness of breath and wheezing.    Cardiovascular: Negative for chest pain and leg swelling.   Gastrointestinal: Negative for abdominal distention and abdominal pain.   Endocrine: Negative  for cold intolerance and heat intolerance.   Genitourinary: Negative for difficulty urinating and dysuria.   Musculoskeletal: Negative for arthralgias and gait problem.   Skin: Negative for color change and pallor.   Neurological: Negative for dizziness and headaches.   Psychiatric/Behavioral: Negative for agitation and confusion.      ---------------------------------------------------------------------------------------------------------------------   Past History:  Family History   Problem Relation Age of Onset   • Congenital heart disease Mother    • Hypertension Mother    • Stroke Mother    • Cancer Other    • Gout Other      Past Medical History:   Diagnosis Date   • Anxiety    • Arthritis    • Calcinosis-Raynaud's sclerodactyly-telangiectasia syndrome    • COPD (chronic obstructive pulmonary disease)    • Diastolic heart failure 12/9/2016    Echocardiogram, 08/10/2011: LVH, LVEF 70% and no significant valvular abnormality.  Pharmacologic MPS, 07/28/2011: normal, LVEF 80%. Recurrent chest pain/CHF presentations to the [place], [place] area. Cardiac catheterization, 05/23/2012: Normal coronary arteries, LVEF 65%, LVEDP 32 mmHg. Renal angiography, 05/23/2012: normal bilateral renal arteries.  Echocardiogram for worsening dyspnea, 03/ • GERD (gastroesophageal reflux disease)    • Heart murmur    • Hiatal hernia    • High cholesterol    • History of recurrent UTIs    • History of staph infection     after hip replacement   • History of stomach ulcers    • History of transfusion    • Hypertension    • Low back pain    • Lumbar herniated disc     with right-sided sciatica:Status post lumbar fusion, 01/17/2013.   • Migraine    • Panic attacks    • SOB (shortness of breath)    • Wears dentures     upper denture and partial   • Wears eyeglasses      Past Surgical History:   Procedure Laterality Date   • ABDOMINAL SURGERY     • APPENDECTOMY  1975   • BACK SURGERY      lumbar fusion   • BREAST SURGERY      Biopsy    • CARDIAC SURGERY      cardiac cath   • CHOLECYSTECTOMY  1996   • EYE SURGERY      cataract surgery   • HIP SURGERY     • HYSTERECTOMY  1999   • JOINT REPLACEMENT      hip left   • LUMBAR LAMINECTOMY WITH FUSION N/A 3/17/2017    Procedure: LUMBAR FUSION DECOMPRESSON WITH PEDICLE SCREWS L3-4 ;  Surgeon: Sen Skinner MD;  Location: Counts include [facility] beds at the [facility] OR;  Service:    • OTHER SURGICAL HISTORY      arthrodesis lumbar   • OTHER SURGICAL HISTORY  1982    neuroplasty decompression median nerve at carpal tunnel   • TONSILLECTOMY  1963   • TUBAL ABDOMINAL LIGATION  1975     Social History     Social History   • Marital status:      Spouse name: N/A   • Number of children: N/A   • Years of education: N/A     Social History Main Topics   • Smoking status: Never Smoker   • Smokeless tobacco: Never Used   • Alcohol use No   • Drug use: No   • Sexual activity: Defer     Other Topics Concern   • None     Social History Narrative     ---------------------------------------------------------------------------------------------------------------------   Allergies:  Amlodipine; Beta adrenergic blockers; Ergotamine-caffeine; Lisinopril; Metoprolol; Tenormin [atenolol]; Trovan [trovafloxacin]; Atorvastatin; Cefprozil; Codeine; Contrast dye; Coreg [carvedilol]; Fish-derived products; Penicillins; Sectral [acebutolol hcl]; Sulfa antibiotics; and Tetracycline  ---------------------------------------------------------------------------------------------------------------------   Prior to Admission Medications     Prescriptions Last Dose Informant Patient Reported? Taking?    acetaminophen (TYLENOL) 500 MG tablet 02/20/2018 Self Yes No    Take 500 mg by mouth Every 6 (Six) Hours As Needed for Mild Pain .    amitriptyline (ELAVIL) 10 MG tablet 02/19/2018 Self Yes No    Take 20 mg by mouth Every Night.    aspirin 81 MG tablet 02/09/2018 Self Yes No    Take 1 tablet by mouth Daily. Prior to [facility] Admission, Patient was on: on hold since  saturday    baclofen (LIORESAL) 10 MG tablet 02/19/2018 Self Yes No    Take 1 tablet by mouth 3 (Three) Times a Day.    busPIRone (BUSPAR) 10 MG tablet 02/20/2018 Self Yes No    Take 10 mg by mouth 4 (Four) Times a Day.    Calcium-Phosphorus-Vitamin D (CITRACAL +D3 PO) 02/19/2018 Self Yes No    Take 1 tablet by mouth 2 (Two) Times a Day.    fluticasone (FLONASE) 50 MCG/ACT nasal spray 02/19/2018 Self Yes No    1 spray into each nostril Daily.    furosemide (LASIX) 40 MG tablet 02/19/2018 Self No No    Take 1 tablet by mouth Daily for 180 days.    gabapentin (NEURONTIN) 600 MG tablet 02/20/2018 Self Yes No    Take 600 mg by mouth 3 (Three) Times a Day.    loratadine (CLARITIN) 10 MG tablet 02/20/2018 Self Yes No    Take 10 mg by mouth Daily.    Multiple Vitamin (MULTI VITAMIN DAILY) tablet 02/19/2018 Self Yes No    Take 1 tablet by mouth Daily.    NIFEdipine XL (PROCARDIA XL) 30 MG 24 hr tablet 02/20/2018 Self Yes No    Take 30 mg by mouth 3 (Three) Times a Day.    omeprazole (PriLOSEC) 20 MG capsule 02/20/2018 Self Yes No    Take 20 mg by mouth 2 (Two) Times a Day.    potassium chloride (K-DUR,KLOR-CON) 10 MEQ CR tablet 02/19/2018 Self Yes No    Take 10 mEq by mouth 3 (Three) Times a Day.    PROAIR  (90 BASE) MCG/ACT inhaler 02/20/2018 Self Yes No    Inhale 2 puffs Every 4 (Four) Hours As Needed for Wheezing.    traMADol (ULTRAM) 50 MG tablet 02/20/2018 Self Yes No    Take 50 mg by mouth Every 6 (Six) Hours As Needed for Moderate Pain .        Hospital Meds:    amitriptyline 20 mg Oral Nightly   [START ON 2/21/2018] apixaban 2.5 mg Oral Q12H   aspirin 81 mg Oral Daily   bacitracin + polymyxin in normal saline 3000 mL IRRIGATION  Irrigation Once   baclofen 10 mg Oral TID   busPIRone 10 mg Oral 4x Daily   calcium carb-cholecalciferol 1 tablet Oral Daily   [START ON 2/21/2018] cetirizine 5 mg Oral Daily   fluticasone 1 spray Each Nare Daily   furosemide 40 mg Oral Daily   gabapentin 600 mg Oral Q8H    ipratropium-albuterol 3 mL Nebulization 4x Daily - RT   multivitamin 1 tablet Oral Daily   NIFEdipine CC 30 mg Oral Q24H   [START ON 2/21/2018] pantoprazole 40 mg Oral QAM   vancomycin 15 mg/kg Intravenous Once       lactated ringers 125 mL/hr Last Rate: 125 mL/hr (02/20/18 0657)   lactated ringers 100 mL/hr      ---------------------------------------------------------------------------------------------------------------------   Vital Signs:  Temp:  [97.4 °F (36.3 °C)-98.5 °F (36.9 °C)] 97.7 °F (36.5 °C)  Heart Rate:  [85-93] 90  Resp:  [16-20] 20  BP: (120-158)/(53-77) 134/68  Last 3 weights    02/20/18  0636   Weight: 56.7 kg (125 lb)     Body mass index is 24.41 kg/(m^2).  ---------------------------------------------------------------------------------------------------------------------   Physical exam:  Constitutional:  Currently resting comfortably in no acute distress.   HENT:  Head: Normocephalic and atraumatic.  Mouth:  Moist mucous membranes.    Eyes:  Conjunctivae and EOM are normal.  Pupils are equal, round, and reactive to light.  No scleral icterus.    Neck:  Neck supple.  No JVD present.    Cardiovascular:  Normal rate, regular rhythm. Normal s1/s2.  (+) JELENA.  Pulmonary/Chest:  No respiratory distress, no wheezes, no crackles, with normal breath sounds and good air movement.  Abdominal:  Soft.  Bowel sounds are present x 4.  No distension and no tenderness.   Musculoskeletal: No edema.   Neurological:  Alert and oriented to person, place, and time. CN's II-XII grossly intact.  No tongue deviation.  No facial droop.  No slurred speech.   Skin:  Skin is warm and dry. No rash noted.  No pallor.    ---------------------------------------------------------------------------------------------------------------------   EKG:      ---------------------------------------------------------------------------------------------------------------------     Results from last 7 days  Lab Units 02/20/18  2252  02/14/18  0946   WBC 10*3/mm3 11.99 8.00   HEMOGLOBIN g/dL 10.5* 12.4   HEMATOCRIT % 32.8* 38.5   MCV fL 89.9 89.7   MCHC g/dL 32.0* 32.2*   PLATELETS 10*3/mm3 298 332           Results from last 7 days  Lab Units 02/14/18  0946   SODIUM mmol/L 140   POTASSIUM mmol/L 3.3*   CHLORIDE mmol/L 103   CO2 mmol/L 22.3*   BUN mg/dL 20   CREATININE mg/dL 0.90   EGFR IF NONAFRICN AM mL/min/1.73 60*   CALCIUM mg/dL 9.4   GLUCOSE mg/dL 116*   Estimated Creatinine Clearance: 40 mL/min (by C-G formula based on Cr of 0.9).  No results found for: AMMONIA          Lab Results   Component Value Date    HGBA1C 5.80 (H) 03/16/2017     No results found for: TSH, FREET4  No results found for: PREGTESTUR, PREGSERUM, HCG, HCGQUANT  Pain Management Panel     There is no flowsheet data to display.                        ---------------------------------------------------------------------------------------------------------------------   Imaging Results (last 7 days)     Procedure Component Value Units Date/Time    XR Hip With or Without Pelvis 1 View Right [[id number]] Collected:  02/20/18 1107     Updated:  02/20/18 1111    Narrative:       EXAMINATION: XR HIP W OR WO PELVIS 2-3 VIEW RIGHT-      CLINICAL INDICATION:Postoperative; M25.551-Pain in right hip;  M16.11-Unilateral primary osteoarthritis, right hip; Z01.818-Encounter  for other preprocedural examination        COMPARISON: None      TECHNIQUE: Single view right hip     FINDINGS:   Status post right hip arthroplasty. Anatomic alignment. No radiographic  evidence of hardware complication.           Impression:       Status post right hip arthroplasty without complicating feature  identified.     This report was finalized on 2/20/2018 11:07 AM by Dr. Christian Regan MD.             I have personally reviewed the radiology images and read the final radiology report.  ---------------------------------------------------------------------------------------------------------------------      Assessment and Plan:      -Right hip OA s/p right ANUPAMA: Cont care per primary team. PT/OT. Pain control.      -Essential HTN: BP currently stable. Hold parameters placed on Procardial XL.  Reduced dose from 30 mg of XL Procardia TID to one dose daily for now with holding parameters.  Hold parameters placed on home Lasix.     -Hypokalemia: Check Mg. Start electrolyte replacement protocols and repeat labs in the AM.     -Chronic diastolic CHF: Appears compensated at present. Reduce rate of IVF's. Restart home Lasix. Monitor I/O's.    -COPD: Appears stable at present with no acute exacerbation    -DVT prophylaxis:  Eliquis 2.5 mg to start tomorrow per primary. SCDs in place at present.     Thank you for the opportunity to participate in the care of your patient. Please do not hesitate to call with any questions or concerns.     Nicol Nolen PA-C  02/20/18  1:52 PM  ---------------------------------------------------------------------------------------------------------------------     I have reviewed the notes, assessments, and/or procedures performed by Nicol Nolen PA-C. I concur with her/his documentation of Lisa Calloway.    Jonathan Antoine D.O.

## 2018-02-20 NOTE — PLAN OF CARE
Problem: Pain, Acute (Adult)  Goal: Identify Related Risk Factors and Signs and Symptoms  Outcome: Ongoing (interventions implemented as appropriate)    Goal: Acceptable Pain Control/Comfort Level  Outcome: Ongoing (interventions implemented as appropriate)      Problem: Fall Risk (Adult)  Goal: Identify Related Risk Factors and Signs and Symptoms  Outcome: Ongoing (interventions implemented as appropriate)    Goal: Absence of Falls  Outcome: Ongoing (interventions implemented as appropriate)      Problem: Hip Replacement, Total (Adult)  Goal: Signs and Symptoms of Listed Potential Problems Will be Absent or Manageable (Hip Replacement, Total)  Outcome: Ongoing (interventions implemented as appropriate)

## 2018-02-20 NOTE — PROGRESS NOTES
Discharge Planning Assessment   Miky     Patient Name: Lisa Calloway  MRN: [id number]  Today's Date: 2/20/2018    Admit Date: 2/20/2018          Discharge Needs Assessment       02/20/18 1550    Living Environment    Lives With alone    Transportation Available ambulance    Living Environment    Quality Of Family Relationships supportive    Discharge Needs Assessment    Outpatient/Agency/Support Group Needs --   Pt does not utilize home health services.     Community Agency Name(S) --   Pt request short-term rehab at [facility] in [place], [place]. Pt had a right total hip replacement on this date. SS contacted [facility] [phone] per Lovely and faxed referral to [phone].     Equipment Currently Used at Home walker, rolling;cane, straight   [facility]     Discharge Disposition skilled nursing facility            Discharge Plan       02/20/18 2912    Case Management/Social Work Plan    Plan Pt is requesting short-term rehab placement at [facility]. SS faxed referral to [facility]. SS to follow and assist as needed with discharge planning.     Patient/Family In Agreement With Plan yes        Discharge Placement     Facility/Agency Request Status Selected? Address Phone Number Fax Number    [facility] Pending - No Request Sent     [id number] [facility] [address] [phone]                Demographic Summary       02/20/18 1549    Referral Information    Referral Source physician    Reason For Consult --   SS received consult Nursing home placement. SS spoke to pt.     Primary Care Physician Information    Name Dr. Tobin Acosta       Legal       02/20/18 1554    Legal    Legal Comments POA is cousin, Leyda Powers and pt has a living will on file.          Miracle Clark

## 2018-02-20 NOTE — ANESTHESIA POSTPROCEDURE EVALUATION
Patient: Lisa Calloway    Procedure Summary     Date Anesthesia Start Anesthesia Stop Room / Location    02/20/18 0734 1035  [facility] OR 03 / [facility] OR       Procedure Diagnosis Surgeon Provider    TOTAL HIP ARTHROPLASTY (Right Hip) Right hip pain; Pre-op testing; Primary osteoarthritis of right hip  (Right hip pain [M25.551]; Pre-op testing [Z01.818]; Primary osteoarthritis of right hip [M16.11]) MD Radames Britt, DO          Anesthesia Type: general  Last vitals  BP   138/68 (02/20/18 1116)   Temp   97.6 °F (36.4 °C) (02/20/18 1106)   Pulse   89 (02/20/18 1116)   Resp   18 (02/20/18 1116)     SpO2   99 % (02/20/18 1116)     Post Anesthesia Care and Evaluation    Patient location during evaluation: PACU  Patient participation: complete - patient participated  Level of consciousness: awake and alert  Pain score: 1  Pain management: adequate  Airway patency: patent  Anesthetic complications: No anesthetic complications  PONV Status: controlled  Cardiovascular status: acceptable  Respiratory status: acceptable  Hydration status: acceptable

## 2018-02-20 NOTE — ANESTHESIA PROCEDURE NOTES
Airway  Urgency: elective    Date/Time: 2/20/2018 7:40 AM  End Time:2/20/2018 7:40 AM  Airway not difficult    General Information and Staff    Patient location during procedure: OR  Anesthesiologist: BONNIE DEL ROSARIO  CRNA: TOMAS SILVER    Indications and Patient Condition  Indications for airway management: airway protection    Preoxygenated: yes  MILS maintained throughout  Mask difficulty assessment: 0 - not attempted    Final Airway Details  Final airway type: endotracheal airway      Successful airway: ETT  Cuffed: yes   Successful intubation technique: direct laryngoscopy  Endotracheal tube insertion site: oral  Blade: Marsha  Blade size: #3  ETT size: 7.0 mm  Cormack-Lehane Classification: grade IIa - partial view of glottis  Placement verified by: chest auscultation, capnometry and palpation of cuff   Cuff volume (mL): 10  Measured from: lips  ETT to lips (cm): 22  Number of attempts at approach: 1    Additional Comments  Atraumatic

## 2018-02-20 NOTE — PLAN OF CARE
Problem: Patient Care Overview (Adult)  Goal: Discharge Needs Assessment  Outcome: Ongoing (interventions implemented as appropriate)  Discharge Planning Assessment   Miky     Patient Name: Lisa Calloway  MRN: [id number]  Today's Date: 2/20/2018    Admit Date: 2/20/2018          Discharge Needs Assessment       02/20/18 1557    Living Environment    Lives With alone    Transportation Available ambulance    Living Environment    Quality Of Family Relationships supportive    Discharge Needs Assessment    Outpatient/Agency/Support Group Needs --   Pt does not utilize home health services.     Community Agency Name(S) --   Pt request short-term rehab at [facility] in [place], [place]. Pt had a right total hip replacement on this date. SS contacted [facility] [phone] per Lovely and faxed referral to [phone].     Equipment Currently Used at Home walker, rolling;cane, straight   [facility]     Discharge Disposition skilled nursing facility            Discharge Plan       02/20/18 4088    Case Management/Social Work Plan    Plan Pt is requesting short-term rehab placement at [facility]. SS faxed referral to [facility]. SS to follow and assist as needed with discharge planning.     Patient/Family In Agreement With Plan yes        Discharge Placement     Facility/Agency Request Status Selected? Address Phone Number Fax Number    [facility] Pending - No Request Sent     [address], [facility] [address] [phone]                Demographic Summary       02/20/18 1541    Referral Information    Referral Source physician    Reason For Consult --   SS received consult Nursing home placement. SS spoke to pt.     Primary Care Physician Information    Name Dr. Tobin Acosta       Legal       02/20/18 1558    Legal    Legal Comments POA is cousin, Leyda Gio and pt has a living will on file.          Miracle Clark

## 2018-02-20 NOTE — PLAN OF CARE
Problem: Perioperative Period (Adult)  Goal: Signs and Symptoms of Listed Potential Problems Will be Absent or Manageable (Perioperative Period)  Outcome: Ongoing (interventions implemented as appropriate)   02/20/18 0648   Perioperative Period   Problems Assessed (Perioperative Period) pain   Problems Present (Perioperative Period) pain

## 2018-02-20 NOTE — ANESTHESIA PREPROCEDURE EVALUATION
Anesthesia Evaluation     Patient summary reviewed and Nursing notes reviewed   no history of anesthetic complications:  NPO Solid Status: > 8 hours  NPO Liquid Status: > 8 hours           Airway   Mallampati: II  TM distance: >3 FB  Neck ROM: full  no difficulty expected  Dental - normal exam   (+) lower dentures and upper dentures    Pulmonary - normal exam    breath sounds clear to auscultation  (+) COPD, shortness of breath,   Cardiovascular - normal exam    ECG reviewed  Rhythm: regular  Rate: normal    (+) hypertension well controlled, valvular problems/murmurs, CHF (chronic diastolic heart failure....LVEF 70%), MORENO, PVD, hyperlipidemia      Neuro/Psych  (+) headaches, psychiatric history Anxiety,     GI/Hepatic/Renal/Endo    (+)  hiatal hernia, GERD,     Musculoskeletal     (+) back pain (h/o spondylolisthesis), gait problem,   Abdominal  - normal exam    Bowel sounds: normal.   Substance History - negative use     OB/GYN negative ob/gyn ROS         Other   (+) arthritis     ROS/Med Hx Other: H/O Raynaud's syndrome and migraine headaches                    Anesthesia Plan    ASA 3     general     intravenous induction   Anesthetic plan and risks discussed with patient.  Use of blood products discussed with patient  Consented to blood products.   Plan discussed with CRNA.

## 2018-02-20 NOTE — PERIOPERATIVE NURSING NOTE
Pt states was notified that nasal swab was positive.  Pt states 2 pre surgery baths completed as instructed, chlorhexidine wipes used and has been applying bactroban ointment to nares.

## 2018-02-20 NOTE — DISCHARGE PLACEMENT REQUEST
"Lisa Burkett (78 y.o. Female)     Date of Birth Social Security Number Address Home Phone MRN    [id number]  [address] [place] [address] [phone] [id number]    Hindu Marital Status          Evangelical        Admission Date Admission Type Admitting Provider Attending Provider Department, Room/Bed    2/20/18 Elective Oswaldo Fisher MD Belhasen, Ronald Keith, MD [address], 3338/1P    Discharge Date Discharge Disposition Discharge Destination                      Attending Provider: Oswaldo Fisher MD     Allergies:  Amlodipine, Beta Adrenergic Blockers, Ergotamine-caffeine, Lisinopril, Metoprolol, Tenormin [Atenolol], Trovan [Trovafloxacin], Atorvastatin, Cefprozil, Codeine, Contrast Dye, Coreg [Carvedilol], Fish-derived Products, Penicillins, Sectral [Acebutolol Hcl], Sulfa Antibiotics, Tetracycline    Isolation:  Contact   Infection:  MRSA (03/16/17)   Code Status:  FULL    Ht:  152.4 cm (60\")   Wt:  56.7 kg (125 lb)    Admission Cmt:  None   Principal Problem:  None                Active Insurance as of 2/20/2018     Primary Coverage     Payor Plan Insurance Group Employer/Plan Group    [facility]AC[facility] [id number]     Payor Plan Address Payor Plan Phone Number Effective From Effective To    [address] [address]  1/1/2016     [place] [address]       Subscriber Name Subscriber Birth Date Member ID       LSIA BURKETT [id number]                 Emergency Contacts      (Rel.) Home Phone Work Phone Mobile Phone    Sg Dooley! (Relative) -- -- [phone]    Renita Ariza (Friend) [phone] -- --               History & Physical      Flako Lam IV, MD at 1/26/2018  1:15 PM          Encounter Date:01/26/2018    Patient ID: Lisa Burkett is a 77 y.o. female who resides in [place], [place].    CC/Reason for visit:  Chronic diastolic heart failure            Lisa Burkett " returns to my office today in follow up of her chronic diastolic heart failure and hypertension. Since last visit, patient has been feeling well overall from a cardiovascular standpoint. She states that can barely walk and is going February 20th to get it replaced with Dr. Fisher. She has been experiencing significant lower extremity edema. Patient has also been struggling with advanced arthritis. She reports chronic weakness with anything she does. She also gets short of breath with exertion. She believes a lot of her symptoms are due to anxiety.    Review of Systems   Constitution: Positive for diaphoresis, weakness, malaise/fatigue and night sweats.   Eyes: Negative for vision loss in left eye and vision loss in right eye.   Cardiovascular: Positive for leg swelling and palpitations. Negative for chest pain, dyspnea on exertion, near-syncope, orthopnea, paroxysmal nocturnal dyspnea and syncope.   Respiratory: Positive for shortness of breath.    Endocrine: Positive for heat intolerance.   Hematologic/Lymphatic: Bruises/bleeds easily.   Musculoskeletal: Positive for arthritis, joint pain, joint swelling and muscle weakness. Negative for myalgias.   Gastrointestinal: Positive for bloating and heartburn.   Neurological: Negative for brief paralysis, excessive daytime sleepiness, focal weakness, numbness and paresthesias.   Psychiatric/Behavioral: The patient is nervous/anxious.    Allergic/Immunologic:        Food allergies    All other systems reviewed and are negative.      The patient's past medical, social, family history and ROS reviewed in the patient's electronic medical record.    Allergies  Amlodipine; Beta adrenergic blockers; Ergotamine-caffeine; Lisinopril; Metoprolol; Other; Tenormin [atenolol]; Trovan [trovafloxacin]; Atorvastatin; Cefprozil; Codeine; Contrast dye; Coreg [carvedilol]; Fish-derived products; Penicillins; Sectral [acebutolol hcl]; Sulfa antibiotics; and Tetracycline    Outpatient  Prescriptions Marked as Taking for the 1/26/18 encounter (Office Visit) with Flako Lam IV, MD   Medication Sig Dispense Refill   • amitriptyline (ELAVIL) 10 MG tablet Take 20 mg by mouth At Night As Needed.     • aspirin 81 MG tablet Take 1 tablet by mouth daily.     • baclofen (LIORESAL) 10 MG tablet Take 1 tablet by mouth 3 (Three) Times a Day.     • busPIRone (BUSPAR) 10 MG tablet Take 10 mg by mouth 4 (Four) Times a Day As Needed.  4   • Calcium-Phosphorus-Vitamin D (CITRACAL +D3 PO) Take 1 tablet by mouth Daily.     • celecoxib (CELEBREX) 200 MG capsule Take 200 mg by mouth 2 (Two) Times a Day.     • fluticasone (FLONASE) 50 MCG/ACT nasal spray 1 spray into each nostril Daily.  1   • furosemide (LASIX) 40 MG tablet Take 1 tablet by mouth Daily for 180 days. 90 tablet 1   • gabapentin (NEURONTIN) 600 MG tablet Take 600 mg by mouth 3 (Three) Times a Day.     • loratadine (CLARITIN) 10 MG tablet Take 10 mg by mouth Daily.  1   • Multiple Vitamin (MULTI VITAMIN DAILY) tablet Take 1 tablet by mouth Daily.     • Multiple Vitamins-Minerals (HAIR SKIN AND NAILS FORMULA PO) Take 1 tablet by mouth Daily.     • NIFEdipine XL (PROCARDIA XL) 30 MG 24 hr tablet Take 30 mg by mouth 3 (Three) Times a Day.     • omeprazole (PriLOSEC) 20 MG capsule Take 20 mg by mouth 2 (Two) Times a Day.     • potassium chloride (MICRO-K) 10 MEQ CR capsule Take 1 tablet by mouth 3 (Three) Times a Day.     • PROAIR  (90 BASE) MCG/ACT inhaler Inhale 2 puffs Every 6 (Six) Hours As Needed.     • traMADol (ULTRAM) 50 MG tablet Take 50 mg by mouth Every 6 (Six) Hours As Needed for Moderate Pain .     • [DISCONTINUED] furosemide (LASIX) 20 MG tablet Take 20 mg by mouth Daily As Needed.     • [DISCONTINUED] furosemide (LASIX) 40 MG tablet Take 1 tablet by mouth Daily As Needed (swelling). 90 tablet 1   • [DISCONTINUED] hydrochlorothiazide (MICROZIDE) 12.5 MG capsule Take 12.5 mg by mouth Daily.           Blood pressure 160/72,  "pulse 98, height 152.4 cm (60\"), weight 58.5 kg (129 lb), unknown if currently breastfeeding.  Body mass index is 25.19 kg/(m^2).    Physical Exam   Constitutional: She is oriented to person, place, and time. She appears well-developed and well-nourished.   HENT:   Head: Normocephalic and atraumatic.   Eyes: Pupils are equal, round, and reactive to light. No scleral icterus.   Neck: No JVD present. Carotid bruit is not present. No thyromegaly present.   Cardiovascular: Normal rate, regular rhythm, S1 normal and S2 normal.  Exam reveals no gallop.    Murmur heard.  High-pitched blowing holosystolic murmur is present with a grade of 2/6  at the apex  Pulmonary/Chest: Effort normal and breath sounds normal.   Abdominal: Soft. There is no hepatosplenomegaly. There is no tenderness.   Neurological: She is alert and oriented to person, place, and time.   Skin: Skin is warm and dry. No cyanosis. Nails show no clubbing.   Psychiatric: She has a normal mood and affect. Her behavior is normal.       Data Review:     Lab Results   Component Value Date    WBC 6.95 03/16/2017    HGB 9.3 (L) 03/18/2017    HCT 28.6 (L) 03/18/2017    MCV 90.3 03/16/2017     03/16/2017     Lab Results   Component Value Date    HGBA1C 5.80 (H) 03/16/2017     Lab Results   Component Value Date    GLUCOSE 158 (H) 03/16/2017    BUN 16 03/16/2017    CREATININE 0.70 03/16/2017    EGFRIFNONA 81 03/16/2017    BCR 22.9 03/16/2017    K 3.3 (L) 03/16/2017    CO2 28.0 03/16/2017    CALCIUM 10.1 03/16/2017    ALBUMIN 4.60 03/16/2017    LABIL2 1.6 03/16/2017    AST 33 03/16/2017    ALT 31 03/16/2017     Procedures       Problem List Items Addressed This Visit        Cardiovascular and Mediastinum    Essential hypertension    Overview     · Renal angiography (5/23/2012): normal bilateral renal arteries.          Current Assessment & Plan     Blood pressure remains elevated today. She's had historical intolerance to multiple medications to treat her blood " pressure and unfortunately I believe this is causing some of her diastolic heart failure symptoms. I will intensify her diuretic therapy today in hopes of removing her heart failure symptoms and reducing her edema.         Relevant Medications    furosemide (LASIX) 40 MG tablet    Chronic diastolic heart failure - Primary    Overview     · Echo (8/10/2011): LVH. LVEF 70%.  No significant valvular abnormality.   · Pharmacologic nuclear stress (7/28/2011): No ischemia. LVEF 80%.  · Recurrent chest pain/CHF presentations to the [facility].  · Cardiac catheterization (5/23/2012):  Normal coronary arteries, LVEF 65%, LVEDP 32 mmHg.  · Echocardiogram for worsening dyspnea (3/18/2016): LVEF >65%. No significant valvular abnormalities. RVSP 43 mmHg.   · GXT (3/18/2016): Exercised for 3 minutes (6 minutes expected). No ischemic changes. Normal oxygen saturation.          Current Assessment & Plan     · Functional class III heart failure symptoms which are stable  · We will intensify her diuretic therapy by increasing furosemide dosing to 40 mg daily and discontinuing hydrochlorothiazide.         Relevant Medications    furosemide (LASIX) 40 MG tablet    Other Relevant Orders    CBC (No Diff)    Basic Metabolic Panel    BNP    Basic Metabolic Panel       Other    Localized edema    Current Assessment & Plan     Bilateral lower extremity edema multifactorial and due to vascular insufficiency, nifedipine use, and diastolic heart failure.                    · Discontinue hydrochlorothiazide  · Increase furosemide to 40 mg daily  · BMP today and in 2 weeks  · Proceed with right total hip arthroplasty with no further cardiac testing  · Return in about 6 months (around 7/26/2018).      Flako Lam IV, MD  1/26/2018     Scribed for Flako Lam IV, MD by Jacobo Horn. 1/26/2018  2:17 PM       Electronically signed by Flako Lam IV, MD at 1/26/2018  2:18 PM      Oswaldo Rolon  MD Phillip at 2/20/2018  7:29 AM          H&P reviewed. The patient was examined and there are no changes to the H&P.     Electronically signed by Oswaldo Fisher MD at 2/20/2018  7:29 AM    Source Note             Encounter Date:01/26/2018    Patient ID: Lisa Calloway is a 77 y.o. female who resides in [place], [place].    CC/Reason for visit:  Chronic diastolic heart failure            Lisa Calloway returns to my office today in follow up of her chronic diastolic heart failure and hypertension. Since last visit, patient has been feeling well overall from a cardiovascular standpoint. She states that can barely walk and is going February 20th to get it replaced with Dr. Fisher. She has been experiencing significant lower extremity edema. Patient has also been struggling with advanced arthritis. She reports chronic weakness with anything she does. She also gets short of breath with exertion. She believes a lot of her symptoms are due to anxiety.    Review of Systems   Constitution: Positive for diaphoresis, weakness, malaise/fatigue and night sweats.   Eyes: Negative for vision loss in left eye and vision loss in right eye.   Cardiovascular: Positive for leg swelling and palpitations. Negative for chest pain, dyspnea on exertion, near-syncope, orthopnea, paroxysmal nocturnal dyspnea and syncope.   Respiratory: Positive for shortness of breath.    Endocrine: Positive for heat intolerance.   Hematologic/Lymphatic: Bruises/bleeds easily.   Musculoskeletal: Positive for arthritis, joint pain, joint swelling and muscle weakness. Negative for myalgias.   Gastrointestinal: Positive for bloating and heartburn.   Neurological: Negative for brief paralysis, excessive daytime sleepiness, focal weakness, numbness and paresthesias.   Psychiatric/Behavioral: The patient is nervous/anxious.    Allergic/Immunologic:        Food allergies    All other systems reviewed and are negative.      The patient's past medical,  social, family history and ROS reviewed in the patient's electronic medical record.    Allergies  Amlodipine; Beta adrenergic blockers; Ergotamine-caffeine; Lisinopril; Metoprolol; Other; Tenormin [atenolol]; Trovan [trovafloxacin]; Atorvastatin; Cefprozil; Codeine; Contrast dye; Coreg [carvedilol]; Fish-derived products; Penicillins; Sectral [acebutolol hcl]; Sulfa antibiotics; and Tetracycline    Outpatient Prescriptions Marked as Taking for the 1/26/18 encounter (Office Visit) with Flako Lam IV, MD   Medication Sig Dispense Refill   • amitriptyline (ELAVIL) 10 MG tablet Take 20 mg by mouth At Night As Needed.     • aspirin 81 MG tablet Take 1 tablet by mouth daily.     • baclofen (LIORESAL) 10 MG tablet Take 1 tablet by mouth 3 (Three) Times a Day.     • busPIRone (BUSPAR) 10 MG tablet Take 10 mg by mouth 4 (Four) Times a Day As Needed.  4   • Calcium-Phosphorus-Vitamin D (CITRACAL +D3 PO) Take 1 tablet by mouth Daily.     • celecoxib (CELEBREX) 200 MG capsule Take 200 mg by mouth 2 (Two) Times a Day.     • fluticasone (FLONASE) 50 MCG/ACT nasal spray 1 spray into each nostril Daily.  1   • furosemide (LASIX) 40 MG tablet Take 1 tablet by mouth Daily for 180 days. 90 tablet 1   • gabapentin (NEURONTIN) 600 MG tablet Take 600 mg by mouth 3 (Three) Times a Day.     • loratadine (CLARITIN) 10 MG tablet Take 10 mg by mouth Daily.  1   • Multiple Vitamin (MULTI VITAMIN DAILY) tablet Take 1 tablet by mouth Daily.     • Multiple Vitamins-Minerals (HAIR SKIN AND NAILS FORMULA PO) Take 1 tablet by mouth Daily.     • NIFEdipine XL (PROCARDIA XL) 30 MG 24 hr tablet Take 30 mg by mouth 3 (Three) Times a Day.     • omeprazole (PriLOSEC) 20 MG capsule Take 20 mg by mouth 2 (Two) Times a Day.     • potassium chloride (MICRO-K) 10 MEQ CR capsule Take 1 tablet by mouth 3 (Three) Times a Day.     • PROAIR  (90 BASE) MCG/ACT inhaler Inhale 2 puffs Every 6 (Six) Hours As Needed.     • traMADol (ULTRAM) 50 MG  "tablet Take 50 mg by mouth Every 6 (Six) Hours As Needed for Moderate Pain .     • [DISCONTINUED] furosemide (LASIX) 20 MG tablet Take 20 mg by mouth Daily As Needed.     • [DISCONTINUED] furosemide (LASIX) 40 MG tablet Take 1 tablet by mouth Daily As Needed (swelling). 90 tablet 1   • [DISCONTINUED] hydrochlorothiazide (MICROZIDE) 12.5 MG capsule Take 12.5 mg by mouth Daily.           Blood pressure 160/72, pulse 98, height 152.4 cm (60\"), weight 58.5 kg (129 lb), unknown if currently breastfeeding.  Body mass index is 25.19 kg/(m^2).    Physical Exam   Constitutional: She is oriented to person, place, and time. She appears well-developed and well-nourished.   HENT:   Head: Normocephalic and atraumatic.   Eyes: Pupils are equal, round, and reactive to light. No scleral icterus.   Neck: No JVD present. Carotid bruit is not present. No thyromegaly present.   Cardiovascular: Normal rate, regular rhythm, S1 normal and S2 normal.  Exam reveals no gallop.    Murmur heard.  High-pitched blowing holosystolic murmur is present with a grade of 2/6  at the apex  Pulmonary/Chest: Effort normal and breath sounds normal.   Abdominal: Soft. There is no hepatosplenomegaly. There is no tenderness.   Neurological: She is alert and oriented to person, place, and time.   Skin: Skin is warm and dry. No cyanosis. Nails show no clubbing.   Psychiatric: She has a normal mood and affect. Her behavior is normal.       Data Review:     Lab Results   Component Value Date    WBC 6.95 03/16/2017    HGB 9.3 (L) 03/18/2017    HCT 28.6 (L) 03/18/2017    MCV 90.3 03/16/2017     03/16/2017     Lab Results   Component Value Date    HGBA1C 5.80 (H) 03/16/2017     Lab Results   Component Value Date    GLUCOSE 158 (H) 03/16/2017    BUN 16 03/16/2017    CREATININE 0.70 03/16/2017    EGFRIFNONA 81 03/16/2017    BCR 22.9 03/16/2017    K 3.3 (L) 03/16/2017    CO2 28.0 03/16/2017    CALCIUM 10.1 03/16/2017    ALBUMIN 4.60 03/16/2017    LABIL2 1.6 " 03/16/2017    AST 33 03/16/2017    ALT 31 03/16/2017     Procedures       Problem List Items Addressed This Visit        Cardiovascular and Mediastinum    Essential hypertension    Overview     · Renal angiography (5/23/2012): normal bilateral renal arteries.          Current Assessment & Plan     Blood pressure remains elevated today. She's had historical intolerance to multiple medications to treat her blood pressure and unfortunately I believe this is causing some of her diastolic heart failure symptoms. I will intensify her diuretic therapy today in hopes of removing her heart failure symptoms and reducing her edema.         Relevant Medications    furosemide (LASIX) 40 MG tablet    Chronic diastolic heart failure - Primary    Overview     · Echo (8/10/2011): LVH. LVEF 70%.  No significant valvular abnormality.   · Pharmacologic nuclear stress (7/28/2011): No ischemia. LVEF 80%.  · Recurrent chest pain/CHF presentations to the [place], [place] area.  · Cardiac catheterization (5/23/2012):  Normal coronary arteries, LVEF 65%, LVEDP 32 mmHg.  · Echocardiogram for worsening dyspnea (3/18/2016): LVEF >65%. No significant valvular abnormalities. RVSP 43 mmHg.   · GXT (3/18/2016): Exercised for 3 minutes (6 minutes expected). No ischemic changes. Normal oxygen saturation.          Current Assessment & Plan     · Functional class III heart failure symptoms which are stable  · We will intensify her diuretic therapy by increasing furosemide dosing to 40 mg daily and discontinuing hydrochlorothiazide.         Relevant Medications    furosemide (LASIX) 40 MG tablet    Other Relevant Orders    CBC (No Diff)    Basic Metabolic Panel    BNP    Basic Metabolic Panel       Other    Localized edema    Current Assessment & Plan     Bilateral lower extremity edema multifactorial and due to vascular insufficiency, nifedipine use, and diastolic heart failure.                    · Discontinue hydrochlorothiazide  · Increase  furosemide to 40 mg daily  · BMP today and in 2 weeks  · Proceed with right total hip arthroplasty with no further cardiac testing  · Return in about 6 months (around 7/26/2018).      Flako Lam IV, MD  1/26/2018     Scribed for Flako Lam IV, MD by Jacobo Horn. 1/26/2018  2:17 PM        Electronically signed by Flako Lam IV, MD at 1/26/2018  2:18 PM              Vital Signs (last 24 hours)       02/19 0700  -  02/20 0659 02/20 0700  -  02/20 1554   Most Recent    Temp (°F)   98.5    97.4 -  98     98 (36.7)    Heart Rate   90    85 -  93     92    Resp   20    (!)2 -  20     (!) 2    BP   158/59    120/58 -  156/57     145/63    SpO2 (%)   98    95 -  100     98          Intake & Output (last day)       02/19 0701 - 02/20 0700 02/20 0701 - 02/21 0700    I.V. (mL/kg)  200 (3.5)    Total Intake(mL/kg)  200 (3.5)    Urine (mL/kg/hr)  0 (0)    Blood  100 (0.2)    Total Output   100    Net   +100          Unmeasured Urine Occurrence  1 x        Hospital Medications (active)       Dose Frequency Start End    acetaminophen (TYLENOL) tablet 487.5 mg 487.5 mg Every 6 Hours PRN 2/20/2018     Sig - Route: Take 1.5 tablets by mouth Every 6 (Six) Hours As Needed for Mild Pain . - Oral    albuterol (PROVENTIL) nebulizer solution 0.083% 2.5 mg/3mL 2.5 mg Every 4 Hours PRN 2/20/2018     Sig - Route: Take 2.5 mg by nebulization Every 4 (Four) Hours As Needed for Wheezing. - Nebulization    amitriptyline (ELAVIL) tablet 20 mg 20 mg Nightly 2/20/2018     Sig - Route: Take 2 tablets by mouth Every Night. - Oral    apixaban (ELIQUIS) tablet 2.5 mg 2.5 mg Every 12 Hours Scheduled 2/21/2018     Sig - Route: Take 1 tablet by mouth Every 12 (Twelve) Hours. - Oral    aspirin EC tablet 81 mg 81 mg Daily 2/20/2018     Sig - Route: Take 1 tablet by mouth Daily. - Oral    bacitracin 150,000 Units, polymyxin B 1,500,000 Units in sodium chloride (NS) 3,000 mL irrigation  Once 2/20/2018     Sig -  "Route: Irrigate with  as directed 1 (One) Time. - Irrigation    baclofen (LIORESAL) tablet 10 mg 10 mg 3 Times Daily 2/20/2018     Sig - Route: Take 1 tablet by mouth 3 (Three) Times a Day. - Oral    busPIRone (BUSPAR) tablet 10 mg 10 mg 4 Times Daily 2/20/2018     Sig - Route: Take 1 tablet by mouth 4 (Four) Times a Day. - Oral    calcium carb-cholecalciferol 600-800 MG-UNIT tablet 1 tablet 1 tablet Daily 2/20/2018     Sig - Route: Take 1 tablet by mouth Daily. - Oral    celecoxib (CeleBREX) capsule 200 mg 200 mg 2 Times Daily 2/20/2018     Sig - Route: Take 1 capsule by mouth 2 (Two) Times a Day. - Oral    cetirizine (zyrTEC) tablet 5 mg 5 mg Daily 2/21/2018     Sig - Route: Take 0.5 tablets by mouth Daily. - Oral    docusate sodium (COLACE) capsule 100 mg 100 mg 2 Times Daily PRN 2/20/2018     Sig - Route: Take 1 capsule by mouth 2 (Two) Times a Day As Needed for Constipation. - Oral    fluticasone (FLONASE) 50 MCG/ACT nasal spray 1 spray 1 spray Daily 2/20/2018     Sig - Route: 1 spray by Each Nare route Daily. - Each Nare    furosemide (LASIX) tablet 40 mg 40 mg Daily 2/20/2018     Sig - Route: Take 1 tablet by mouth Daily. - Oral    gabapentin (NEURONTIN) capsule 600 mg 600 mg Every 8 Hours Scheduled 2/20/2018     Sig - Route: Take 2 capsules by mouth Every 8 (Eight) Hours. - Oral    HYDROcodone-acetaminophen (NORCO) 7.5-325 MG per tablet 2 tablet 2 tablet Every 4 Hours PRN 2/20/2018 3/2/2018    Sig - Route: Take 2 tablets by mouth Every 4 (Four) Hours As Needed for Severe Pain . - Oral    HYDROmorphone (DILAUDID) injection 0.5 mg 0.5 mg Every 2 Hours PRN 2/20/2018 3/2/2018    Sig - Route: Infuse 0.5 mL into a venous catheter Every 2 (Two) Hours As Needed for Severe Pain . - Intravenous    Linked Group 1:  \"And\" Linked Group Details        HYDROmorphone (DILAUDID) injection 0.5 mg 0.5 mg Once 2/20/2018 2/20/2018    Sig - Route: Infuse 0.5 mL into a venous catheter 1 (One) Time. - Intravenous    HYDROmorphone " "(DILAUDID) injection 0.5 mg 0.5 mg Once 2/20/2018 2/20/2018    Sig - Route: Infuse 0.5 mL into a venous catheter 1 (One) Time. - Intravenous    ipratropium-albuterol (DUO-NEB) nebulizer solution 3 mL 3 mL 4 Times Daily - RT 2/20/2018     Sig - Route: Take 3 mL by nebulization 4 (Four) Times a Day. - Nebulization    Cosign for Ordering: Required by Jonathan Antoine DO    lactated ringers infusion 60 mL/hr Continuous 2/20/2018     Sig - Route: Infuse 60 mL/hr into a venous catheter Continuous. - Intravenous    Magnesium Sulfate 2 gram Bolus, followed by 8 gram infusion (total Mg dose 10 grams)- Mg less than or equal to 1mg/dL 2 g As Needed 2/20/2018     Sig - Route: Infuse 50 mL into a venous catheter As Needed (Mg less than or equal to 1mg/dL). - Intravenous    Cosign for Ordering: Required by Jonathan Antoine DO    Linked Group 2:  \"Or\" Linked Group Details        magnesium sulfate 4 gram infusion- Mg 1.6-1.9 mg/dL 4 g As Needed 2/20/2018     Sig - Route: Infuse 100 mL into a venous catheter As Needed (Mg 1.6-1.9 mg/dL). - Intravenous    Cosign for Ordering: Required by Jonathan Antoine DO    Linked Group 2:  \"Or\" Linked Group Details        Magnesium Sulfate 6 gram Infusion (2 gm x 3) -Mg 1.1 -1.5 mg/dL 2 g As Needed 2/20/2018     Sig - Route: Infuse 50 mL into a venous catheter As Needed (Mg 1.1 -1.5 mg/dL). - Intravenous    Cosign for Ordering: Required by Jonathan Antoine DO    Linked Group 2:  \"Or\" Linked Group Details        multivitamin (DAILY FLAVIO) tablet 1 tablet 1 tablet Daily 2/20/2018     Sig - Route: Take 1 tablet by mouth Daily. - Oral    naloxone (NARCAN) injection 0.1 mg 0.1 mg Every 5 Minutes PRN 2/20/2018     Sig - Route: Infuse 0.25 mL into a venous catheter Every 5 (Five) Minutes As Needed for Respiratory Depression. - Intravenous    Linked Group 1:  \"And\" Linked Group Details        NIFEdipine CC (ADALAT CC) 24 hr tablet 30 mg 30 mg Every 24 Hours Scheduled 2/20/2018     " "Sig - Route: Take 1 tablet by mouth Daily. - Oral    Cosign for Ordering: Required by Jonathan Antoine DO    ondansetron (ZOFRAN) injection 4 mg 4 mg Every 6 Hours PRN 2/20/2018     Sig - Route: Infuse 2 mL into a venous catheter Every 6 (Six) Hours As Needed for Nausea or Vomiting. - Intravenous    Linked Group 3:  \"Or\" Linked Group Details        ondansetron (ZOFRAN) tablet 4 mg 4 mg Every 6 Hours PRN 2/20/2018     Sig - Route: Take 1 tablet by mouth Every 6 (Six) Hours As Needed for Nausea or Vomiting. - Oral    Linked Group 3:  \"Or\" Linked Group Details        ondansetron ODT (ZOFRAN-ODT) disintegrating tablet 4 mg 4 mg Every 6 Hours PRN 2/20/2018     Sig - Route: Take 1 tablet by mouth Every 6 (Six) Hours As Needed for Nausea or Vomiting. - Oral    Linked Group 3:  \"Or\" Linked Group Details        pantoprazole (PROTONIX) EC tablet 40 mg 40 mg Every Morning 2/21/2018     Sig - Route: Take 1 tablet by mouth Every Morning. - Oral    potassium chloride (K-DUR,KLOR-CON) CR tablet 40 mEq 40 mEq Every 4 Hours 2/20/2018 2/21/2018    Sig - Route: Take 2 tablets by mouth Every 4 (Four) Hours. - Oral    potassium chloride (K-DUR,KLOR-CON) ER tablet 40 mEq 40 mEq As Needed 2/20/2018     Sig - Route: Take 4 tablets by mouth As Needed (potassium replacement.  see admin instructions). - Oral    Cosign for Ordering: Required by Jonathan Antoine DO    Linked Group 4:  \"Or\" Linked Group Details        potassium chloride (KLOR-CON) packet 40 mEq 40 mEq As Needed 2/20/2018     Sig - Route: Take 40 mEq by mouth As Needed (potassium replacement, see admin instructions). - Oral    Cosign for Ordering: Required by Jonathan Antoine DO    Linked Group 4:  \"Or\" Linked Group Details        potassium chloride 10 mEq in 100 mL IVPB 10 mEq Every 1 Hour PRN 2/20/2018     Sig - Route: Infuse 100 mL into a venous catheter Every 1 (One) Hour As Needed (potassium protocol PERIPHERAL - see admin instructions). - Intravenous    " "Cosign for Ordering: Required by Jonathan Antoine DO    Linked Group 4:  \"Or\" Linked Group Details        traMADol (ULTRAM) tablet 50 mg 50 mg Every 6 Hours PRN 2/20/2018     Sig - Route: Take 1 tablet by mouth Every 6 (Six) Hours As Needed for Moderate Pain . - Oral    Tranexamic Acid 567 mg in sodium chloride 0.9 % 100 mL 10 mg/kg × 56.7 kg Once 2/20/2018 2/20/2018    Sig - Route: Infuse 567 mg into a venous catheter 1 (One) Time. - Intravenous    Tranexamic Acid 567 mg in sodium chloride 0.9 % 100 mL 10 mg/kg × 56.7 kg Once 2/20/2018 2/20/2018    Sig - Route: Infuse 567 mg into a venous catheter 1 (One) Time. - Intravenous    vancomycin (VANCOCIN) 750 mg in sodium chloride 0.9 % 250 mL IVPB 15 mg/kg × 56.7 kg Once 2/20/2018 2/20/2018    Sig - Route: Infuse 750 mg into a venous catheter 1 (One) Time. - Intravenous    vancomycin (VANCOCIN) 750 mg in sodium chloride 0.9 % 250 mL IVPB 15 mg/kg × 56.7 kg Once 2/20/2018     Sig - Route: Infuse 750 mg into a venous catheter 1 (One) Time. - Intravenous    bacitracin 150,000 Units, polymyxin B 1,500,000 Units in sodium chloride (NS) 3,000 mL irrigation (Discontinued)  As Needed 2/20/2018 2/20/2018    Sig: As Needed.    Reason for Discontinue: Patient Discharge    dexamethasone (DECADRON) injection (Discontinued)  As Needed 2/20/2018 2/20/2018    Sig - Route: Infuse  into a venous catheter As Needed. - Intravenous    Reason for Discontinue: Anesthesia Stop    ePHEDrine injection (Discontinued)  As Needed 2/20/2018 2/20/2018    Sig: As Needed.    Reason for Discontinue: Anesthesia Stop    fentaNYL citrate (PF) (SUBLIMAZE) injection 50 mcg (Discontinued) 50 mcg Every 5 Minutes PRN 2/20/2018 2/20/2018    Sig - Route: Infuse 1 mL into a venous catheter Every 5 (Five) Minutes As Needed for Moderate Pain  or Severe Pain . - Intravenous    Reason for Discontinue: Patient Transfer    fentaNYL citrate (PF) (SUBLIMAZE) injection (Discontinued)  As Needed 2/20/2018 2/20/2018    " Sig - Route: Infuse  into a venous catheter As Needed for Severe Pain . - Intravenous    Reason for Discontinue: Anesthesia Stop    ipratropium-albuterol (DUO-NEB) nebulizer solution 3 mL (Discontinued) 3 mL Once As Needed 2/20/2018 2/20/2018    Sig - Route: Take 3 mL by nebulization 1 (One) Time As Needed for Wheezing or Shortness of Air (bronchospasm). - Nebulization    Reason for Discontinue: Patient Transfer    lactated ringers infusion (Discontinued) 125 mL/hr Continuous 2/20/2018 2/20/2018    Sig - Route: Infuse 125 mL/hr into a venous catheter Continuous. - Intravenous    lidocaine PF 2% (XYLOCAINE) injection (Discontinued)  As Needed 2/20/2018 2/20/2018    Sig: As Needed.    Reason for Discontinue: Anesthesia Stop    meperidine (DEMEROL) injection 12.5 mg (Discontinued) 12.5 mg Every 5 Minutes PRN 2/20/2018 2/20/2018    Sig - Route: Infuse 0.5 mL into a venous catheter Every 5 (Five) Minutes As Needed for Shivering (May repeat x 1). - Intravenous    Reason for Discontinue: Patient Transfer    NIFEdipine XL (PROCARDIA XL) 24 hr tablet 30 mg (Discontinued) 30 mg 3 Times Daily 2/20/2018 2/20/2018    Sig - Route: Take 1 tablet by mouth 3 (Three) Times a Day. - Oral    ondansetron (ZOFRAN) injection 4 mg (Discontinued) 4 mg Once As Needed 2/20/2018 2/20/2018    Sig - Route: Infuse 2 mL into a venous catheter 1 (One) Time As Needed for Nausea or Vomiting (may repeat times one dose). - Intravenous    Reason for Discontinue: Patient Transfer    ondansetron (ZOFRAN) injection (Discontinued)  As Needed 2/20/2018 2/20/2018    Sig - Route: Infuse  into a venous catheter As Needed for Nausea or Vomiting. - Intravenous    Reason for Discontinue: Anesthesia Stop    oxyCODONE-acetaminophen (PERCOCET) 5-325 MG per tablet 1 tablet (Discontinued) 1 tablet Once As Needed 2/20/2018 2/20/2018    Sig - Route: Take 1 tablet by mouth 1 (One) Time As Needed (pain). - Oral    Reason for Discontinue: Patient Transfer    potassium  chloride (K-DUR,KLOR-CON) ER tablet 10 mEq (Discontinued) 10 mEq 3 Times Daily 2/20/2018 2/20/2018    Sig - Route: Take 1 tablet by mouth 3 (Three) Times a Day. - Oral    Propofol (DIPRIVAN) injection (Discontinued)  As Needed 2/20/2018 2/20/2018    Sig - Route: Infuse  into a venous catheter As Needed. - Intravenous    Reason for Discontinue: Anesthesia Stop    sodium chloride 0.9 % flush 1-10 mL (Discontinued) 1-10 mL As Needed 2/20/2018 2/20/2018    Sig - Route: Infuse 1-10 mL into a venous catheter As Needed for Line Care. - Intravenous    Reason for Discontinue: Patient Transfer    sodium chloride 0.9 % flush 1-10 mL (Discontinued) 1-10 mL As Needed 2/20/2018 2/20/2018    Sig - Route: Infuse 1-10 mL into a venous catheter As Needed for Line Care. - Intravenous    Reason for Discontinue: Patient Transfer            Lab Results (last 24 hours)     Procedure Component Value Units Date/Time    CBC & Differential [[id number]] Collected:  02/20/18 1312    Specimen:  Blood Updated:  02/20/18 1341    Narrative:       The following orders were created for panel order CBC & Differential.  Procedure                               Abnormality         Status                     ---------                               -----------         ------                     CBC Auto Differential[[id number]]        Abnormal            Final result                 Please view results for these tests on the individual orders.    CBC Auto Differential [[id number]]  (Abnormal) Collected:  02/20/18 1312    Specimen:  Blood Updated:  02/20/18 1341     WBC 11.99 10*3/mm3      RBC 3.65 (L) 10*6/mm3      Hemoglobin 10.5 (L) g/dL      Hematocrit 32.8 (L) %      MCV 89.9 fL      MCH 28.8 pg      MCHC 32.0 (L) g/dL      RDW 14.5 %      RDW-SD 47.6 fl      MPV 10.1 (H) fL      Platelets 298 10*3/mm3      Neutrophil % 92.5 (H) %      Lymphocyte % 3.4 (L) %      Monocyte % 3.8 %      Eosinophil % 0.0 %      Basophil % 0.0 %      Immature Grans % 0.3 %       Neutrophils, Absolute 11.08 (H) 10*3/mm3      Lymphocytes, Absolute 0.41 (L) 10*3/mm3      Monocytes, Absolute 0.46 10*3/mm3      Eosinophils, Absolute 0.00 10*3/mm3      Basophils, Absolute 0.00 10*3/mm3      Immature Grans, Absolute 0.04 (H) 10*3/mm3     Basic Metabolic Panel [[id number]]  (Abnormal) Collected:  02/20/18 1312    Specimen:  Blood Updated:  02/20/18 1408     Glucose 169 (H) mg/dL      BUN 21 mg/dL      Creatinine 0.73 mg/dL      Sodium 140 mmol/L      Potassium 3.4 (L) mmol/L      Chloride 106 mmol/L      CO2 26.1 mmol/L      Calcium 8.4 mg/dL      eGFR Non African Amer 77 mL/min/1.73      BUN/Creatinine Ratio 28.8 (H)     Anion Gap 7.9 mmol/L     Narrative:       The MDRD GFR formula is only valid for adults with stable renal function between ages 18 and 70.    Osmolality, Calculated [[id number]]  (Normal) Collected:  02/20/18 1312    Specimen:  Blood Updated:  02/20/18 1408     Osmolality Calc 286.3 mOsm/kg         Imaging Results (last 24 hours)     Procedure Component Value Units Date/Time    XR Hip With or Without Pelvis 1 View Right [[id number]] Collected:  02/20/18 1107     Updated:  02/20/18 1111    Narrative:       EXAMINATION: XR HIP W OR WO PELVIS 2-3 VIEW RIGHT-      CLINICAL INDICATION:Postoperative; M25.551-Pain in right hip;  M16.11-Unilateral primary osteoarthritis, right hip; Z01.818-Encounter  for other preprocedural examination        COMPARISON: None      TECHNIQUE: Single view right hip     FINDINGS:   Status post right hip arthroplasty. Anatomic alignment. No radiographic  evidence of hardware complication.           Impression:       Status post right hip arthroplasty without complicating feature  identified.     This report was finalized on 2/20/2018 11:07 AM by Dr. Christian Regan MD.           Orders (last 24 hrs)     Start     Ordered    02/21/18 1600  Vital Signs  Every 8 Hours      02/20/18 1043    02/21/18 1600  Neurovascular Checks  Every 8 Hours      02/20/18 1043     02/21/18 0900  cetirizine (zyrTEC) tablet 5 mg  Daily      02/20/18 1043    02/21/18 0900  apixaban (ELIQUIS) tablet 2.5 mg  Every 12 Hours Scheduled      02/20/18 1043    02/21/18 0800  Up in Chair 3x / Day - Beginning POD 1  3 Times Daily      02/20/18 1043    02/21/18 0800  Ambulate in [place] 4x / Day Beginning POD 1  4 Times Daily      02/20/18 1043    02/21/18 0700  pantoprazole (PROTONIX) EC tablet 40 mg  Every Morning      02/20/18 1043    02/21/18 0600  CBC & Differential  Morning Draw      02/20/18 1043    02/21/18 0600  Comprehensive Metabolic Panel  Morning Draw      02/20/18 1433    02/21/18 0600  Magnesium  Morning Draw      02/20/18 1433    02/20/18 2100  amitriptyline (ELAVIL) tablet 20 mg  Nightly      02/20/18 1043    02/20/18 2100  vancomycin (VANCOCIN) 750 mg in sodium chloride 0.9 % 250 mL IVPB  Once      02/20/18 1043    02/20/18 1800  ipratropium-albuterol (DUO-NEB) nebulizer solution 3 mL  4 Times Daily - RT      02/20/18 1257    02/20/18 1600  NIFEdipine XL (PROCARDIA XL) 24 hr tablet 30 mg  3 Times Daily,   Status:  Discontinued      02/20/18 1043    02/20/18 1600  celecoxib (CeleBREX) capsule 200 mg  2 Times Daily      02/20/18 1428    02/20/18 1500  potassium chloride (K-DUR,KLOR-CON) CR tablet 40 mEq  Every 4 Hours      02/20/18 1423    02/20/18 1443  iv pump  Once      02/20/18 1442    02/20/18 1409  Osmolality, Calculated  Once      02/20/18 1408    02/20/18 1400  gabapentin (NEURONTIN) capsule 600 mg  Every 8 Hours Scheduled      02/20/18 1043    02/20/18 1400  NIFEdipine CC (ADALAT CC) 24 hr tablet 30 mg  Every 24 Hours Scheduled      02/20/18 1306    02/20/18 1300  aspirin EC tablet 81 mg  Daily      02/20/18 1043    02/20/18 1300  baclofen (LIORESAL) tablet 10 mg  3 Times Daily      02/20/18 1043    02/20/18 1300  busPIRone (BUSPAR) tablet 10 mg  4 Times Daily      02/20/18 1043    02/20/18 1300  fluticasone (FLONASE) 50 MCG/ACT nasal spray 1 spray  Daily      02/20/18 1043     02/20/18 1300  furosemide (LASIX) tablet 40 mg  Daily      02/20/18 1043    02/20/18 1300  multivitamin (DAILY FLAVIO) tablet 1 tablet  Daily      02/20/18 1043    02/20/18 1300  potassium chloride (K-DUR,KLOR-CON) ER tablet 10 mEq  3 Times Daily,   Status:  Discontinued      02/20/18 1043    02/20/18 1300  calcium carb-cholecalciferol 600-800 MG-UNIT tablet 1 tablet  Daily      02/20/18 1043    02/20/18 1257  CBC & Differential  Once      02/20/18 1257    02/20/18 1257  Basic Metabolic Panel  Once      02/20/18 1257    02/20/18 1257  CBC Auto Differential  PROCEDURE ONCE      02/20/18 1257    02/20/18 1256  Magnesium Sulfate 2 gram Bolus, followed by 8 gram infusion (total Mg dose 10 grams)- Mg less than or equal to 1mg/dL  As Needed      02/20/18 1257    02/20/18 1256  Magnesium Sulfate 6 gram Infusion (2 gm x 3) -Mg 1.1 -1.5 mg/dL  As Needed      02/20/18 1257    02/20/18 1256  magnesium sulfate 4 gram infusion- Mg 1.6-1.9 mg/dL  As Needed      02/20/18 1257    02/20/18 1256  potassium chloride (K-DUR,KLOR-CON) ER tablet 40 mEq  As Needed      02/20/18 1257    02/20/18 1256  potassium chloride (KLOR-CON) packet 40 mEq  As Needed      02/20/18 1257    02/20/18 1256  potassium chloride 10 mEq in 100 mL IVPB  Every 1 Hour PRN      02/20/18 1257    02/20/18 1200  Turn cough deep breathe every 2 hour until ambulatory.  Every 2 Hours      02/20/18 1043    02/20/18 1200  Vital Signs  Every 4 Hours      02/20/18 1043    02/20/18 1200  Neurovascular Checks  Every 4 Hours      02/20/18 1043    02/20/18 1200  Incentive Spirometry  Every 2 Hours While Awake     Comments:  Until lungs are clear and no productive cough.    02/20/18 1043    02/20/18 1115  lactated ringers infusion  Continuous      02/20/18 1043    02/20/18 1115  HYDROmorphone (DILAUDID) injection 0.5 mg  Once      02/20/18 1119    02/20/18 1110  XR Hip With or Without Pelvis 1 View Right  1 Time Imaging      02/20/18 1043    02/20/18 1105  HYDROmorphone  (DILAUDID) injection 0.5 mg  Once      02/20/18 1112    02/20/18 1043  XR Hip With or Without Pelvis 2 - 3 View Right  1 Time Imaging,   Status:  Canceled      02/20/18 1043    02/20/18 1041  ondansetron (ZOFRAN) tablet 4 mg  Every 6 Hours PRN      02/20/18 1043    02/20/18 1041  ondansetron ODT (ZOFRAN-ODT) disintegrating tablet 4 mg  Every 6 Hours PRN      02/20/18 1043    02/20/18 1041  ondansetron (ZOFRAN) injection 4 mg  Every 6 Hours PRN      02/20/18 1043    02/20/18 1041  docusate sodium (COLACE) capsule 100 mg  2 Times Daily PRN      02/20/18 1043    02/20/18 1041  HYDROmorphone (DILAUDID) injection 0.5 mg  Every 2 Hours PRN      02/20/18 1043    02/20/18 1041  naloxone (NARCAN) injection 0.1 mg  Every 5 Minutes PRN      02/20/18 1043    02/20/18 1041  HYDROcodone-acetaminophen (NORCO) 7.5-325 MG per tablet 2 tablet  Every 4 Hours PRN      02/20/18 1043    02/20/18 1039  Diet Regular  Diet Effective Now      02/20/18 1043    02/20/18 1038  Weight Bearing - Partial Weight Bearing  Continuous      02/20/18 1043    02/20/18 1038  Ambulate in [place] [id number] Day of Surgery  Once      02/20/18 1043    02/20/18 1038  Pillows may be used to elevate the operative leg, but DO NOT flex the operative knee over a pillow.  Until Discontinued      02/20/18 1043    02/20/18 1038  Instruct Patient to Do At Least 10 Ankle Pumps Per Hour While Awake  Once     Comments:  Instruct Patient to Do At Least 10 Ankle Pumps Per Hour While Awake    02/20/18 1043    02/20/18 1038  May stand to void or use BSC day of surgery. POD #1 and thereafter use bedside commode or bathroom.  Once      02/20/18 1043    02/20/18 1038  Intake and Output  Every Shift      02/20/18 1043    02/20/18 1038  Saline lock IV with adequate po intake.  Continuous      02/20/18 1043    02/20/18 1038  Oxygen Therapy-  Continuous      02/20/18 1043    02/20/18 1038  Advance diet as tolerated  Until Discontinued      02/20/18 1043    02/20/18 1038  Inpatient Consult to  Hospitalist  Once     Specialty:  Hospitalist  Provider:  Jonathan Antoine, DO    02/20/18 1043    02/20/18 1038  Inpatient Consult to Case Management   Once     Provider:  (Not yet assigned)    02/20/18 1043    02/20/18 1038  PT Consult: Eval & Treat  Once      02/20/18 1043    02/20/18 1038  Full Code  Continuous      02/20/18 1043    02/20/18 1038  VTE Risk Assessment - Moderate Risk  Once      02/20/18 1043    02/20/18 1038  Place Sequential Compression Device  Once      02/20/18 1043    02/20/18 1038  Maintain Sequential Compression Device  Continuous      02/20/18 1043    02/20/18 1036  acetaminophen (TYLENOL) tablet 487.5 mg  Every 6 Hours PRN      02/20/18 1043    02/20/18 1036  traMADol (ULTRAM) tablet 50 mg  Every 6 Hours PRN      02/20/18 1043    02/20/18 1036  albuterol (PROVENTIL) nebulizer solution 0.083% 2.5 mg/3mL  Every 4 Hours PRN      02/20/18 1043    02/20/18 1029  Vital signs every 5 minutes for 15 minutes, every 15 minutes thereafter.  Once,   Status:  Canceled      02/20/18 1028    02/20/18 1029  Call Anesthesiologist for additional IV Fluid bolus for Hypotension/Tachycardia  Until Discontinued,   Status:  Canceled      02/20/18 1028    02/20/18 1029  Notify Anesthesia of Any Acute Changes in Patient Condition  Until Discontinued,   Status:  Canceled      02/20/18 1028    02/20/18 1029  Notify Anesthesia for Unrelieved Pain  Until Discontinued,   Status:  Canceled      02/20/18 1028    02/20/18 1029  Oxygen Therapy- Blow by - Humidified; Titrate for SPO2: equal to or greater than, 96%, per policy  Continuous      02/20/18 1028    02/20/18 1029  Pulse Oximetry, Continuous  Continuous      02/20/18 1028    02/20/18 1029  Once DC criteria to floor met, follow surgeon's orders.  Until Discontinued,   Status:  Canceled      02/20/18 1028    02/20/18 1029  Discharge patient from PACU when discharge criteria is met.  Until Discontinued,   Status:  Canceled      02/20/18 1028     02/20/18 1029  Inpatient Admission  Once      02/20/18 1028    02/20/18 1028  oxyCODONE-acetaminophen (PERCOCET) 5-325 MG per tablet 1 tablet  Once As Needed,   Status:  Discontinued      02/20/18 1028    02/20/18 1028  fentaNYL citrate (PF) (SUBLIMAZE) injection 50 mcg  Every 5 Minutes PRN,   Status:  Discontinued      02/20/18 1028    02/20/18 1028  ipratropium-albuterol (DUO-NEB) nebulizer solution 3 mL  Once As Needed,   Status:  Discontinued      02/20/18 1028    02/20/18 1028  ondansetron (ZOFRAN) injection 4 mg  Once As Needed,   Status:  Discontinued      02/20/18 1028    02/20/18 1028  meperidine (DEMEROL) injection 12.5 mg  Every 5 Minutes PRN,   Status:  Discontinued      02/20/18 1028    02/20/18 1000  Instructions on coughing, deep breathing, and incentive spirometry.  Every 4 Hours While Awake,   Status:  Canceled      02/20/18 0603    02/20/18 0853  bacitracin 150,000 Units, polymyxin B 1,500,000 Units in sodium chloride (NS) 3,000 mL irrigation  As Needed,   Status:  Discontinued      02/20/18 0854    02/20/18 0800  Tranexamic Acid 567 mg in sodium chloride 0.9 % 100 mL  Once      02/20/18 0603    02/20/18 0800  bacitracin 150,000 Units, polymyxin B 1,500,000 Units in sodium chloride (NS) 3,000 mL irrigation  Once      02/20/18 0716    02/20/18 0730  lactated ringers infusion  Continuous,   Status:  Discontinued      02/20/18 0651    02/20/18 0652  Oxygen Therapy- Nasal Cannula; Titrate for SPO2: equal to or greater than, 90%  Continuous,   Status:  Canceled      02/20/18 0651    02/20/18 0652  Insert Peripheral IV  Once,   Status:  Canceled      02/20/18 0651    02/20/18 0652  Saline Lock & Maintain IV Access  Continuous,   Status:  Canceled      02/20/18 0651    02/20/18 0652  May take Beta Blocker from home with sip of water.  Once,   Status:  Canceled      02/20/18 0651    02/20/18 0651  Vital Signs - Per Anesthesia Protocol  As Needed,   Status:  Canceled      02/20/18 0651    02/20/18 0651   Pulse Oximetry, Continuous  As Needed,   Status:  Canceled      02/20/18 0651    02/20/18 0651  sodium chloride 0.9 % flush 1-10 mL  As Needed,   Status:  Discontinued      02/20/18 0651    02/20/18 0645  Tranexamic Acid 567 mg in sodium chloride 0.9 % 100 mL  Once      02/20/18 0603    02/20/18 0645  vancomycin (VANCOCIN) 750 mg in sodium chloride 0.9 % 250 mL IVPB  Once      02/20/18 0603    02/20/18 0603  Follow Anesthesia Guidelines / Standing Orders  Once,   Status:  Canceled      02/20/18 0603    02/20/18 0603  Nerve Block  Once,   Status:  Canceled      02/20/18 0603    02/20/18 0603  Insert Peripheral IV  Once,   Status:  Canceled      02/20/18 0603    02/20/18 0603  Saline Lock & Maintain IV Access  Continuous,   Status:  Canceled      02/20/18 0603    02/20/18 0603  Obtain informed consent (if not collected inpatient or PAT)  Once,   Status:  Canceled      02/20/18 0603    02/20/18 0603  SCD (sequential compression device)- to be placed on patient in Pre-op  Once,   Status:  Canceled      02/20/18 0603    02/20/18 0603  TAN hose- To be placed on patient in pre-op  Once,   Status:  Canceled      02/20/18 0603    02/20/18 0603  Verify NPO Status  Continuous,   Status:  Canceled      02/20/18 0603    02/20/18 0602  sodium chloride 0.9 % flush 1-10 mL  As Needed,   Status:  Discontinued      02/20/18 0603    Unscheduled  Up in Chair x 1 day of surgery, then up in chair x 3 beginning POD #1.  As Needed      02/20/18 1043    Unscheduled  For same day discharge patients, up in chair x2 day of surgery.  As Needed      02/20/18 1043    Unscheduled  Ice to incision for 48 hours, then PRN for edema, after activity or exercise, and to lessen discomfort.  As Needed      02/20/18 1043    Unscheduled  Apply Ice to Incision PRN for Edema, After Activity or Exercise, and to Lessen Discomfort  As Needed      02/20/18 1043    Unscheduled  Change dressing  As Needed     Comments:  May d/c dressing changes when no drainage  from wound.    02/20/18 1043    Unscheduled  Magnesium  As Needed      02/20/18 1257    Unscheduled  Potassium  As Needed      02/20/18 1257    --  celecoxib (CeleBREX) 200 MG capsule  2 Times Daily      02/20/18 1427             Operative/Procedure Notes (most recent note)      Oswaldo Fisher MD at 2/20/2018 10:29 AM  Version 1 of 1         DATE OF SURGERY: 02/20/18    PREOPERATIVE DIAGNOSIS: Osteoarthritis right hip     POSTOPERATIVE DIAGNOSIS: Same     OPERATIONS PERFORMED: Right total hip arthroplasty     SURGEON: Oswaldo Fisher MD      ASSISTANT: Veronica Lua     ANESTHESIA: Gen.     ESTIMATED BLOOD LOSS: 100 cc     ANTIBIOTICS: Vancomycin     DESCRIPTION OF PROCEDURE: With the patient in the operating theater general anesthesia administered she was positioned left lateral decubitus secured with pegboard.  She received 1 g of TXA and 1 g of vancomycin immediately preoperatively.  With the right hip and leg sterilely prepped and draped in usual fashion hip was approached through a posterior incision.  The skin divided tensor fascia darvin and external fascia gluteus sammy were split.  The posterior aspect of the hip was exposed the short external rotators were identified and released the capsule divided in a T fashion and the sciatic nerve protected throughout the procedure.  No head was dislocated and then the femoral neck osteotomized.  Soft tissue was removed from within the acetabulum.  The acetabulum was then progressively reamed beginning with 44 millimeter reamer.  The reamer was placed in 45° of flexion 20° of anteversion.  With cancellus bone exposed acetabular cup was positioned and impacted into place.  The 10° posterior lip liner was positioned.  The femoral neck was re-osteotomized at the appropriate angle.  The proximal femur was then progressively broached after canal finder was positioned.  With 10 mm Greco in place hip was reduced.  The -2 neck length was appropriate and selected.   With trial femoral stem and head and reduced the hip was appropriate length with good stability.  Trial components were removed.  The femoral implant was impacted into place with 20° of anteversion.  Again after trial femoral head was confirmed to be appropriate the femoral head was impacted into place.  It was dry at the completion of the procedure.  It was copiously irrigated the capsule was repaired with #2 Ethibond with adequate stability obtained posteriorly.  The tensor fascia darvin was reapproximated along with gluteus sammy then with running #1 strata fix the fascia was closed.  Soft tissue was reapproximated and then skin final closure was with running 20 strata fix.  Dermabond dressing was applied sterile dressing was applied she was positioned supine transferred to her bed and abduction pillow was positioned.  She was taken to recovery room in stable condition.       Dictator Signature:___________________________  Noah Fisher M.D.                    Electronically signed by Noah Fisher MD at 2018 10:36 AM        Physician Progress Notes (most recent note)     No notes of this type exist for this encounter.           Consult Notes (most recent note)      Terra Spears PA-C at 2018 12:57 PM  Version 1 of 1     Consult Orders:    1. Inpatient Consult to Hospitalist [[id number]] ordered by Noah Fisher MD at 18 1043                Inpatient Consult to Hospitalist  Consult performed by: TERRA SPEARS  Consult ordered by: NOAH FISHER          Patient Identification:  Name:  Lisa Calloway  Age:  78 y.o.  Sex:  female  :  1940  MRN:  [id number]  Visit Number:  [id number]  Primary care provider:  Tobin Acosta MD    History of presenting illness:       Lisa Calloway is 78-year-old  female that was admitted to the hospital following right total hip arthroplasty performed by Dr. Fulton.  She has known history of COPD  reportedly from 2nd hand smoke exposure, diastolic CHF followed by Dr. Rashad Lam with pre-surgical evaluation,  Hypertension, osteoarthritis, and history of MRSA.   A consultation was placed for medical management.  Mrs. Calloway is very pleasant post-operatively.  She denies chest pain, shortness of breath, and palpitations.  She denies dysuria and hematuria.  She denies abdominal pain, nausea. She reports she is currently comfortably post-operatively.  Her home medications reveal Procardia XL 30mg.  It appears she is taking this 3x a day at home, but reports she does frequently hold it due to low blood pressures.     ---------------------------------------------------------------------------------------------------------------------  Review of Systems   Constitutional: Negative for chills, diaphoresis and fever.   HENT: Negative for congestion and drooling.    Eyes: Negative for pain and discharge.   Respiratory: Negative for cough, chest tightness, shortness of breath and wheezing.    Cardiovascular: Negative for chest pain and leg swelling.   Gastrointestinal: Negative for abdominal distention and abdominal pain.   Endocrine: Negative for cold intolerance and heat intolerance.   Genitourinary: Negative for difficulty urinating and dysuria.   Musculoskeletal: Negative for arthralgias and gait problem.   Skin: Negative for color change and pallor.   Neurological: Negative for dizziness and headaches.   Psychiatric/Behavioral: Negative for agitation and confusion.      ---------------------------------------------------------------------------------------------------------------------   Past History:  Family History   Problem Relation Age of Onset   • Congenital heart disease Mother    • Hypertension Mother    • Stroke Mother    • Cancer Other    • Gout Other      Past Medical History:   Diagnosis Date   • Anxiety    • Arthritis    • Calcinosis-Raynaud's sclerodactyly-telangiectasia syndrome    • COPD (chronic  obstructive pulmonary disease)    • Diastolic heart failure 12/9/2016    Echocardiogram, 08/10/2011: LVH, LVEF 70% and no significant valvular abnormality.  Pharmacologic MPS, 07/28/2011: normal, LVEF 80%. Recurrent chest pain/CHF presentations to the [place], [place] area. Cardiac catheterization, 05/23/2012: Normal coronary arteries, LVEF 65%, LVEDP 32 mmHg. Renal angiography, 05/23/2012: normal bilateral renal arteries.  Echocardiogram for worsening dyspnea, 03/ • GERD (gastroesophageal reflux disease)    • Heart murmur    • Hiatal hernia    • High cholesterol    • History of recurrent UTIs    • History of staph infection     after hip replacement   • History of stomach ulcers    • History of transfusion    • Hypertension    • Low back pain    • Lumbar herniated disc     with right-sided sciatica:Status post lumbar fusion, 01/17/2013.   • Migraine    • Panic attacks    • SOB (shortness of breath)    • Wears dentures     upper denture and partial   • Wears eyeglasses      Past Surgical History:   Procedure Laterality Date   • ABDOMINAL SURGERY     • APPENDECTOMY  1975   • BACK SURGERY      lumbar fusion   • BREAST SURGERY      Biopsy   • CARDIAC SURGERY      cardiac cath   • CHOLECYSTECTOMY  1996   • EYE SURGERY      cataract surgery   • HIP SURGERY     • HYSTERECTOMY  1999   • JOINT REPLACEMENT      hip left   • LUMBAR LAMINECTOMY WITH FUSION N/A 3/17/2017    Procedure: LUMBAR FUSION DECOMPRESSON WITH PEDICLE SCREWS L3-4 ;  Surgeon: Sen Skinner MD;  Location: [facility];  Service:    • OTHER SURGICAL HISTORY      arthrodesis lumbar   • OTHER SURGICAL HISTORY  1982    neuroplasty decompression median nerve at carpal tunnel   • TONSILLECTOMY  1963   • TUBAL ABDOMINAL LIGATION  1975     Social History     Social History   • Marital status:      Spouse name: N/A   • Number of children: N/A   • Years of education: N/A     Social History Main Topics   • Smoking status: Never Smoker   • Smokeless tobacco:  Never Used   • Alcohol use No   • Drug use: No   • Sexual activity: Defer     Other Topics Concern   • None     Social History Narrative     ---------------------------------------------------------------------------------------------------------------------   Allergies:  Amlodipine; Beta adrenergic blockers; Ergotamine-caffeine; Lisinopril; Metoprolol; Tenormin [atenolol]; Trovan [trovafloxacin]; Atorvastatin; Cefprozil; Codeine; Contrast dye; Coreg [carvedilol]; Fish-derived products; Penicillins; Sectral [acebutolol hcl]; Sulfa antibiotics; and Tetracycline  ---------------------------------------------------------------------------------------------------------------------   Prior to Admission Medications     Prescriptions Last Dose Informant Patient Reported? Taking?    acetaminophen (TYLENOL) 500 MG tablet 02/20/2018 Self Yes No    Take 500 mg by mouth Every 6 (Six) Hours As Needed for Mild Pain .    amitriptyline (ELAVIL) 10 MG tablet 02/19/2018 Self Yes No    Take 20 mg by mouth Every Night.    aspirin 81 MG tablet 02/09/2018 Self Yes No    Take 1 tablet by mouth Daily. Prior to [facility] Admission, Patient was on: on hold since saturday    baclofen (LIORESAL) 10 MG tablet 02/19/2018 Self Yes No    Take 1 tablet by mouth 3 (Three) Times a Day.    busPIRone (BUSPAR) 10 MG tablet 02/20/2018 Self Yes No    Take 10 mg by mouth 4 (Four) Times a Day.    Calcium-Phosphorus-Vitamin D (CITRACAL +D3 PO) 02/19/2018 Self Yes No    Take 1 tablet by mouth 2 (Two) Times a Day.    fluticasone (FLONASE) 50 MCG/ACT nasal spray 02/19/2018 Self Yes No    1 spray into each nostril Daily.    furosemide (LASIX) 40 MG tablet 02/19/2018 Self No No    Take 1 tablet by mouth Daily for 180 days.    gabapentin (NEURONTIN) 600 MG tablet 02/20/2018 Self Yes No    Take 600 mg by mouth 3 (Three) Times a Day.    loratadine (CLARITIN) 10 MG tablet 02/20/2018 Self Yes No    Take 10 mg by mouth Daily.    Multiple Vitamin (MULTI VITAMIN DAILY)  tablet 02/19/2018 Self Yes No    Take 1 tablet by mouth Daily.    NIFEdipine XL (PROCARDIA XL) 30 MG 24 hr tablet 02/20/2018 Self Yes No    Take 30 mg by mouth 3 (Three) Times a Day.    omeprazole (PriLOSEC) 20 MG capsule 02/20/2018 Self Yes No    Take 20 mg by mouth 2 (Two) Times a Day.    potassium chloride (K-DUR,KLOR-CON) 10 MEQ CR tablet 02/19/2018 Self Yes No    Take 10 mEq by mouth 3 (Three) Times a Day.    PROAIR  (90 BASE) MCG/ACT inhaler 02/20/2018 Self Yes No    Inhale 2 puffs Every 4 (Four) Hours As Needed for Wheezing.    traMADol (ULTRAM) 50 MG tablet 02/20/2018 Self Yes No    Take 50 mg by mouth Every 6 (Six) Hours As Needed for Moderate Pain .        [facility] Meds:    amitriptyline 20 mg Oral Nightly   [START ON 2/21/2018] apixaban 2.5 mg Oral Q12H   aspirin 81 mg Oral Daily   bacitracin + polymyxin in normal saline 3000 mL IRRIGATION  Irrigation Once   baclofen 10 mg Oral TID   busPIRone 10 mg Oral 4x Daily   calcium carb-cholecalciferol 1 tablet Oral Daily   [START ON 2/21/2018] cetirizine 5 mg Oral Daily   fluticasone 1 spray Each Nare Daily   furosemide 40 mg Oral Daily   gabapentin 600 mg Oral Q8H   ipratropium-albuterol 3 mL Nebulization 4x Daily - RT   multivitamin 1 tablet Oral Daily   NIFEdipine CC 30 mg Oral Q24H   [START ON 2/21/2018] pantoprazole 40 mg Oral QAM   vancomycin 15 mg/kg Intravenous Once       lactated ringers 125 mL/hr Last Rate: 125 mL/hr (02/20/18 0657)   lactated ringers 100 mL/hr      ---------------------------------------------------------------------------------------------------------------------   Vital Signs:  Temp:  [97.4 °F (36.3 °C)-98.5 °F (36.9 °C)] 97.7 °F (36.5 °C)  Heart Rate:  [85-93] 90  Resp:  [16-20] 20  BP: (120-158)/(53-77) 134/68  Last 3 weights    02/20/18  0636   Weight: 56.7 kg (125 lb)     Body mass index is 24.41 kg/(m^2).  ---------------------------------------------------------------------------------------------------------------------    Physical exam:  Constitutional:  Very pleasant in post-operative state.  No acute distress.   HENT:  Head: Normocephalic and atraumatic.  Mouth:  Moist mucous membranes.    Eyes:  Conjunctivae and EOM are normal.  Pupils are equal, round, and reactive to light.  No scleral icterus.    Neck:  Neck supple.  No JVD present.    Cardiovascular:  Normal rate, regular rhythm. Normal s1/s2.   No murmur.  Pulmonary/Chest:  No respiratory distress, no wheezes, no crackles, with normal breath sounds and good air movement.  Abdominal:  Soft.  Bowel sounds are present.  No distension and no tenderness.   Musculoskeletal:  Right hip post-op with abductor in place .   Neurological:  Alert and oriented to person, place, and time.  No cranial nerve deficit.  No tongue deviation.  No facial droop.  No slurred speech.   Skin:  Skin is warm and dry. No rash noted.  No pallor.   Psychiatric:  Normal mood and affect.  Behavior is normal.  Judgment and thought content normal.   ---------------------------------------------------------------------------------------------------------------------   EKG:      ---------------------------------------------------------------------------------------------------------------------     Results from last 7 days  Lab Units 02/20/18  1312 02/14/18  0946   WBC 10*3/mm3 11.99 8.00   HEMOGLOBIN g/dL 10.5* 12.4   HEMATOCRIT % 32.8* 38.5   MCV fL 89.9 89.7   MCHC g/dL 32.0* 32.2*   PLATELETS 10*3/mm3 298 332           Results from last 7 days  Lab Units 02/14/18  0946   SODIUM mmol/L 140   POTASSIUM mmol/L 3.3*   CHLORIDE mmol/L 103   CO2 mmol/L 22.3*   BUN mg/dL 20   CREATININE mg/dL 0.90   EGFR IF NONAFRICN AM mL/min/1.73 60*   CALCIUM mg/dL 9.4   GLUCOSE mg/dL 116*   Estimated Creatinine Clearance: 40 mL/min (by C-G formula based on Cr of 0.9).  No results found for: AMMONIA          Lab Results   Component Value Date    HGBA1C 5.80 (H) 03/16/2017     No results found for: TSH, FREET4  No results found  for: PREGTESTUR, PREGSERUM, HCG, HCGQUANT  Pain Management Panel     There is no flowsheet data to display.                        ---------------------------------------------------------------------------------------------------------------------   Imaging Results (last 7 days)     Procedure Component Value Units Date/Time    XR Hip With or Without Pelvis 1 View Right [[id number]] Collected:  02/20/18 1107     Updated:  02/20/18 1111    Narrative:       EXAMINATION: XR HIP W OR WO PELVIS 2-3 VIEW RIGHT-      CLINICAL INDICATION:Postoperative; M25.551-Pain in right hip;  M16.11-Unilateral primary osteoarthritis, right hip; Z01.818-Encounter  for other preprocedural examination        COMPARISON: None      TECHNIQUE: Single view right hip     FINDINGS:   Status post right hip arthroplasty. Anatomic alignment. No radiographic  evidence of hardware complication.           Impression:       Status post right hip arthroplasty without complicating feature  identified.     This report was finalized on 2/20/2018 11:07 AM by Dr. Christian Regan MD.             I have personally reviewed the radiology images and read the final radiology report.  ---------------------------------------------------------------------------------------------------------------------     Assessment and Plan:      -Status post right total hip arthroplasty:  Care per primary.  CBC and BMP are pending at present.     -Hypertension: Hold parameters placed on Procardial XL.  Reduced dose from 30 mg of XL Procardia TID to one dose daily for now with holding parameters.  Hold parameters placed on home Lasix.     -Recent hypokalemia:  Potassium of 3.3 on 2/14 labs.  BMP pending. Electrolyte replacement protocol has been ordered.     -Chronic diastolic CHF:  Will monitor. Will continue Lasix 40 mg daily as recently started by her cardiologist Dr. Rashad Lam. Will place hold parameters.     -DVT prophylaxis:  Eliquis 2.5 mg to start tomorrow per primary.  SCDs in place at present.       Thank you for the opportunity to participate in the care of your patient. Please do not hesitate to call with any questions or concerns.     Nicol Nolen PA-C  02/20/18  1:52 PM  ---------------------------------------------------------------------------------------------------------------------      Electronically signed by Nicol Nolen PA-C at 2/20/2018  1:53 PM        Nutrition Notes (most recent note)     No notes of this type exist for this encounter.           Physical Therapy Notes (most recent note)      Scarlet Samano PT at 2/20/2018  3:23 PM  Version 1 of 1         Problem: Inpatient Physical Therapy  Goal: Transfer Training Goal 1 LTG- PT   02/20/18 1521   Transfer Training PT LTG   Transfer Training PT LTG, Date Established 02/20/18   Transfer Training PT LTG, Time to Achieve by discharge   Transfer Training PT LTG, Activity Type bed to chair /chair to bed;sit to stand/stand to sit   Transfer Training PT LTG, [place] Level contact guard assist;minimum assist (75% patient effort)   Transfer Training PT LTG, Assist Device other (see comments)  (with appropriate AD)     Goal: Gait Training Goal LTG- PT   02/20/18 1521   Gait Training PT LTG   Gait Training Goal PT LTG, Date Established 02/20/18   Gait Training Goal PT LTG, Time to Achieve by discharge   Gait Training Goal PT LTG, [place] Level minimum assist (75% patient effort);contact guard assist   Gait Training Goal PT LTG, Assist Device other (see comments)  (with appropriate AD)   Gait Training Goal PT LTG, Distance to Achieve 40            Electronically signed by Scarlet Samano PT at 2/20/2018  3:23 PM        Occupational Therapy Notes (most recent note)     No notes of this type exist for this encounter.        Speech Language Pathology Notes (most recent note)     No notes of this type exist for this encounter.        Respiratory Therapy Notes (most recent note)     No notes of this  type exist for this encounter.

## 2018-02-21 ENCOUNTER — APPOINTMENT (OUTPATIENT)
Dept: ULTRASOUND IMAGING | Facility: HOSPITAL | Age: 78
End: 2018-02-21

## 2018-02-21 LAB
ALBUMIN SERPL-MCNC: 3.5 G/DL (ref 3.4–4.8)
ALBUMIN/GLOB SERPL: 1.6 G/DL (ref 1.5–2.5)
ALP SERPL-CCNC: 134 U/L (ref 35–104)
ALT SERPL W P-5'-P-CCNC: 508 U/L (ref 10–36)
ANION GAP SERPL CALCULATED.3IONS-SCNC: 7.8 MMOL/L (ref 3.6–11.2)
AST SERPL-CCNC: 385 U/L (ref 10–30)
BASOPHILS # BLD AUTO: 0 10*3/MM3 (ref 0–0.3)
BASOPHILS NFR BLD AUTO: 0 % (ref 0–2)
BILIRUB SERPL-MCNC: 0.3 MG/DL (ref 0.2–1.8)
BUN BLD-MCNC: 19 MG/DL (ref 7–21)
BUN/CREAT SERPL: 24.1 (ref 7–25)
CALCIUM SPEC-SCNC: 8.3 MG/DL (ref 7.7–10)
CHLORIDE SERPL-SCNC: 107 MMOL/L (ref 99–112)
CO2 SERPL-SCNC: 24.2 MMOL/L (ref 24.3–31.9)
CREAT BLD-MCNC: 0.79 MG/DL (ref 0.43–1.29)
DEPRECATED RDW RBC AUTO: 47.9 FL (ref 37–54)
EOSINOPHIL # BLD AUTO: 0 10*3/MM3 (ref 0–0.7)
EOSINOPHIL NFR BLD AUTO: 0 % (ref 0–7)
ERYTHROCYTE [DISTWIDTH] IN BLOOD BY AUTOMATED COUNT: 14.8 % (ref 11.5–14.5)
GFR SERPL CREATININE-BSD FRML MDRD: 70 ML/MIN/1.73
GLOBULIN UR ELPH-MCNC: 2.2 GM/DL
GLUCOSE BLD-MCNC: 145 MG/DL (ref 70–110)
HAV IGM SERPL QL IA: NORMAL
HBV CORE IGM SERPL QL IA: NORMAL
HBV SURFACE AG SERPL QL IA: NORMAL
HCT VFR BLD AUTO: 28 % (ref 37–47)
HCV AB SER DONR QL: NORMAL
HGB BLD-MCNC: 8.9 G/DL (ref 12–16)
IMM GRANULOCYTES # BLD: 0.02 10*3/MM3 (ref 0–0.03)
IMM GRANULOCYTES NFR BLD: 0.2 % (ref 0–0.5)
LYMPHOCYTES # BLD AUTO: 1.67 10*3/MM3 (ref 1–3)
LYMPHOCYTES NFR BLD AUTO: 16.8 % (ref 16–46)
MAGNESIUM SERPL-MCNC: 1.7 MG/DL (ref 1.7–2.6)
MCH RBC QN AUTO: 29.5 PG (ref 27–33)
MCHC RBC AUTO-ENTMCNC: 31.8 G/DL (ref 33–37)
MCV RBC AUTO: 92.7 FL (ref 80–94)
MONOCYTES # BLD AUTO: 1.73 10*3/MM3 (ref 0.1–0.9)
MONOCYTES NFR BLD AUTO: 17.4 % (ref 0–12)
NEUTROPHILS # BLD AUTO: 6.53 10*3/MM3 (ref 1.4–6.5)
NEUTROPHILS NFR BLD AUTO: 65.6 % (ref 40–75)
OSMOLALITY SERPL CALC.SUM OF ELEC: 282.4 MOSM/KG (ref 273–305)
PLATELET # BLD AUTO: 266 10*3/MM3 (ref 130–400)
PMV BLD AUTO: 10.4 FL (ref 6–10)
POTASSIUM BLD-SCNC: 3.9 MMOL/L (ref 3.5–5.3)
PROT SERPL-MCNC: 5.7 G/DL (ref 6–8)
RBC # BLD AUTO: 3.02 10*6/MM3 (ref 4.2–5.4)
SODIUM BLD-SCNC: 139 MMOL/L (ref 135–153)
WBC NRBC COR # BLD: 9.95 10*3/MM3 (ref 4.5–12.5)

## 2018-02-21 PROCEDURE — 83735 ASSAY OF MAGNESIUM: CPT | Performed by: INTERNAL MEDICINE

## 2018-02-21 PROCEDURE — 99232 SBSQ HOSP IP/OBS MODERATE 35: CPT | Performed by: INTERNAL MEDICINE

## 2018-02-21 PROCEDURE — 85025 COMPLETE CBC W/AUTO DIFF WBC: CPT | Performed by: ORTHOPAEDIC SURGERY

## 2018-02-21 PROCEDURE — 80053 COMPREHEN METABOLIC PANEL: CPT | Performed by: INTERNAL MEDICINE

## 2018-02-21 PROCEDURE — 99024 POSTOP FOLLOW-UP VISIT: CPT | Performed by: PHYSICIAN ASSISTANT

## 2018-02-21 PROCEDURE — 97116 GAIT TRAINING THERAPY: CPT

## 2018-02-21 PROCEDURE — 94799 UNLISTED PULMONARY SVC/PX: CPT

## 2018-02-21 PROCEDURE — 76700 US EXAM ABDOM COMPLETE: CPT

## 2018-02-21 PROCEDURE — 25010000002 HYDROMORPHONE PER 4 MG

## 2018-02-21 PROCEDURE — 80074 ACUTE HEPATITIS PANEL: CPT | Performed by: INTERNAL MEDICINE

## 2018-02-21 PROCEDURE — 76700 US EXAM ABDOM COMPLETE: CPT | Performed by: RADIOLOGY

## 2018-02-21 PROCEDURE — 97110 THERAPEUTIC EXERCISES: CPT

## 2018-02-21 RX ORDER — MAGNESIUM SULFATE HEPTAHYDRATE 40 MG/ML
4 INJECTION, SOLUTION INTRAVENOUS ONCE
Status: COMPLETED | OUTPATIENT
Start: 2018-02-21 | End: 2018-02-21

## 2018-02-21 RX ORDER — HYDROMORPHONE HCL 110MG/55ML
PATIENT CONTROLLED ANALGESIA SYRINGE INTRAVENOUS
Status: COMPLETED
Start: 2018-02-21 | End: 2018-02-21

## 2018-02-21 RX ORDER — IPRATROPIUM BROMIDE AND ALBUTEROL SULFATE 2.5; .5 MG/3ML; MG/3ML
3 SOLUTION RESPIRATORY (INHALATION) EVERY 6 HOURS PRN
Status: DISCONTINUED | OUTPATIENT
Start: 2018-02-21 | End: 2018-02-23 | Stop reason: HOSPADM

## 2018-02-21 RX ADMIN — MAGNESIUM SULFATE HEPTAHYDRATE 4 G: 40 INJECTION, SOLUTION INTRAVENOUS at 05:41

## 2018-02-21 RX ADMIN — APIXABAN 2.5 MG: 2.5 TABLET, FILM COATED ORAL at 22:52

## 2018-02-21 RX ADMIN — Medication 1 TABLET: at 08:38

## 2018-02-21 RX ADMIN — APIXABAN 2.5 MG: 2.5 TABLET, FILM COATED ORAL at 08:38

## 2018-02-21 RX ADMIN — BACLOFEN 10 MG: 10 TABLET ORAL at 22:52

## 2018-02-21 RX ADMIN — HYDROCODONE BITARTRATE AND ACETAMINOPHEN 2 TABLET: 7.5; 325 TABLET ORAL at 22:02

## 2018-02-21 RX ADMIN — GABAPENTIN 600 MG: 300 CAPSULE ORAL at 22:51

## 2018-02-21 RX ADMIN — PANTOPRAZOLE SODIUM 40 MG: 40 TABLET, DELAYED RELEASE ORAL at 05:41

## 2018-02-21 RX ADMIN — ASPIRIN 81 MG: 81 TABLET ORAL at 08:38

## 2018-02-21 RX ADMIN — HYDROCODONE BITARTRATE AND ACETAMINOPHEN 2 TABLET: 7.5; 325 TABLET ORAL at 08:37

## 2018-02-21 RX ADMIN — HYDROMORPHONE HYDROCHLORIDE 0.5 MG: 2 INJECTION, SOLUTION INTRAMUSCULAR; INTRAVENOUS; SUBCUTANEOUS at 03:00

## 2018-02-21 RX ADMIN — FUROSEMIDE 40 MG: 40 TABLET ORAL at 08:38

## 2018-02-21 RX ADMIN — BUSPIRONE HYDROCHLORIDE 10 MG: 10 TABLET ORAL at 17:57

## 2018-02-21 RX ADMIN — GABAPENTIN 600 MG: 300 CAPSULE ORAL at 05:41

## 2018-02-21 RX ADMIN — GABAPENTIN 600 MG: 300 CAPSULE ORAL at 13:43

## 2018-02-21 RX ADMIN — HYDROCODONE BITARTRATE AND ACETAMINOPHEN 2 TABLET: 7.5; 325 TABLET ORAL at 13:44

## 2018-02-21 RX ADMIN — POTASSIUM CHLORIDE 40 MEQ: 1500 TABLET, EXTENDED RELEASE ORAL at 00:17

## 2018-02-21 RX ADMIN — HYDROCODONE BITARTRATE AND ACETAMINOPHEN 2 TABLET: 7.5; 325 TABLET ORAL at 18:01

## 2018-02-21 RX ADMIN — BUSPIRONE HYDROCHLORIDE 10 MG: 10 TABLET ORAL at 22:52

## 2018-02-21 RX ADMIN — AMITRIPTYLINE HYDROCHLORIDE 20 MG: 10 TABLET, FILM COATED ORAL at 22:51

## 2018-02-21 RX ADMIN — MUPIROCIN: 20 OINTMENT TOPICAL at 09:19

## 2018-02-21 RX ADMIN — NIFEDIPINE 30 MG: 30 TABLET, EXTENDED RELEASE ORAL at 08:38

## 2018-02-21 RX ADMIN — CETIRIZINE HYDROCHLORIDE 5 MG: 10 TABLET ORAL at 08:38

## 2018-02-21 RX ADMIN — CELECOXIB 200 MG: 200 CAPSULE ORAL at 22:52

## 2018-02-21 RX ADMIN — BACLOFEN 10 MG: 10 TABLET ORAL at 08:38

## 2018-02-21 RX ADMIN — IPRATROPIUM BROMIDE AND ALBUTEROL SULFATE 3 ML: .5; 3 SOLUTION RESPIRATORY (INHALATION) at 07:51

## 2018-02-21 RX ADMIN — BUSPIRONE HYDROCHLORIDE 10 MG: 10 TABLET ORAL at 08:38

## 2018-02-21 RX ADMIN — BACLOFEN 10 MG: 10 TABLET ORAL at 17:58

## 2018-02-21 RX ADMIN — CELECOXIB 200 MG: 200 CAPSULE ORAL at 08:38

## 2018-02-21 NOTE — PROGRESS NOTES
Pharmacy was consulted to review patients home and hospital medication list for any medications that may cause elevation of LFTs.    Eliquis, tramadol and Buspar are reported to cause increase in LFTs less than 1% of the time.  Zyrtec is listed at less than 2%, Protonix at less than 4% and Celebrex at less than 6%.    Aspirin and Neurontin are listed as possibly causing increase in LFTs but the frequency is not defined.    Norco could be changed to plain oxycodone to avoid the use of Tylenol, which is known to increase LFTs.    Celebrex could be changed to Mobic which is listed at less than 2% chance of increase in LFTs.  Please notify pharmacy if we can be of further assistance.  Thank you.

## 2018-02-21 NOTE — PLAN OF CARE
Problem: Pain, Acute (Adult)  Goal: Acceptable Pain Control/Comfort Level  Outcome: Ongoing (interventions implemented as appropriate)      Problem: Fall Risk (Adult)  Goal: Absence of Falls  Outcome: Ongoing (interventions implemented as appropriate)      Problem: Hip Replacement, Total (Adult)  Goal: Signs and Symptoms of Listed Potential Problems Will be Absent or Manageable (Hip Replacement, Total)  Outcome: Ongoing (interventions implemented as appropriate)

## 2018-02-21 NOTE — PROGRESS NOTES
Discharge Planning Assessment   Miky     Patient Name: Lisa Calloway  MRN: [id number]  Today's Date: 2/21/2018    Admit Date: 2/20/2018       Discharge Plan       02/21/18 1321    Case Management/Social Work Plan    Plan SS attempted contact with Lovely at [facility] and left voice message. SS to follow.     15:43 SS received call from Lovely at [facility] who states pt's information has been submitted to insurance. SS faxed PT notes to [facility] today. SS ask lead nurseArianna to get a OT consult. SS to follow.         Discharge Placement     Facility/Agency Request Status Selected? Address Phone Number Fax Number    [facility] Pending - No Request Sent     [address], [place] [address] [phone]                 Miracle Clark

## 2018-02-21 NOTE — PLAN OF CARE
Problem: Patient Care Overview (Adult)  Goal: Plan of Care Review  Outcome: Ongoing (interventions implemented as appropriate)   02/21/18 0117   Coping/Psychosocial Response Interventions   Plan Of Care Reviewed With patient   Patient Care Overview   Progress no change       Problem: Pain, Acute (Adult)  Goal: Identify Related Risk Factors and Signs and Symptoms  Outcome: Outcome(s) achieved Date Met: 02/21/18      Problem: Fall Risk (Adult)  Goal: Identify Related Risk Factors and Signs and Symptoms  Outcome: Outcome(s) achieved Date Met: 02/21/18 02/21/18 0117   Fall Risk   Fall Risk: Related Risk Factors age-related changes;confusion/agitation;history of falls;impaired vision;environment unfamiliar   Fall Risk: Signs and Symptoms presence of risk factors     Goal: Absence of Falls  Outcome: Ongoing (interventions implemented as appropriate)   02/21/18 0117   Fall Risk (Adult)   Absence of Falls making progress toward outcome

## 2018-02-21 NOTE — DISCHARGE PLACEMENT REQUEST
"Lisa Burkett (78 y.o. Female)     Date of Birth Social Security Number Address Home Phone MRN    1940  [address] [place] [id number] [phone] [id number]    Zoroastrian Marital Status          Zoroastrian        Admission Date Admission Type Admitting Provider Attending Provider Department, Room/Bed    18 Elective Oswaldo Fisher MD [place]has[place], Ronald Keith, MD [address], [address]/    Discharge Date Discharge Disposition Discharge Destination                      Attending Provider: Oswaldo Fisher MD     Allergies:  Amlodipine, Beta Adrenergic Blockers, Ergotamine-caffeine, Lisinopril, Metoprolol, Tenormin [Atenolol], Trovan [Trovafloxacin], Atorvastatin, Cefprozil, Codeine, Contrast Dye, Coreg [Carvedilol], Fish-derived Products, Penicillins, Sectral [Acebutolol Hcl], Sulfa Antibiotics, Tetracycline    Isolation:  Contact   Infection:  MRSA (17)   Code Status:  FULL    Ht:  152.4 cm (60\")   Wt:  56.7 kg (125 lb)    Admission Cmt:  None   Principal Problem:  None                Active Insurance as of 2018     Primary Coverage     Payor Plan Insurance Group Employer/Plan Group    [facility] MEDICARE REPLACEMENT [facility] [id number]     Payor Plan Address Payor Plan Phone Number Effective From Effective To    [address] [address]  [address]     [place] [address]       Subscriber Name Subscriber Birth Date Member ID       LISA BURKETT 1940 [id number]                 Emergency Contacts      (Rel.) Home Phone Work Phone Mobile Phone    Sg Dooley! (Relative) -- -- [phone]    Renita Ariza (Friend) [phone] -- --               Physical Therapy Notes (most recent note)      Scarlet Samano, PT at 2018  2:51 PM  Version 1 of 1         Acute Care - Physical Therapy Treatment Note  [facility]     Patient Name: Lisa Burkett  : 1940  MRN: [id number]  Today's Date: 2018  Onset of Illness/Injury " or Date of Surgery Date: 02/20/18  Date of Referral to PT: 02/20/18  Referring Physician: Phillip    Admit Date: 2/20/2018    Visit Dx:    ICD-10-CM ICD-9-CM   1. Right hip pain M25.551 719.45   2. Primary osteoarthritis of right hip M16.11 715.15   3. Pre-op testing Z01.818 V72.84     Patient Active Problem List   Diagnosis   • Essential hypertension   • Osteoarthritis (arthritis due to wear and tear of joints)   • Raynaud disease   • Anxiety   • COPD (chronic obstructive pulmonary disease)   • GERD (gastroesophageal reflux disease)   • Chronic diastolic heart failure   • Palpitations   • Polypharmacy   • Spondylolisthesis, lumbar region   • Spinal stenosis, lumbar region, with neurogenic claudication   • Spondylolisthesis, acquired   • Acquired spondylolisthesis   • Primary osteoarthritis of right hip   • Localized edema   • Right hip pain               Adult Rehabilitation Note       02/21/18 1444          Rehab Assessment/Intervention    Discipline physical therapist  -CT      Document Type therapy note (daily note)   AM treatment only  -CT      Subjective Information agree to therapy;complains of;pain  -CT      Patient Effort, Rehab Treatment fair  -CT      Precautions/Limitations fall precautions;oxygen therapy device and L/min;hip precautions- right  -CT      Patient Response to Treatment Pt tolerated AM treatment session well with rest break provided as needed. Pt declined PM treatment session d/t increase pain.   -CT      Recorded by [CT] Scarlet Samano PT      Pain Assessment    Pain Assessment 0-10  -CT      Pain Score 8  -CT      Pain Type Surgical pain  -CT      Pain Location Hip  -CT      Pain Orientation Right  -CT      Pain Intervention(s) Repositioned;Rest  -CT      Recorded by [CT] Scarlet Samano PT      Cognitive Assessment/Intervention    Current Cognitive/Communication Assessment functional  -CT      Orientation Status oriented x 4  -CT      Personal Safety Interventions fall prevention  program maintained;gait belt;muscle strengthening facilitated;nonskid shoes/slippers when out of bed  -CT      Recorded by [CT] Scarlet Samano PT      Mobility Assessment/Training    Right Lower Extremity Weight-Bearing partial weight-bearing  -CT      Recorded by [CT] Scarlet Samano PT      Bed Mobility, Assessment/Treatment    Bed Mobility, Assistive Device bed rails  -CT      Bed Mobility, Scoot/Bridge, [place] moderate assist (50% patient effort);2 person assist required  -CT      Bed Mob, Supine to Sit, [place] moderate assist (50% patient effort);2 person assist required  -CT      Bed Mobility, Safety Issues decreased use of legs for bridging/pushing  -CT      Bed Mobility, Impairments strength decreased;ROM decreased  -CT      Recorded by [CT] Scarlet Samano PT      Transfer Assessment/Treatment    Transfers, Bed-Chair [place] minimum assist (75% patient effort);moderate assist (50% patient effort);2 person assist required;verbal cues required;nonverbal cues required (demo/gesture)  -CT      Transfers, Chair-Bed [place] minimum assist (75% patient effort);moderate assist (50% patient effort);2 person assist required;verbal cues required;nonverbal cues required (demo/gesture)  -CT      Transfers, Bed-Chair-Bed, Assist Device rolling walker  -CT      Transfers, Sit-Stand [place] minimum assist (75% patient effort);moderate assist (50% patient effort);2 person assist required;verbal cues required;nonverbal cues required (demo/gesture)  -CT      Transfers, Stand-Sit [place] minimum assist (75% patient effort);moderate assist (50% patient effort);2 person assist required;verbal cues required;nonverbal cues required (demo/gesture)  -CT      Transfers, Sit-Stand-Sit, Assist Device rolling walker  -CT      Transfer, Safety Issues step length decreased;balance decreased during turns  -CT      Recorded by [CT] Scarlet Samano, EDDI      Gait Assessment/Treatment    Gait, [place] Level  minimum assist (75% patient effort);moderate assist (50% patient effort);2 person assist required;verbal cues required;nonverbal cues required (demo/gesture)  -CT      Gait, Assistive Device rolling walker  -CT      Gait, Distance (Feet) 10  -CT      Recorded by [CT] Scarlet Samano PT      Therapy Exercises    Bilateral Lower Extremities AROM:;10 reps;sitting  -CT      Recorded by [CT] Scarelt Samano PT      Positioning and Restraints    Pre-Treatment Position in bed  -CT      Post Treatment Position chair  -CT      In Chair sitting;call light within reach;encouraged to call for assist;with family/caregiver;notified nsg  -CT      Recorded by [CT] Scarlet Samano PT        User Key  (r) = Recorded By, (t) = Taken By, (c) = Cosigned By    Initials Name Effective Dates    CT Scarlet Samano PT 03/14/16 -                 IP PT Goals       02/20/18 1521          Transfer Training PT LTG    Transfer Training PT LTG, Date Established 02/20/18  -CT      Transfer Training PT LTG, Time to Achieve by discharge  -CT      Transfer Training PT LTG, Activity Type bed to chair /chair to bed;sit to stand/stand to sit  -CT      Transfer Training PT LTG, [place] Level contact guard assist;minimum assist (75% patient effort)  -CT      Transfer Training PT LTG, Assist Device other (see comments)   with appropriate AD  -CT      Gait Training PT LTG    Gait Training Goal PT LTG, Date Established 02/20/18  -CT      Gait Training Goal PT LTG, Time to Achieve by discharge  -CT      Gait Training Goal PT LTG, [place] Level minimum assist (75% patient effort);contact guard assist  -CT      Gait Training Goal PT LTG, Assist Device other (see comments)   with appropriate AD  -CT      Gait Training Goal PT LTG, Distance to Achieve 40  -CT        User Key  (r) = Recorded By, (t) = Taken By, (c) = Cosigned By    Initials Name Provider Type    CT Scarlet Samano PT Physical Therapist          Physical Therapy Education     Title: PT OT  SLP Therapies (Active)     Topic: Physical Therapy (Done)     Point: Mobility training (Done)    Learning Progress Summary    Learner Readiness Method Response Comment Documented by Status   Patient Acceptance E VU  CT 02/21/18 1449 Done    Acceptance E VU  CT 02/20/18 1523 Done               Point: Home exercise program (Done)    Learning Progress Summary    Learner Readiness Method Response Comment Documented by Status   Patient Acceptance E VU  CT 02/21/18 1449 Done    Acceptance E VU  CT 02/20/18 1523 Done               Point: Body mechanics (Done)    Learning Progress Summary    Learner Readiness Method Response Comment Documented by Status   Patient Acceptance E VU  CT 02/21/18 1449 Done    Acceptance E VU  CT 02/20/18 1523 Done               Point: Precautions (Done)    Learning Progress Summary    Learner Readiness Method Response Comment Documented by Status   Patient Acceptance E VU  CT 02/21/18 1449 Done    Acceptance E VU  CT 02/20/18 1523 Done                      User Key     Initials Effective Dates Name Provider Type Discipline    CT 03/14/16 -  Scarlet Samano, PT Physical Therapist PT                    PT Recommendation and Plan  Anticipated Equipment Needs At Discharge:  (to be determined)  Anticipated Discharge Disposition: skilled nursing facility (per pt request)  Planned Therapy Interventions: balance training, bed mobility training, gait training, home exercise program, manual therapy techniques, motor coordination training, neuromuscular re-education, patient/family education, postural re-education, ROM (Range of Motion), strengthening, transfer training  PT Frequency: 2 times/day, 5 times/wk, per priority policy             Outcome Measures       02/21/18 1400 02/20/18 1500       How much help from another person do you currently need...    Turning from your back to your side while in flat bed without using bedrails? 3  -CT 3  -CT     Moving from lying on back to sitting on the side of a  flat bed without bedrails? 2  -CT 2  -CT     Moving to and from a bed to a chair (including a wheelchair)? 3  -CT 3  -CT     Standing up from a chair using your arms (e.g., wheelchair, bedside chair)? 3  -CT 3  -CT     Climbing 3-5 steps with a railing? 2  -CT 2  -CT     To walk in hospital room? 2  -CT 2  -CT     AM-PAC 6 Clicks Score 15  -CT 15  -CT     Functional Assessment    Outcome Measure Options AM-PAC 6 Clicks Basic Mobility (PT)  -CT AM-PAC 6 Clicks Basic Mobility (PT)  -CT       User Key  (r) = Recorded By, (t) = Taken By, (c) = Cosigned By    Initials Name Provider Type    CT Scarlet Samano PT Physical Therapist           Time Calculation:         PT Charges       02/21/18 1449          Time Calculation    PT Received On 02/21/18  -CT      PT - Next Appointment 02/22/18  -CT      PT Goal Re-Cert Due Date 03/06/18  -CT      Time Calculation- PT    Total Timed Code Minutes- PT 35 minute(s)  -CT        User Key  (r) = Recorded By, (t) = Taken By, (c) = Cosigned By    Initials Name Provider Type    CT Scarlet Samano PT Physical Therapist          Therapy Charges for Today     Code Description Service Date Service Provider Modifiers Qty    67020707804 HC PT MOBILITY CURRENT 2/20/2018 Scarlet Samano, PT GP, CK 1    48830077847 HC PT MOBILITY PROJECTED 2/20/2018 Scarlet Samano, PT GP, CJ 1    77060795927 HC PT EVAL HIGH COMPLEXITY 2 2/20/2018 Scarlet Samano, PT GP 1    51665347572 HC PT THERAPEUTIC ACT EA 15 MIN 2/20/2018 Scarlet Samano, PT GP 1    06407104278 HC GAIT TRAINING EA 15 MIN 2/20/2018 Scarlet Samano, PT GP 1    96946428631 HC PT THER SUPP EA 15 MIN 2/20/2018 Scarlet Samano, PT GP 4    02541205123 HC GAIT TRAINING EA 15 MIN 2/21/2018 Scarlet Samano, PT GP 1    21278398045 HC PT THER PROC EA 15 MIN 2/21/2018 Scarlet Samano, PT GP 1    31938808321 HC PT THER SUPP EA 15 MIN 2/21/2018 Scarlet Samano, PT GP 2          PT G-Codes  Outcome Measure Options: AM-PAC 6 Clicks Basic Mobility (PT)  Score:  15  Functional Limitation: Mobility: Walking and moving around  Mobility: Walking and Moving Around Current Status (): At least 40 percent but less than 60 percent impaired, limited or restricted  Mobility: Walking and Moving Around Goal Status (): At least 20 percent but less than 40 percent impaired, limited or restricted    Scarlet Samano, PT  2/21/2018          Electronically signed by Scarlet Samano PT at 2/21/2018  2:51 PM

## 2018-02-21 NOTE — THERAPY TREATMENT NOTE
Acute Care - Physical Therapy Treatment Note   [facility]     Patient Name: Lisa Calloway  : 1940  MRN: [id number]  Today's Date: 2018  Onset of Illness/Injury or Date of Surgery Date: 18  Date of Referral to PT: 18  Referring Physician: Phillip    Admit Date: 2018    Visit Dx:    ICD-10-CM ICD-9-CM   1. Right hip pain M25.551 719.45   2. Primary osteoarthritis of right hip M16.11 715.15   3. Pre-op testing Z01.818 V72.84     Patient Active Problem List   Diagnosis   • Essential hypertension   • Osteoarthritis (arthritis due to wear and tear of joints)   • Raynaud disease   • Anxiety   • COPD (chronic obstructive pulmonary disease)   • GERD (gastroesophageal reflux disease)   • Chronic diastolic heart failure   • Palpitations   • Polypharmacy   • Spondylolisthesis, lumbar region   • Spinal stenosis, lumbar region, with neurogenic claudication   • Spondylolisthesis, acquired   • Acquired spondylolisthesis   • Primary osteoarthritis of right hip   • Localized edema   • Right hip pain               Adult Rehabilitation Note       18 1444          Rehab Assessment/Intervention    Discipline physical therapist  -CT      Document Type therapy note (daily note)   AM treatment only  -CT      Subjective Information agree to therapy;complains of;pain  -CT      Patient Effort, Rehab Treatment fair  -CT      Precautions/Limitations fall precautions;oxygen therapy device and L/min;hip precautions- right  -CT      Patient Response to Treatment Pt tolerated AM treatment session well with rest break provided as needed. Pt declined PM treatment session d/t increase pain.   -CT      Recorded by [CT] Scarlet Samano PT      Pain Assessment    Pain Assessment 0-10  -CT      Pain Score 8  -CT      Pain Type Surgical pain  -CT      Pain Location Hip  -CT      Pain Orientation Right  -CT      Pain Intervention(s) Repositioned;Rest  -CT      Recorded by [CT] Scarlet Samano PT      Cognitive  Assessment/Intervention    Current Cognitive/Communication Assessment functional  -CT      Orientation Status oriented x 4  -CT      Personal Safety Interventions fall prevention program maintained;gait belt;muscle strengthening facilitated;nonskid shoes/slippers when out of bed  -CT      Recorded by [CT] Scarlet Samano, PT      Mobility Assessment/Training    Right Lower Extremity Weight-Bearing partial weight-bearing  -CT      Recorded by [CT] Scarlet Samano, PT      Bed Mobility, Assessment/Treatment    Bed Mobility, Assistive Device bed rails  -CT      Bed Mobility, Scoot/Bridge, [place] moderate assist (50% patient effort);2 person assist required  -CT      Bed Mob, Supine to Sit, [place] moderate assist (50% patient effort);2 person assist required  -CT      Bed Mobility, Safety Issues decreased use of legs for bridging/pushing  -CT      Bed Mobility, Impairments strength decreased;ROM decreased  -CT      Recorded by [CT] Scarlet Samano, PT      Transfer Assessment/Treatment    Transfers, Bed-Chair [place] minimum assist (75% patient effort);moderate assist (50% patient effort);2 person assist required;verbal cues required;nonverbal cues required (demo/gesture)  -CT      Transfers, Chair-Bed [place] minimum assist (75% patient effort);moderate assist (50% patient effort);2 person assist required;verbal cues required;nonverbal cues required (demo/gesture)  -CT      Transfers, Bed-Chair-Bed, Assist Device rolling walker  -CT      Transfers, Sit-Stand [place] minimum assist (75% patient effort);moderate assist (50% patient effort);2 person assist required;verbal cues required;nonverbal cues required (demo/gesture)  -CT      Transfers, Stand-Sit [place] minimum assist (75% patient effort);moderate assist (50% patient effort);2 person assist required;verbal cues required;nonverbal cues required (demo/gesture)  -CT      Transfers, Sit-Stand-Sit, Assist Device rolling walker  -CT       Transfer, Safety Issues step length decreased;balance decreased during turns  -CT      Recorded by [CT] Scarlet Samano PT      Gait Assessment/Treatment    Gait, [place] Level minimum assist (75% patient effort);moderate assist (50% patient effort);2 person assist required;verbal cues required;nonverbal cues required (demo/gesture)  -CT      Gait, Assistive Device rolling walker  -CT      Gait, Distance (Feet) 10  -CT      Recorded by [CT] Scarlet Samano PT      Therapy Exercises    Bilateral Lower Extremities AROM:;10 reps;sitting  -CT      Recorded by [CT] Scarlet Samano PT      Positioning and Restraints    Pre-Treatment Position in bed  -CT      Post Treatment Position chair  -CT      In Chair sitting;call light within reach;encouraged to call for assist;with family/caregiver;notified nsg  -CT      Recorded by [CT] Scarlet Samano PT        User Key  (r) = Recorded By, (t) = Taken By, (c) = Cosigned By    Initials Name Effective Dates    CT Scarlet Samano, EDDI 03/14/16 -                 IP PT Goals       02/20/18 1521          Transfer Training PT LTG    Transfer Training PT LTG, Date Established 02/20/18  -CT      Transfer Training PT LTG, Time to Achieve by discharge  -CT      Transfer Training PT LTG, Activity Type bed to chair /chair to bed;sit to stand/stand to sit  -CT      Transfer Training PT LTG, [place] Level contact guard assist;minimum assist (75% patient effort)  -CT      Transfer Training PT LTG, Assist Device other (see comments)   with appropriate AD  -CT      Gait Training PT LTG    Gait Training Goal PT LTG, Date Established 02/20/18  -CT      Gait Training Goal PT LTG, Time to Achieve by discharge  -CT      Gait Training Goal PT LTG, [place] Level minimum assist (75% patient effort);contact guard assist  -CT      Gait Training Goal PT LTG, Assist Device other (see comments)   with appropriate AD  -CT      Gait Training Goal PT LTG, Distance to Achieve 40  -CT        User Key   (r) = Recorded By, (t) = Taken By, (c) = Cosigned By    Initials Name Provider Type    CT Scarlet Samano PT Physical Therapist          Physical Therapy Education     Title: PT OT SLP Therapies (Active)     Topic: Physical Therapy (Done)     Point: Mobility training (Done)    Learning Progress Summary    Learner Readiness Method Response Comment Documented by Status   Patient Acceptance E VU  CT 02/21/18 1449 Done    Acceptance E VU  CT 02/20/18 1523 Done               Point: Home exercise program (Done)    Learning Progress Summary    Learner Readiness Method Response Comment Documented by Status   Patient Acceptance E VU  CT 02/21/18 1449 Done    Acceptance E VU  CT 02/20/18 1523 Done               Point: Body mechanics (Done)    Learning Progress Summary    Learner Readiness Method Response Comment Documented by Status   Patient Acceptance E   CT 02/21/18 1449 Done    Acceptance E   CT 02/20/18 1523 Done               Point: Precautions (Done)    Learning Progress Summary    Learner Readiness Method Response Comment Documented by Status   Patient Acceptance E VU  CT 02/21/18 1449 Done    Acceptance E   CT 02/20/18 1523 Done                      User Key     Initials Effective Dates Name Provider Type Discipline    CT 03/14/16 -  Scarlet Samano PT Physical Therapist PT                    PT Recommendation and Plan  Anticipated Equipment Needs At Discharge:  (to be determined)  Anticipated Discharge Disposition: skilled nursing facility (per pt request)  Planned Therapy Interventions: balance training, bed mobility training, gait training, home exercise program, manual therapy techniques, motor coordination training, neuromuscular re-education, patient/family education, postural re-education, ROM (Range of Motion), strengthening, transfer training  PT Frequency: 2 times/day, 5 times/wk, per priority policy             Outcome Measures       02/21/18 1400 02/20/18 1500       How much help from another person  do you currently need...    Turning from your back to your side while in flat bed without using bedrails? 3  -CT 3  -CT     Moving from lying on back to sitting on the side of a flat bed without bedrails? 2  -CT 2  -CT     Moving to and from a bed to a chair (including a wheelchair)? 3  -CT 3  -CT     Standing up from a chair using your arms (e.g., wheelchair, bedside chair)? 3  -CT 3  -CT     Climbing 3-5 steps with a railing? 2  -CT 2  -CT     To walk in hospital room? 2  -CT 2  -CT     AM-PAC 6 Clicks Score 15  -CT 15  -CT     Functional Assessment    Outcome Measure Options AM-PAC 6 Clicks Basic Mobility (PT)  -CT AM-PAC 6 Clicks Basic Mobility (PT)  -CT       User Key  (r) = Recorded By, (t) = Taken By, (c) = Cosigned By    Initials Name Provider Type    CT Scarlet Samano PT Physical Therapist           Time Calculation:         PT Charges       02/21/18 1449          Time Calculation    PT Received On 02/21/18  -CT      PT - Next Appointment 02/22/18  -CT      PT Goal Re-Cert Due Date 03/06/18  -CT      Time Calculation- PT    Total Timed Code Minutes- PT 35 minute(s)  -CT        User Key  (r) = Recorded By, (t) = Taken By, (c) = Cosigned By    Initials Name Provider Type    CT Scarlet Samano PT Physical Therapist          Therapy Charges for Today     Code Description Service Date Service Provider Modifiers Qty    76704638676 HC PT MOBILITY CURRENT 2/20/2018 Scarlet Samano, PT GP, CK 1    87499052090 HC PT MOBILITY PROJECTED 2/20/2018 Scarlet Samano PT GP, CJ 1    86279217269 HC PT EVAL HIGH COMPLEXITY 2 2/20/2018 Scarlet Samano PT GP 1    47854441833 HC PT THERAPEUTIC ACT EA 15 MIN 2/20/2018 Scarlet Samano PT GP 1    08001168356 HC GAIT TRAINING EA 15 MIN 2/20/2018 Scarlet Samano, PT GP 1    42273854769 HC PT THER SUPP EA 15 MIN 2/20/2018 Scarlet Samano PT GP 4    48609285495 HC GAIT TRAINING EA 15 MIN 2/21/2018 Scarlet Samano, PT GP 1    77174739460 HC PT THER PROC EA 15 MIN 2/21/2018 Scarlet Samano,  PT GP 1    88949988987 HC PT THER SUPP EA 15 MIN 2/21/2018 Scarlet Samano, PT GP 2          PT G-Codes  Outcome Measure Options: AM-PAC 6 Clicks Basic Mobility (PT)  Score: 15  Functional Limitation: Mobility: Walking and moving around  Mobility: Walking and Moving Around Current Status (): At least 40 percent but less than 60 percent impaired, limited or restricted  Mobility: Walking and Moving Around Goal Status (): At least 20 percent but less than 40 percent impaired, limited or restricted    Scarlet Samano, PT  2/21/2018

## 2018-02-21 NOTE — PROGRESS NOTES
Subjective     History:   Lisa Calloway is a 78 y.o. female admitted on 2/20/2018 secondary to Right hip pain     Procedures:   2/20/18: Right ANUPAMA    Patient seen and examined with JAGDEEP Weston. Awake and alert. Currently sitting in bedside chair. Reports some right hip pain but improving. Denies CP, dyspnea or palpitations. (+) flatus, no BM. No acute events overnight per RN.     History taken from: patient, chart, and RN.      Objective     Vital Signs  Temp:  [98.1 °F (36.7 °C)-99.1 °F (37.3 °C)] 99.1 °F (37.3 °C)  Heart Rate:  [73-98] 95  Resp:  [18-20] 18  BP: (137-160)/(50-74) 137/50    Intake/Output Summary (Last 24 hours) at 02/21/18 1554  Last data filed at 02/21/18 1500   Gross per 24 hour   Intake             1320 ml   Output                0 ml   Net             1320 ml         Physical Exam:  General:    Awake, alert, in no acute distress   Heart:      Normal S1 and S2. Regular rate and rhythm. (+) JELENA   Lungs:     Respirations regular, even and unlabored. Lungs clear to auscultation B/L. No wheezes, rales or rhonchi.   Abdomen:   Soft and nontender. No guarding, rebound tenderness or  organomegaly noted. Bowel sounds present x 4.   Extremities:  Tr LE edema noted, R>L. Moves UE and LE equally B/L.     Results Review:      Results from last 7 days  Lab Units 02/21/18  0126 02/20/18  1312   WBC 10*3/mm3 9.95 11.99   HEMOGLOBIN g/dL 8.9* 10.5*   PLATELETS 10*3/mm3 266 298       Results from last 7 days  Lab Units 02/21/18  0126 02/20/18  2214 02/20/18  1312   SODIUM mmol/L 139  --  140   POTASSIUM mmol/L 3.9 3.7 3.4*   CHLORIDE mmol/L 107  --  106   CO2 mmol/L 24.2*  --  26.1   BUN mg/dL 19  --  21   CREATININE mg/dL 0.79  --  0.73   CALCIUM mg/dL 8.3  --  8.4   GLUCOSE mg/dL 145*  --  169*       Results from last 7 days  Lab Units 02/21/18  0126   BILIRUBIN mg/dL 0.3   ALK PHOS U/L 134*   AST (SGOT) U/L 385*   ALT (SGPT) U/L 508*       Results from last 7 days  Lab Units 02/21/18  0126   MAGNESIUM mg/dL  1.7               Imaging Results (last 24 hours)     ** No results found for the last 24 hours. **            Medications:    amitriptyline 20 mg Oral Nightly   apixaban 2.5 mg Oral Q12H   aspirin 81 mg Oral Daily   bacitracin + polymyxin in normal saline 3000 mL IRRIGATION  Irrigation Once   baclofen 10 mg Oral TID   busPIRone 10 mg Oral 4x Daily   calcium carb-cholecalciferol 1 tablet Oral Daily   celecoxib 200 mg Oral BID   cetirizine 5 mg Oral Daily   fluticasone 1 spray Each Nare Daily   furosemide 40 mg Oral Daily   gabapentin 600 mg Oral Q8H   multivitamin 1 tablet Oral Daily   NIFEdipine CC 30 mg Oral Q24H   pantoprazole 40 mg Oral QAM              Assessment/Plan   -Right hip OA s/p right ANUPAMA: Cont care per primary team. PT/OT. Pain control.       -Essential HTN: BP currently stable. Cont current dose of Procardia (reduced from home dose). Cont to monitor.      -Hypokalemia: Improved today. Mg is <2. Cont electrolyte replacement protocols and repeat labs in the AM.     -Elevated liver enzymes: I have asked pharmacy to evaluate her medication list to review any potential causes of elevated LFT's. Stopped tylenol. Will defer pain medications to primary team. Check viral hepatitis panel and abdominal US. Repeat CMP in the AM as well as PT/INR. Pharmacy input is appreciated.      -Chronic diastolic CHF: Appears compensated at present. Cont home Lasix. Stop IVF's.      -COPD: Appears stable at present with no acute exacerbation     -DVT prophylaxis:  Eliquis per primary    Discussed with Dr. Fisher.       Jonathan Antoine,   02/21/18  3:54 PM

## 2018-02-21 NOTE — PROGRESS NOTES
Inpatient Progress Note  Lisa Calloway  Date: 02/21/18  MRN: [id number]      Subjective:  Lisa Calloway is a 78 y.o. female POD#1 Right ANUPAMA. Patient is up in bedside chair this morning eating breakfast. She complains of moderate pain. She states it was more severe over night. She denies paresthesias. She has no new complaints today.     Objective:    Vitals:    02/21/18 0025 02/21/18 0044 02/21/18 0416 02/21/18 0550   BP: 142/73  153/74 137/50   BP Location: Right arm  Right arm Left arm   Patient Position: Lying  Lying Lying   Pulse: 84 73 87 96   Resp: 18 18 18 18   Temp: 98.1 °F (36.7 °C)  98.3 °F (36.8 °C) 99.1 °F (37.3 °C)   TempSrc: Oral  Oral Oral   SpO2: 98% 98% 97%    Weight:       Height:         Examination of the right hip reveals clean and dry and incision. No surrounding erythema. Mild swelling right hip. No calf tenderness. Active dorsiflexion and plantarflexion. Neurovascular status is intact.     Labs:      Results from last 7 days  Lab Units 02/21/18  0126 02/20/18  1312 02/14/18  0946   WBC 10*3/mm3 9.95 11.99 8.00   HEMOGLOBIN g/dL 8.9* 10.5* 12.4   HEMATOCRIT % 28.0* 32.8* 38.5   MCV fL 92.7 89.9 89.7   MCHC g/dL 31.8* 32.0* 32.2*   PLATELETS 10*3/mm3 266 298 332           Results from last 7 days  Lab Units 02/21/18  0126 02/20/18  2214 02/20/18  1312 02/14/18  0946   SODIUM mmol/L 139  --  140 140   POTASSIUM mmol/L 3.9 3.7 3.4* 3.3*   MAGNESIUM mg/dL 1.7  --   --   --    CHLORIDE mmol/L 107  --  106 103   CO2 mmol/L 24.2*  --  26.1 22.3*   BUN mg/dL 19  --  21 20   CREATININE mg/dL 0.79  --  0.73 0.90   EGFR IF NONAFRICN AM mL/min/1.73 70  --  77 60*   CALCIUM mg/dL 8.3  --  8.4 9.4   GLUCOSE mg/dL 145*  --  169* 116*   ALBUMIN g/dL 3.50  --   --   --    BILIRUBIN mg/dL 0.3  --   --   --    ALK PHOS U/L 134*  --   --   --    AST (SGOT) U/L 385*  --   --   --    ALT (SGPT) U/L 508*  --   --   --    Estimated Creatinine Clearance: 45 mL/min (by C-G formula based on Cr of 0.79).  No results  found for: AMMONIA          No results found for: HGBA1C  No results found for: TSH, FREET4        Radiology:  Imaging Results (last 72 hours)     Procedure Component Value Units Date/Time    XR Hip With or Without Pelvis 1 View Right [[id number]] Collected:  02/20/18 1107     Updated:  02/20/18 1111    Narrative:       EXAMINATION: XR HIP W OR WO PELVIS 2-3 VIEW RIGHT-      CLINICAL INDICATION:Postoperative; M25.551-Pain in right hip;  M16.11-Unilateral primary osteoarthritis, right hip; Z01.818-Encounter  for other preprocedural examination        COMPARISON: None      TECHNIQUE: Single view right hip     FINDINGS:   Status post right hip arthroplasty. Anatomic alignment. No radiographic  evidence of hardware complication.           Impression:       Status post right hip arthroplasty without complicating feature  identified.     This report was finalized on 2/20/2018 11:07 AM by Dr. Christian Regan MD.               Assessment:      ICD-10-CM ICD-9-CM   1. Right hip pain M25.551 719.45   2. Primary osteoarthritis of right hip M16.11 715.15   3. Pre-op testing Z01.818 V72.84       Plan:  POD#1 Right ANUPAMA. Doing well. Pain controlled. Physical therapy will begin today per protocol with toe touch weight bearing status. Will continue to watch H&H. Continue Eliquis for DVT prophylaxis. SS will initiate discharge planning.       JANA Vora

## 2018-02-22 LAB
ALBUMIN SERPL-MCNC: 3.4 G/DL (ref 3.4–4.8)
ALBUMIN/GLOB SERPL: 1.5 G/DL (ref 1.5–2.5)
ALP SERPL-CCNC: 109 U/L (ref 35–104)
ALT SERPL W P-5'-P-CCNC: 294 U/L (ref 10–36)
ANION GAP SERPL CALCULATED.3IONS-SCNC: 7 MMOL/L (ref 3.6–11.2)
AST SERPL-CCNC: 113 U/L (ref 10–30)
BASOPHILS # BLD AUTO: 0.01 10*3/MM3 (ref 0–0.3)
BASOPHILS NFR BLD AUTO: 0.1 % (ref 0–2)
BILIRUB SERPL-MCNC: 0.2 MG/DL (ref 0.2–1.8)
BUN BLD-MCNC: 14 MG/DL (ref 7–21)
BUN/CREAT SERPL: 21.2 (ref 7–25)
CALCIUM SPEC-SCNC: 8 MG/DL (ref 7.7–10)
CHLORIDE SERPL-SCNC: 106 MMOL/L (ref 99–112)
CO2 SERPL-SCNC: 26 MMOL/L (ref 24.3–31.9)
CREAT BLD-MCNC: 0.66 MG/DL (ref 0.43–1.29)
DEPRECATED RDW RBC AUTO: 49.3 FL (ref 37–54)
EOSINOPHIL # BLD AUTO: 0.1 10*3/MM3 (ref 0–0.7)
EOSINOPHIL NFR BLD AUTO: 1.1 % (ref 0–7)
ERYTHROCYTE [DISTWIDTH] IN BLOOD BY AUTOMATED COUNT: 15.1 % (ref 11.5–14.5)
GFR SERPL CREATININE-BSD FRML MDRD: 86 ML/MIN/1.73
GLOBULIN UR ELPH-MCNC: 2.3 GM/DL
GLUCOSE BLD-MCNC: 155 MG/DL (ref 70–110)
HCT VFR BLD AUTO: 27.3 % (ref 37–47)
HGB BLD-MCNC: 8.3 G/DL (ref 12–16)
IMM GRANULOCYTES # BLD: 0.04 10*3/MM3 (ref 0–0.03)
IMM GRANULOCYTES NFR BLD: 0.4 % (ref 0–0.5)
LYMPHOCYTES # BLD AUTO: 1.52 10*3/MM3 (ref 1–3)
LYMPHOCYTES NFR BLD AUTO: 16.9 % (ref 16–46)
MAGNESIUM SERPL-MCNC: 2.2 MG/DL (ref 1.7–2.6)
MCH RBC QN AUTO: 28.7 PG (ref 27–33)
MCHC RBC AUTO-ENTMCNC: 30.4 G/DL (ref 33–37)
MCV RBC AUTO: 94.5 FL (ref 80–94)
MONOCYTES # BLD AUTO: 1.46 10*3/MM3 (ref 0.1–0.9)
MONOCYTES NFR BLD AUTO: 16.3 % (ref 0–12)
NEUTROPHILS # BLD AUTO: 5.84 10*3/MM3 (ref 1.4–6.5)
NEUTROPHILS NFR BLD AUTO: 65.2 % (ref 40–75)
OSMOLALITY SERPL CALC.SUM OF ELEC: 281.2 MOSM/KG (ref 273–305)
PLATELET # BLD AUTO: 239 10*3/MM3 (ref 130–400)
PMV BLD AUTO: 10.8 FL (ref 6–10)
POTASSIUM BLD-SCNC: 3.9 MMOL/L (ref 3.5–5.3)
PROT SERPL-MCNC: 5.7 G/DL (ref 6–8)
RBC # BLD AUTO: 2.89 10*6/MM3 (ref 4.2–5.4)
SODIUM BLD-SCNC: 139 MMOL/L (ref 135–153)
WBC NRBC COR # BLD: 8.97 10*3/MM3 (ref 4.5–12.5)

## 2018-02-22 PROCEDURE — 83735 ASSAY OF MAGNESIUM: CPT | Performed by: ORTHOPAEDIC SURGERY

## 2018-02-22 PROCEDURE — 97167 OT EVAL HIGH COMPLEX 60 MIN: CPT

## 2018-02-22 PROCEDURE — 85025 COMPLETE CBC W/AUTO DIFF WBC: CPT | Performed by: INTERNAL MEDICINE

## 2018-02-22 PROCEDURE — 97116 GAIT TRAINING THERAPY: CPT

## 2018-02-22 PROCEDURE — 80053 COMPREHEN METABOLIC PANEL: CPT | Performed by: INTERNAL MEDICINE

## 2018-02-22 PROCEDURE — G8988 SELF CARE GOAL STATUS: HCPCS

## 2018-02-22 PROCEDURE — G8987 SELF CARE CURRENT STATUS: HCPCS

## 2018-02-22 PROCEDURE — 94799 UNLISTED PULMONARY SVC/PX: CPT

## 2018-02-22 PROCEDURE — 99232 SBSQ HOSP IP/OBS MODERATE 35: CPT | Performed by: INTERNAL MEDICINE

## 2018-02-22 PROCEDURE — 97110 THERAPEUTIC EXERCISES: CPT

## 2018-02-22 PROCEDURE — 99024 POSTOP FOLLOW-UP VISIT: CPT | Performed by: PHYSICIAN ASSISTANT

## 2018-02-22 PROCEDURE — 25010000002 HYDROMORPHONE PER 4 MG

## 2018-02-22 RX ORDER — HYDROMORPHONE HCL 110MG/55ML
PATIENT CONTROLLED ANALGESIA SYRINGE INTRAVENOUS
Status: COMPLETED
Start: 2018-02-22 | End: 2018-02-22

## 2018-02-22 RX ADMIN — DOCUSATE SODIUM 100 MG: 100 CAPSULE, LIQUID FILLED ORAL at 22:16

## 2018-02-22 RX ADMIN — GABAPENTIN 600 MG: 300 CAPSULE ORAL at 05:26

## 2018-02-22 RX ADMIN — GABAPENTIN 600 MG: 300 CAPSULE ORAL at 22:16

## 2018-02-22 RX ADMIN — FLUTICASONE PROPIONATE 1 SPRAY: 50 SPRAY, METERED NASAL at 22:17

## 2018-02-22 RX ADMIN — CELECOXIB 200 MG: 200 CAPSULE ORAL at 22:17

## 2018-02-22 RX ADMIN — HYDROCODONE BITARTRATE AND ACETAMINOPHEN 2 TABLET: 7.5; 325 TABLET ORAL at 22:37

## 2018-02-22 RX ADMIN — HYDROMORPHONE HYDROCHLORIDE 0.5 MG: 2 INJECTION INTRAMUSCULAR; INTRAVENOUS; SUBCUTANEOUS at 01:42

## 2018-02-22 RX ADMIN — BACLOFEN 10 MG: 10 TABLET ORAL at 08:39

## 2018-02-22 RX ADMIN — BACLOFEN 10 MG: 10 TABLET ORAL at 16:30

## 2018-02-22 RX ADMIN — NIFEDIPINE 30 MG: 30 TABLET, EXTENDED RELEASE ORAL at 08:39

## 2018-02-22 RX ADMIN — CELECOXIB 200 MG: 200 CAPSULE ORAL at 08:39

## 2018-02-22 RX ADMIN — BACLOFEN 10 MG: 10 TABLET ORAL at 22:17

## 2018-02-22 RX ADMIN — HYDROCODONE BITARTRATE AND ACETAMINOPHEN 2 TABLET: 7.5; 325 TABLET ORAL at 05:27

## 2018-02-22 RX ADMIN — BUSPIRONE HYDROCHLORIDE 10 MG: 10 TABLET ORAL at 14:08

## 2018-02-22 RX ADMIN — BUSPIRONE HYDROCHLORIDE 10 MG: 10 TABLET ORAL at 08:39

## 2018-02-22 RX ADMIN — ASPIRIN 81 MG: 81 TABLET ORAL at 08:38

## 2018-02-22 RX ADMIN — BUSPIRONE HYDROCHLORIDE 10 MG: 10 TABLET ORAL at 17:56

## 2018-02-22 RX ADMIN — GABAPENTIN 600 MG: 300 CAPSULE ORAL at 14:08

## 2018-02-22 RX ADMIN — Medication 1 TABLET: at 08:39

## 2018-02-22 RX ADMIN — BUSPIRONE HYDROCHLORIDE 10 MG: 10 TABLET ORAL at 22:17

## 2018-02-22 RX ADMIN — CETIRIZINE HYDROCHLORIDE 5 MG: 10 TABLET ORAL at 08:39

## 2018-02-22 RX ADMIN — PANTOPRAZOLE SODIUM 40 MG: 40 TABLET, DELAYED RELEASE ORAL at 05:27

## 2018-02-22 RX ADMIN — AMITRIPTYLINE HYDROCHLORIDE 20 MG: 10 TABLET, FILM COATED ORAL at 22:16

## 2018-02-22 RX ADMIN — APIXABAN 2.5 MG: 2.5 TABLET, FILM COATED ORAL at 08:39

## 2018-02-22 RX ADMIN — Medication 1 TABLET: at 08:38

## 2018-02-22 RX ADMIN — FUROSEMIDE 40 MG: 40 TABLET ORAL at 08:39

## 2018-02-22 RX ADMIN — APIXABAN 2.5 MG: 2.5 TABLET, FILM COATED ORAL at 22:17

## 2018-02-22 RX ADMIN — HYDROCODONE BITARTRATE AND ACETAMINOPHEN 2 TABLET: 7.5; 325 TABLET ORAL at 14:08

## 2018-02-22 NOTE — PROGRESS NOTES
Discharge Planning Assessment   Miky     Patient Name: Lisa Calloway  MRN: [id number]  Today's Date: 2/22/2018    Admit Date: 2/20/2018       Discharge Plan       02/22/18 1149    Case Management/Social Work Plan    Plan SS contacted [facility] per Lovely who states plans to admit pt on Friday, 2-23-18. [facility] request pt's pain medication prescription be faxed in the morning. Nurse made aware. SS to follow.         Discharge Placement     Facility/Agency Request Status Selected? Address Phone Number Fax Number    [facility] Pending - No Request Sent     [id number], [place] [address] [phone]        Expected Discharge Date and Time     Expected Discharge Date Expected Discharge Time    Feb 23, 2018                Miracle Clark

## 2018-02-22 NOTE — THERAPY EVALUATION
Acute Care - Occupational Therapy Initial Evaluation   [facility]     Patient Name: Lisa Calloway  : 1940  MRN: [id number]  Today's Date: 2018  Onset of Illness/Injury or Date of Surgery Date: 18     Referring Physician: Phillip    Admit Date: 2018       ICD-10-CM ICD-9-CM   1. Right hip pain M25.551 719.45   2. Primary osteoarthritis of right hip M16.11 715.15   3. Pre-op testing Z01.818 V72.84     Patient Active Problem List   Diagnosis   • Essential hypertension   • Osteoarthritis (arthritis due to wear and tear of joints)   • Raynaud disease   • Anxiety   • COPD (chronic obstructive pulmonary disease)   • GERD (gastroesophageal reflux disease)   • Chronic diastolic heart failure   • Palpitations   • Polypharmacy   • Spondylolisthesis, lumbar region   • Spinal stenosis, lumbar region, with neurogenic claudication   • Spondylolisthesis, acquired   • Acquired spondylolisthesis   • Primary osteoarthritis of right hip   • Localized edema   • Right hip pain     Past Medical History:   Diagnosis Date   • Anxiety    • Arthritis    • Calcinosis-Raynaud's sclerodactyly-telangiectasia syndrome    • CHF (congestive heart failure)    • COPD (chronic obstructive pulmonary disease)    • Diastolic heart failure 2016    Echocardiogram, 08/10/2011: LVH, LVEF 70% and no significant valvular abnormality.  Pharmacologic MPS, 2011: normal, LVEF 80%. Recurrent chest pain/CHF presentations to the [place], [place] area. Cardiac catheterization, 2012: Normal coronary arteries, LVEF 65%, LVEDP 32 mmHg. Renal angiography, 2012: normal bilateral renal arteries.  Echocardiogram for worsening dyspnea,  GERD (gastroesophageal reflux disease)    • Heart murmur    • Hiatal hernia    • High cholesterol    • History of recurrent UTIs    • History of staph infection     after hip replacement   • History of stomach ulcers    • History of transfusion    • Hypertension    • Low back pain    •  Lumbar herniated disc     with right-sided sciatica:Status post lumbar fusion, 01/17/2013.   • Migraine    • Panic attacks    • SOB (shortness of breath)    • Wears dentures     upper denture and partial   • Wears eyeglasses      Past Surgical History:   Procedure Laterality Date   • ABDOMINAL SURGERY     • APPENDECTOMY  1975   • BACK SURGERY      lumbar fusion   • BREAST SURGERY      Biopsy   • CARDIAC SURGERY      cardiac cath   • CHOLECYSTECTOMY  1996   • EYE SURGERY      cataract surgery   • HIP SURGERY     • HYSTERECTOMY  1999   • JOINT REPLACEMENT      hip left   • LUMBAR LAMINECTOMY WITH FUSION N/A 3/17/2017    Procedure: LUMBAR FUSION DECOMPRESSON WITH PEDICLE SCREWS L3-4 ;  Surgeon: Sen Skinner MD;  Location: [facility] OR;  Service:    • OTHER SURGICAL HISTORY      arthrodesis lumbar   • OTHER SURGICAL HISTORY  1982    neuroplasty decompression median nerve at carpal tunnel   • TONSILLECTOMY  1963   • TOTAL HIP ARTHROPLASTY Right 2/20/2018    Procedure: TOTAL HIP ARTHROPLASTY;  Surgeon: Oswaldo Fisher MD;  Location: [facility] OR;  Service:    • TUBAL ABDOMINAL LIGATION  1975          OT ASSESSMENT FLOWSHEET (last 72 hours)      OT Evaluation       02/22/18 1341 02/22/18 0800 02/21/18 2202 02/21/18 1444 02/21/18 0800    Rehab Evaluation    Document Type evaluation  -KR   therapy note (daily note)   AM treatment only  -CT     Subjective Information agree to therapy  -KR   agree to therapy;complains of;pain  -CT     Patient Effort, Rehab Treatment good  -KR   fair  -CT     General Information    Precautions/Limitations    fall precautions;oxygen therapy device and L/min;hip precautions- right  -CT     Living Environment    Lives With alone  -KR        Clinical Impression    Criteria for Skilled Therapeutic Interventions Met yes;treatment indicated  -KR        Rehab Potential good, to achieve stated therapy goals  -KR        Therapy Frequency 3-5 times/wk  -KR        Pain Assessment    Pain Assessment     0-10  -CT     Pain Score    8  -CT     Pain Type    Surgical pain  -CT     Pain Location    Hip  -CT     Pain Orientation    Right  -CT     Pain Intervention(s)    Repositioned;Rest  -CT     Vision Assessment/Intervention    Visual Impairment WFL  -KR        Cognitive Assessment/Intervention    Current Cognitive/Communication Assessment functional  -KR   functional  -CT     Orientation Status oriented x 4  -KR   oriented x 4  -CT     Personal Safety Interventions    fall prevention program maintained;gait belt;muscle strengthening facilitated;nonskid shoes/slippers when out of bed  -CT     ROM (Range of Motion)    General ROM Detail BUE WFL  -KR        MMT (Manual Muscle Testing)    General MMT Assessment Detail BUE 3/5  -KR        Muscle Tone Assessment    Muscle Tone Assessment  RLE  -CB RLE  -KM  RLE  -CB    RLE Muscle Tone Assessment  moderately decreased tone  -CB moderately decreased tone  -KM  moderately decreased tone  -CB    Mobility Assessment/Training    Right Lower Extremity Weight-Bearing    partial weight-bearing  -CT     Bed Mobility, Assessment/Treatment    Bed Mobility, Assistive Device    bed rails  -CT     Bed Mobility, Scoot/Bridge, [place]    moderate assist (50% patient effort);2 person assist required  -CT     Bed Mob, Supine to Sit, [place]    moderate assist (50% patient effort);2 person assist required  -CT     Bed Mobility, Safety Issues    decreased use of legs for bridging/pushing  -CT     Bed Mobility, Impairments    strength decreased;ROM decreased  -CT     Transfer Assessment/Treatment    Transfers, Bed-Chair [place]    minimum assist (75% patient effort);moderate assist (50% patient effort);2 person assist required;verbal cues required;nonverbal cues required (demo/gesture)  -CT     Transfers, Chair-Bed [place]    minimum assist (75% patient effort);moderate assist (50% patient effort);2 person assist required;verbal cues required;nonverbal cues required  (demo/gesture)  -CT     Transfers, Bed-Chair-Bed, Assist Device    rolling walker  -CT     Transfers, Sit-Stand [place]    minimum assist (75% patient effort);moderate assist (50% patient effort);2 person assist required;verbal cues required;nonverbal cues required (demo/gesture)  -CT     Transfers, Stand-Sit [place]    minimum assist (75% patient effort);moderate assist (50% patient effort);2 person assist required;verbal cues required;nonverbal cues required (demo/gesture)  -CT     Transfers, Sit-Stand-Sit, Assist Device    rolling walker  -CT     Transfer, Safety Issues    step length decreased;balance decreased during turns  -CT     Upper Body Bathing Assessment/Training    UB Bathing Assess/Train, [place] Level minimum assist (75% patient effort)  -KR        Lower Body Bathing Assessment/Training    LB Bathing Assess/Train, [place] Level maximum assist (25% patient effort)  -KR        Upper Body Dressing Assessment/Training    UB Dressing Assess/Train, [place] minimum assist (75% patient effort)  -KR        Lower Body Dressing Assessment/Training    LB Dressing Assess/Train, [place] maximum assist (25% patient effort)  -KR        Toileting Assessment/Training    Toileting Assess/Train, [place] Level maximum assist (25% patient effort)  -KR        Grooming Assessment/Training    Grooming Assess/Train, [place] Level minimum assist (75% patient effort)  -KR        Therapy Exercises    Bilateral Lower Extremities    AROM:;10 reps;sitting  -CT     General Therapy Interventions    Planned Therapy Interventions ADL retraining;strengthening  -KR        Positioning and Restraints    Pre-Treatment Position    in bed  -CT     Post Treatment Position    chair  -CT     In Chair    sitting;call light within reach;encouraged to call for assist;with family/caregiver;notified ns  -CT       02/20/18 2151 02/20/18 1550 02/20/18 1507 02/20/18 1140 02/20/18 1100    Rehab Evaluation    Document Type    evaluation;therapy note (daily note)  -CT      Subjective Information   agree to therapy;complains of;pain  -CT      Patient Effort, Rehab Treatment   good  -CT      Symptoms Noted During/After Treatment   increased pain  -CT      Symptoms Noted Comment   Pt tolerated evaluation and treatment session well with rest breaks provided as needed. Pt able to perform transfer to [facility] and to bedside chair today with little assist.   -CT      General Information    Patient Profile Review   yes  -CT      Onset of Illness/Injury or Date of Surgery Date   02/20/18  -CT      Referring Physician   Phillip  -CT      Pertinent History Of Current Problem   ANUPAMA  -CT      Precautions/Limitations   fall precautions;oxygen therapy device and L/min;hip precautions- right  -CT      Prior Level of Function   independent:;all household mobility;community mobility  -CT      Equipment Currently Used at Home  walker, rolling;cane, straight   [facility]   -WP walker, rolling;cane, straight  -CT      Plans/Goals Discussed With   patient;agreed upon  -CT      Risks Reviewed   patient:;LOB;nausea/vomiting;dizziness;increased discomfort;change in vital signs;increased drainage;lines disloged  -CT      Benefits Reviewed   patient:;improve function;increase independence;increase strength;decrease pain;increase balance;decrease risk of DVT;improve skin integrity;increase knowledge  -CT      Barriers to Rehab   physical barrier  -CT      Living Environment    Lives With  alone  -WP alone  -CT  alone  -CB    Living Arrangements   house  -CT  house  -CB    Home Accessibility   stairs to enter home  -CT  stairs to enter home  -CB    Number of Stairs to Enter Home   3  -CT  3  -CB    Stair Railings at Home   outside, present on left side  -CT  outside, present on left side  -CB    Type of Financial/Environmental Concern     none  -CB    Transportation Available  ambulance  -WP   car;family or friend will provide  -CB    Functional Level Prior     Ambulation     1-->assistive equipment  -CB    Transferring     0-->independent  -CB    Toileting     0-->independent  -CB    Bathing     0-->independent  -CB    Dressing     0-->independent  -CB    Eating     0-->independent  -CB    Communication     0-->understands/communicates without difficulty  -CB    Swallowing     0-->swallows foods/liquids without difficulty  -CB    Pain Assessment    Pain Assessment   0-10  -CT      Pain Score   7  -CT      Pain Type   Surgical pain  -CT      Pain Location   Hip  -CT      Pain Orientation   Right  -CT      Pain Intervention(s)   Repositioned;Rest  -CT      Cognitive Assessment/Intervention    Current Cognitive/Communication Assessment   functional  -CT      Orientation Status   oriented x 4  -CT      Follows Commands/Answers Questions   able to follow multi-step instructions;100% of the time  -CT      Personal Safety   decreased awareness, need for assist;decreased awareness, need for safety  -CT      Personal Safety Interventions   fall prevention program maintained;gait belt;muscle strengthening facilitated;nonskid shoes/slippers when out of bed  -CT      ROM (Range of Motion)    General ROM Detail   LLE WFL; RLE knee and ankle WFL  -CT      MMT (Manual Muscle Testing)    General MMT Assessment Detail   LLE 4/5; RLE deferred  -CT      Muscle Tone Assessment    Muscle Tone Assessment RLE  -KM   RLE  -CB     RLE Muscle Tone Assessment moderately decreased tone  -KM   moderately decreased tone  -CB     Mobility Assessment/Training    Extremity Weight-Bearing Status   right lower extremity  -CT      Right Lower Extremity Weight-Bearing   partial weight-bearing  -CT      Bed Mobility, Assessment/Treatment    Bed Mobility, Assistive Device   bed rails  -CT      Bed Mobility, Scoot/Bridge, [place]   moderate assist (50% patient effort);2 person assist required  -CT      Bed Mob, Supine to Sit, [place]   moderate assist (50% patient effort);2 person assist required  -CT       Bed Mobility, Safety Issues   decreased use of legs for bridging/pushing  -CT      Bed Mobility, Impairments   strength decreased;ROM decreased  -CT      Transfer Assessment/Treatment    Transfers, Bed-Chair [place]   minimum assist (75% patient effort);moderate assist (50% patient effort);2 person assist required;verbal cues required;nonverbal cues required (demo/gesture)  -CT      Transfers, Chair-Bed [place]   minimum assist (75% patient effort);moderate assist (50% patient effort);2 person assist required;verbal cues required;nonverbal cues required (demo/gesture)  -CT      Transfers, Bed-Chair-Bed, Assist Device   rolling walker  -CT      Transfers, Sit-Stand [place]   minimum assist (75% patient effort);moderate assist (50% patient effort);2 person assist required;verbal cues required;nonverbal cues required (demo/gesture)  -CT      Transfers, Stand-Sit [place]   minimum assist (75% patient effort);moderate assist (50% patient effort);2 person assist required;verbal cues required;nonverbal cues required (demo/gesture)  -CT      Transfers, Sit-Stand-Sit, Assist Device   rolling walker  -CT      Transfer, Maintain Weight Bearing Status   cues to maintain weight bearing status  -CT      Transfer, Safety Issues   step length decreased;balance decreased during turns  -CT      Positioning and Restraints    Pre-Treatment Position   in bed  -CT      Post Treatment Position   chair  -CT      In Chair   sitting;call light within reach;encouraged to call for assist;with family/caregiver;notified ns  -CT        02/20/18 0648                General Information    Equipment Currently Used at Home walker, rolling;cane, straight  -LM          User Key  (r) = Recorded By, (t) = Taken By, (c) = Cosigned By    Initials Name Effective Dates    CB Trista Phipps RN 06/16/16 -     WP Miracle Clark 02/10/16 -     KR Christian Bill, OT 03/14/16 -     CT Scarlet Samano, PT 03/14/16 -     LM Edgar MENJIVAR  JAGDEEP Muñoz 06/16/16 -     MICHELL Yarbrough RN 08/19/16 -                  OT Recommendation and Plan  Planned Therapy Interventions: ADL retraining, strengthening  Therapy Frequency: 3-5 times/wk                 Outcome Measures       02/22/18 1343 02/22/18 1300 02/21/18 1400    How much help from another person do you currently need...    Turning from your back to your side while in flat bed without using bedrails?   3  -CT    Moving from lying on back to sitting on the side of a flat bed without bedrails?   2  -CT    Moving to and from a bed to a chair (including a wheelchair)?   3  -CT    Standing up from a chair using your arms (e.g., wheelchair, bedside chair)?   3  -CT    Climbing 3-5 steps with a railing?   2  -CT    To walk in hospital room?   2  -CT    AM-PAC 6 Clicks Score   15  -CT    How much help from another is currently needed...    Putting on and taking off regular lower body clothing? 2  -KR      Bathing (including washing, rinsing, and drying) 2  -KR      Toileting (which includes using toilet bed pan or urinal) 2  -KR      Putting on and taking off regular upper body clothing 3  -KR      Taking care of personal grooming (such as brushing teeth) 3  -KR      Eating meals 3  -KR      Score 15  -KR      Functional Assessment    Outcome Measure Options  AM-PAC 6 Clicks Daily Activity (OT)  -KR AM-PAC 6 Clicks Basic Mobility (PT)  -CT      02/20/18 1500          How much help from another person do you currently need...    Turning from your back to your side while in flat bed without using bedrails? 3  -CT      Moving from lying on back to sitting on the side of a flat bed without bedrails? 2  -CT      Moving to and from a bed to a chair (including a wheelchair)? 3  -CT      Standing up from a chair using your arms (e.g., wheelchair, bedside chair)? 3  -CT      Climbing 3-5 steps with a railing? 2  -CT      To walk in hospital room? 2  -CT      AM-PAC 6 Clicks Score 15  -CT      Functional  Assessment    Outcome Measure Options AM-PAC 6 Clicks Basic Mobility (PT)  -CT        User Key  (r) = Recorded By, (t) = Taken By, (c) = Cosigned By    Initials Name Provider Type    KR Christian Bill OT Occupational Therapist    JEFFERSON Samano PT Physical Therapist          Time Calculation:        Therapy Charges for Today     Code Description Service Date Service Provider Modifiers Qty    [id number]  OT SELFCARE CURRENT 2/22/2018 MEAGHAN Castelan, CL 1    [id number]  OT SELFCARE PROJECTED 2/22/2018 MEAGHAN Castelan CJ 1    [id number]  OT EVAL HIGH COMPLEXITY 2 2/22/2018 Christian Bill OT GO 1          OT G-codes  OT Professional Judgement Used?: Yes  Functional Limitation: Self care  Self Care Current Status (): At least 60 percent but less than 80 percent impaired, limited or restricted  Self Care Goal Status (): At least 20 percent but less than 40 percent impaired, limited or restricted    Christian Bill OT  2/22/2018

## 2018-02-22 NOTE — PLAN OF CARE
Problem: Inpatient Occupational Therapy  Goal: Strength Goal LTG- OT   02/22/18 1347   Strength OT LTG   Strength Goal OT LTG, Date Established 02/22/18   Strength Goal OT LTG, Time to Achieve by discharge   Strength Goal OT LTG, Measure to Achieve BUE increase x 1 to enhance self care/mobility skills     Goal: ADL Goal LTG- OT   02/22/18 1347   ADL OT LTG   ADL OT LTG, Date Established 02/22/18   ADL OT LTG, Time to Achieve by discharge   ADL OT LTG, Activity Type ADL skills   ADL OT LTG, [place] Level min assist;assistive device

## 2018-02-22 NOTE — THERAPY TREATMENT NOTE
Acute Care - Physical Therapy Treatment Note   [place]     Patient Name: Lisa Calloway  : 1940  MRN: [id number]  Today's Date: 2018  Onset of Illness/Injury or Date of Surgery Date: 18  Date of Referral to PT: 18  Referring Physician: Phillip    Admit Date: 2018    Visit Dx:    ICD-10-CM ICD-9-CM   1. Right hip pain M25.551 719.45   2. Primary osteoarthritis of right hip M16.11 715.15   3. Pre-op testing Z01.818 V72.84     Patient Active Problem List   Diagnosis   • Essential hypertension   • Osteoarthritis (arthritis due to wear and tear of joints)   • Raynaud disease   • Anxiety   • COPD (chronic obstructive pulmonary disease)   • GERD (gastroesophageal reflux disease)   • Chronic diastolic heart failure   • Palpitations   • Polypharmacy   • Spondylolisthesis, lumbar region   • Spinal stenosis, lumbar region, with neurogenic claudication   • Spondylolisthesis, acquired   • Acquired spondylolisthesis   • Primary osteoarthritis of right hip   • Localized edema   • Right hip pain               Adult Rehabilitation Note       18 1405 18 1444       Rehab Assessment/Intervention    Discipline physical therapist  -CT physical therapist  -CT     Document Type therapy note (daily note)   BID treatment  -CT therapy note (daily note)   AM treatment only  -CT     Subjective Information agree to therapy  -CT agree to therapy;complains of;pain  -CT     Patient Effort, Rehab Treatment good  -CT fair  -CT     Precautions/Limitations fall precautions;oxygen therapy device and L/min;hip precautions- right  -CT fall precautions;oxygen therapy device and L/min;hip precautions- right  -CT     Patient Response to Treatment Pt tolerated both AM and PM treatment sessions well with rest breaks provided as needed. Pt able to increase ambulation distance today.   -CT Pt tolerated AM treatment session well with rest break provided as needed. Pt declined PM treatment session d/t increase pain.    -CT     Recorded by [CT] Scarlet Samano, PT [CT] Scarlet Samano, PT     Pain Assessment    Pain Assessment 0-10  -CT 0-10  -CT     Pain Score 4  -CT 8  -CT     Pain Type Surgical pain  -CT Surgical pain  -CT     Pain Location Hip  -CT Hip  -CT     Pain Orientation Right  -CT Right  -CT     Pain Intervention(s) Repositioned;Rest  -CT Repositioned;Rest  -CT     Recorded by [CT] Scarlet Samano, PT [CT] Scarlet Samano, PT     Cognitive Assessment/Intervention    Current Cognitive/Communication Assessment functional  -CT functional  -CT     Orientation Status oriented x 4  -CT oriented x 4  -CT     Personal Safety Interventions fall prevention program maintained;gait belt;muscle strengthening facilitated;nonskid shoes/slippers when out of bed  -CT fall prevention program maintained;gait belt;muscle strengthening facilitated;nonskid shoes/slippers when out of bed  -CT     Recorded by [CT] Scarlet Samano, PT [CT] Scarlet Samano, PT     Mobility Assessment/Training    Right Lower Extremity Weight-Bearing partial weight-bearing  -CT partial weight-bearing  -CT     Recorded by [CT] Scarlet Samano, PT [CT] Scarlet Samano, PT     Bed Mobility, Assessment/Treatment    Bed Mobility, Assistive Device bed rails  -CT bed rails  -CT     Bed Mobility, Scoot/Bridge, [place] moderate assist (50% patient effort);2 person assist required  -CT moderate assist (50% patient effort);2 person assist required  -CT     Bed Mob, Supine to Sit, [place] moderate assist (50% patient effort);2 person assist required  -CT moderate assist (50% patient effort);2 person assist required  -CT     Bed Mob, Sit to Supine, [place] moderate assist (50% patient effort);2 person assist required;verbal cues required;nonverbal cues required (demo/gesture)  -CT      Bed Mobility, Safety Issues decreased use of legs for bridging/pushing  -CT decreased use of legs for bridging/pushing  -CT     Bed Mobility, Impairments strength decreased;ROM decreased   -CT strength decreased;ROM decreased  -CT     Recorded by [CT] Scarlet Samano PT [CT] Scarlet Samano PT     Transfer Assessment/Treatment    Transfers, Bed-Chair [place] minimum assist (75% patient effort);moderate assist (50% patient effort);2 person assist required;verbal cues required;nonverbal cues required (demo/gesture)  -CT minimum assist (75% patient effort);moderate assist (50% patient effort);2 person assist required;verbal cues required;nonverbal cues required (demo/gesture)  -CT     Transfers, Chair-Bed [place] minimum assist (75% patient effort);moderate assist (50% patient effort);2 person assist required;verbal cues required;nonverbal cues required (demo/gesture)  -CT minimum assist (75% patient effort);moderate assist (50% patient effort);2 person assist required;verbal cues required;nonverbal cues required (demo/gesture)  -CT     Transfers, Bed-Chair-Bed, Assist Device rolling walker  -CT rolling walker  -CT     Transfers, Sit-Stand [place] minimum assist (75% patient effort);moderate assist (50% patient effort);2 person assist required;verbal cues required;nonverbal cues required (demo/gesture)  -CT minimum assist (75% patient effort);moderate assist (50% patient effort);2 person assist required;verbal cues required;nonverbal cues required (demo/gesture)  -CT     Transfers, Stand-Sit [place] minimum assist (75% patient effort);moderate assist (50% patient effort);2 person assist required;verbal cues required;nonverbal cues required (demo/gesture)  -CT minimum assist (75% patient effort);moderate assist (50% patient effort);2 person assist required;verbal cues required;nonverbal cues required (demo/gesture)  -CT     Transfers, Sit-Stand-Sit, Assist Device rolling walker  -CT rolling walker  -CT     Transfer, Safety Issues step length decreased;balance decreased during turns  -CT step length decreased;balance decreased during turns  -CT     Recorded by [CT] Scarlet Samano PT  [CT] Scarlet Samano PT     Gait Assessment/Treatment    Gait, [place] Level minimum assist (75% patient effort);moderate assist (50% patient effort);2 person assist required;verbal cues required;nonverbal cues required (demo/gesture)  -CT minimum assist (75% patient effort);moderate assist (50% patient effort);2 person assist required;verbal cues required;nonverbal cues required (demo/gesture)  -CT     Gait, Assistive Device rolling walker  -CT rolling walker  -CT     Gait, Distance (Feet) 60  -CT 10  -CT     Gait, Gait Pattern Analysis swing-to gait  -CT      Recorded by [CT] Scarlet Samano PT [CT] Scarlet Samano PT     Therapy Exercises    Bilateral Lower Extremities AROM:;10 reps;sitting;supine  -CT AROM:;10 reps;sitting  -CT     Recorded by [CT] Scarlet Samano, PT [CT] Scarlet Samano PT     Positioning and Restraints    Pre-Treatment Position  in bed  -CT     Post Treatment Position bed  -CT chair  -CT     In Bed supine;call light within reach;encouraged to call for assist;exit alarm on;with family/caregiver;notified nsg  -CT      In Chair sitting;call light within reach;encouraged to call for assist;with family/caregiver;notified nsg   in AM  -CT sitting;call light within reach;encouraged to call for assist;with family/caregiver;notified nsg  -CT     Recorded by [CT] Scarlet Samano, PT [CT] Scarlet Samano, PT       User Key  (r) = Recorded By, (t) = Taken By, (c) = Cosigned By    Initials Name Effective Dates    CT Scarlet Samano PT 03/14/16 -                 IP PT Goals       02/20/18 1521          Transfer Training PT LTG    Transfer Training PT LTG, Date Established 02/20/18  -CT      Transfer Training PT LTG, Time to Achieve by discharge  -CT      Transfer Training PT LTG, Activity Type bed to chair /chair to bed;sit to stand/stand to sit  -CT      Transfer Training PT LTG, [place] contact guard assist;minimum assist (75% patient effort)  -CT      Transfer Training PT LTG, Assist Device  other (see comments)   with appropriate AD  -CT      Gait Training PT LTG    Gait Training Goal PT LTG, Date Established 02/20/18  -CT      Gait Training Goal PT LTG, Time to Achieve by discharge  -CT      Gait Training Goal PT LTG, [place] Level minimum assist (75% patient effort);contact guard assist  -CT      Gait Training Goal PT LTG, Assist Device other (see comments)   with appropriate AD  -CT      Gait Training Goal PT LTG, Distance to Achieve 40  -CT        User Key  (r) = Recorded By, (t) = Taken By, (c) = Cosigned By    Initials Name Provider Type    CT Scarlet Samano PT Physical Therapist          Physical Therapy Education     Title: PT OT SLP Therapies (Active)     Topic: Physical Therapy (Done)     Point: Mobility training (Done)    Learning Progress Summary    Learner Readiness Method Response Comment Documented by Status   Patient Acceptance E VU  CT 02/22/18 1410 Done    Acceptance E VU  CT 02/21/18 1449 Done    Acceptance E VU  CT 02/20/18 1523 Done               Point: Home exercise program (Done)    Learning Progress Summary    Learner Readiness Method Response Comment Documented by Status   Patient Acceptance E VU  CT 02/22/18 1410 Done    Acceptance E VU  CT 02/21/18 1449 Done    Acceptance E VU  CT 02/20/18 1523 Done               Point: Body mechanics (Done)    Learning Progress Summary    Learner Readiness Method Response Comment Documented by Status   Patient Acceptance E VU  CT 02/22/18 1410 Done    Acceptance E VU  CT 02/21/18 1449 Done    Acceptance E VU  CT 02/20/18 1523 Done               Point: Precautions (Done)    Learning Progress Summary    Learner Readiness Method Response Comment Documented by Status   Patient Acceptance E VU  CT 02/22/18 1410 Done    Acceptance E VU  CT 02/21/18 1449 Done    Acceptance E VU  CT 02/20/18 1523 Done                      User Key     Initials Effective Dates Name Provider Type Discipline    CT 03/14/16 -  Scarlet Samano PT Physical Therapist  PT                    PT Recommendation and Plan  Anticipated Equipment Needs At Discharge:  (to be determined)  Anticipated Discharge Disposition: skilled nursing facility (per pt request)  Planned Therapy Interventions: balance training, bed mobility training, gait training, home exercise program, manual therapy techniques, motor coordination training, neuromuscular re-education, patient/family education, postural re-education, ROM (Range of Motion), strengthening, transfer training  PT Frequency: 2 times/day, 5 times/wk, per priority policy             Outcome Measures       02/22/18 1400 02/22/18 1343 02/22/18 1300    How much help from another person do you currently need...    Turning from your back to your side while in flat bed without using bedrails? 3  -CT      Moving from lying on back to sitting on the side of a flat bed without bedrails? 2  -CT      Moving to and from a bed to a chair (including a wheelchair)? 3  -CT      Standing up from a chair using your arms (e.g., wheelchair, bedside chair)? 3  -CT      Climbing 3-5 steps with a railing? 2  -CT      To walk in hospital room? 2  -CT      AM-PAC 6 Clicks Score 15  -CT      How much help from another is currently needed...    Putting on and taking off regular lower body clothing?  2  -KR     Bathing (including washing, rinsing, and drying)  2  -KR     Toileting (which includes using toilet bed pan or urinal)  2  -KR     Putting on and taking off regular upper body clothing  3  -KR     Taking care of personal grooming (such as brushing teeth)  3  -KR     Eating meals  3  -KR     Score  15  -KR     Functional Assessment    Outcome Measure Options AM-PAC 6 Clicks Basic Mobility (PT)  -CT  AM-PAC 6 Clicks Daily Activity (OT)  -KR      02/21/18 1400 02/20/18 1500       How much help from another person do you currently need...    Turning from your back to your side while in flat bed without using bedrails? 3  -CT 3  -CT     Moving from lying on back to  sitting on the side of a flat bed without bedrails? 2  -CT 2  -CT     Moving to and from a bed to a chair (including a wheelchair)? 3  -CT 3  -CT     Standing up from a chair using your arms (e.g., wheelchair, bedside chair)? 3  -CT 3  -CT     Climbing 3-5 steps with a railing? 2  -CT 2  -CT     To walk in hospital room? 2  -CT 2  -CT     AM-PAC 6 Clicks Score 15  -CT 15  -CT     Functional Assessment    Outcome Measure Options AM-PAC 6 Clicks Basic Mobility (PT)  -CT AM-PAC 6 Clicks Basic Mobility (PT)  -CT       User Key  (r) = Recorded By, (t) = Taken By, (c) = Cosigned By    Initials Name Provider Type    KR Christian Bill OT Occupational Therapist    CT Scarlet Samano PT Physical Therapist           Time Calculation:         PT Charges       02/22/18 1411 02/22/18 1410       Time Calculation    PT Received On 02/22/18  -CT 02/22/18  -CT     PT - Next Appointment  02/23/18  -CT     PT Goal Re-Cert Due Date  03/06/18  -CT     Time Calculation- PT    Total Timed Code Minutes- PT 28 minute(s)   PM  -CT 32 minute(s)   AM  -CT     TCU Minutes- PT --  -CT        User Key  (r) = Recorded By, (t) = Taken By, (c) = Cosigned By    Initials Name Provider Type    CT Scarlet Samano PT Physical Therapist          Therapy Charges for Today     Code Description Service Date Service Provider Modifiers Qty    88736054129 HC GAIT TRAINING EA 15 MIN 2/21/2018 Scarlet Samano, PT GP 1    89862313073 HC PT THER PROC EA 15 MIN 2/21/2018 Scarlet Samano, PT GP 1    74270490440 HC PT THER SUPP EA 15 MIN 2/21/2018 Scarlet Samano, PT GP 2    74238182031 HC GAIT TRAINING EA 15 MIN 2/22/2018 Scarlet Samano, PT GP 1    90037297855 HC PT THER PROC EA 15 MIN 2/22/2018 Scarlet Samano, PT GP 1    59704921462 HC PT THER SUPP EA 15 MIN 2/22/2018 Scarlet Samano, PT GP 2    60694032317 HC GAIT TRAINING EA 15 MIN 2/22/2018 Scarlet Samano, PT GP 1    15304226226 HC PT THER PROC EA 15 MIN 2/22/2018 Scarlet Samano, PT GP 1    [id number]  PT THER SUPP  EA 15 MIN 2/22/2018 Scarlet Samano, PT GP 2          PT G-Codes  Outcome Measure Options: AM-PAC 6 Clicks Basic Mobility (PT)  Score: 15  Functional Limitation: Mobility: Walking and moving around  Mobility: Walking and Moving Around Current Status (): At least 40 percent but less than 60 percent impaired, limited or restricted  Mobility: Walking and Moving Around Goal Status (): At least 20 percent but less than 40 percent impaired, limited or restricted    Scarlet Samano, PT  2/22/2018

## 2018-02-22 NOTE — PLAN OF CARE
Problem: Patient Care Overview (Adult)  Goal: Plan of Care Review  Outcome: Ongoing (interventions implemented as appropriate)   02/21/18 2245   Coping/Psychosocial Response Interventions   Plan Of Care Reviewed With patient   Patient Care Overview   Progress no change       Problem: Pain, Acute (Adult)  Goal: Acceptable Pain Control/Comfort Level  Outcome: Ongoing (interventions implemented as appropriate)   02/21/18 2245   Pain, Acute (Adult)   Acceptable Pain Control/Comfort Level making progress toward outcome       Problem: Fall Risk (Adult)  Goal: Absence of Falls  Outcome: Ongoing (interventions implemented as appropriate)   02/21/18 2245   Fall Risk (Adult)   Absence of Falls making progress toward outcome

## 2018-02-22 NOTE — PROGRESS NOTES
Inpatient Progress Note  Lisa Calloway  Date: 02/22/18  MRN: [id number]      Subjective:  Lisa Calloway is a 78 y.o. female POD#2 Right ANUPAMA. Patient is up in bedside chair this morning eating breakfast. She complains of moderate pain. She states she was able to walk a few feet with physical therapy yesterday. She denies paresthesias. She has no new complaints today.     Objective:    Vitals:    02/21/18 1813 02/21/18 1853 02/22/18 0426 02/22/18 0637   BP: 157/73  145/72 126/47   BP Location: Right arm  Right arm Right arm   Patient Position: Lying  Lying Lying   Pulse: 96  97 92   Resp: 18 18 18   Temp: 98.1 °F (36.7 °C)  97.9 °F (36.6 °C) 98.8 °F (37.1 °C)   TempSrc: Oral  Oral Oral   SpO2: 95% 98% 97%    Weight:       Height:         Examination of the right hip reveals clean and dry and incision. No surrounding erythema. Mild swelling right hip. No calf tenderness. Active dorsiflexion and plantarflexion. Neurovascular status is intact.     Labs:      Results from last 7 days  Lab Units 02/22/18  0106 02/21/18  0126 02/20/18  1312   WBC 10*3/mm3 8.97 9.95 11.99   HEMOGLOBIN g/dL 8.3* 8.9* 10.5*   HEMATOCRIT % 27.3* 28.0* 32.8*   MCV fL 94.5* 92.7 89.9   MCHC g/dL 30.4* 31.8* 32.0*   PLATELETS 10*3/mm3 239 266 298           Results from last 7 days  Lab Units 02/22/18  0106 02/21/18  0126 02/20/18  2214 02/20/18  1312   SODIUM mmol/L 139 139  --  140   POTASSIUM mmol/L 3.9 3.9 3.7 3.4*   MAGNESIUM mg/dL 2.2 1.7  --   --    CHLORIDE mmol/L 106 107  --  106   CO2 mmol/L 26.0 24.2*  --  26.1   BUN mg/dL 14 19  --  21   CREATININE mg/dL 0.66 0.79  --  0.73   EGFR IF NONAFRICN AM mL/min/1.73 86 70  --  77   CALCIUM mg/dL 8.0 8.3  --  8.4   GLUCOSE mg/dL 155* 145*  --  169*   ALBUMIN g/dL 3.40 3.50  --   --    BILIRUBIN mg/dL 0.2 0.3  --   --    ALK PHOS U/L 109* 134*  --   --    AST (SGOT) U/L 113* 385*  --   --    ALT (SGPT) U/L 294* 508*  --   --    Estimated Creatinine Clearance: 45 mL/min (by C-G formula based  on Cr of 0.66).  No results found for: AMMONIA          No results found for: HGBA1C  No results found for: TSH, FREET4        Radiology:  Imaging Results (last 72 hours)     Procedure Component Value Units Date/Time    XR Hip With or Without Pelvis 1 View Right [[id number]] Collected:  02/20/18 1107     Updated:  02/20/18 1111    Narrative:       EXAMINATION: XR HIP W OR WO PELVIS 2-3 VIEW RIGHT-      CLINICAL INDICATION:Postoperative; M25.551-Pain in right hip;  M16.11-Unilateral primary osteoarthritis, right hip; Z01.818-Encounter  for other preprocedural examination        COMPARISON: None      TECHNIQUE: Single view right hip     FINDINGS:   Status post right hip arthroplasty. Anatomic alignment. No radiographic  evidence of hardware complication.           Impression:       Status post right hip arthroplasty without complicating feature  identified.     This report was finalized on 2/20/2018 11:07 AM by Dr. Christian Regan MD.               Assessment:      ICD-10-CM ICD-9-CM   1. Right hip pain M25.551 719.45   2. Primary osteoarthritis of right hip M16.11 715.15   3. Pre-op testing Z01.818 V72.84       Plan:  POD#2 Right ANUPAMA. Doing well. Pain controlled. Physical therapy  today per protocol with toe touch weight bearing status. Will continue to watch H&H. Continue Eliquis for DVT prophylaxis.        JANA Vora

## 2018-02-22 NOTE — PROGRESS NOTES
Subjective     History:   Lisa Calloway is a 78 y.o. female admitted on 2/20/2018 secondary to Right hip pain     Procedures:   2/20/18: Right ANUPAMA    Patient seen and examined with JAGDEEP Weston. Awake and alert. Currently sitting in bedside chair. States she feels better today with improvement in her RLE pain. Reports some indigestion overnight. Denies CP, dyspnea or palpitations. (+) flatus, no BM. No acute events overnight per RN.     History taken from: patient, chart, and RN.      Objective     Vital Signs  Temp:  [97.9 °F (36.6 °C)-98.8 °F (37.1 °C)] 98.8 °F (37.1 °C)  Heart Rate:  [92-97] 92  Resp:  [18] 18  BP: (126-157)/(47-73) 126/47    Intake/Output Summary (Last 24 hours) at 02/22/18 1024  Last data filed at 02/22/18 0426   Gross per 24 hour   Intake             1200 ml   Output                0 ml   Net             1200 ml         Physical Exam:  General:    Awake, alert, in no acute distress   Heart:      Normal S1 and S2. Regular rate and rhythm. (+) JELENA   Lungs:     Respirations regular, even and unlabored. Lungs clear to auscultation B/L. No wheezes, rales or rhonchi.   Abdomen:   Soft and nontender. No guarding, rebound tenderness or  organomegaly noted. Bowel sounds present x 4.   Extremities:  Tr LE edema noted, R>L. Moves UE and LE equally B/L.     Results Review:      Results from last 7 days  Lab Units 02/22/18  0106 02/21/18  0126 02/20/18  1312   WBC 10*3/mm3 8.97 9.95 11.99   HEMOGLOBIN g/dL 8.3* 8.9* 10.5*   PLATELETS 10*3/mm3 239 266 298       Results from last 7 days  Lab Units 02/22/18  0106 02/21/18  0126 02/20/18  2214 02/20/18  1312   SODIUM mmol/L 139 139  --  140   POTASSIUM mmol/L 3.9 3.9 3.7 3.4*   CHLORIDE mmol/L 106 107  --  106   CO2 mmol/L 26.0 24.2*  --  26.1   BUN mg/dL 14 19  --  21   CREATININE mg/dL 0.66 0.79  --  0.73   CALCIUM mg/dL 8.0 8.3  --  8.4   GLUCOSE mg/dL 155* 145*  --  169*       Results from last 7 days  Lab Units 02/22/18  0106 02/21/18  0126   BILIRUBIN  mg/dL 0.2 0.3   ALK PHOS U/L 109* 134*   AST (SGOT) U/L 113* 385*   ALT (SGPT) U/L 294* 508*       Results from last 7 days  Lab Units 02/22/18  0106 02/21/18  0126   MAGNESIUM mg/dL 2.2 1.7               Imaging Results (last 24 hours)     Procedure Component Value Units Date/Time    US Abdomen Complete [[id number]] Collected:  02/22/18 0544     Updated:  02/22/18 0547    Narrative:       EXAMINATION: US ABDOMEN COMPLETE-         CLINICAL INDICATION:     Elevated liver enzymes; M25.551-Pain in right  hip; M16.11-Unilateral primary osteoarthritis, right hip;  Z01.818-Encounter for other preprocedural examination     TECHNIQUE: Multiplanar gray scale ultrasound of the abdomen.      COMPARISON: NONE      FINDINGS:   Visualized pancreas is unremarkable.   Prior cholecystectomy.  The common bile duct measures 5 mm and is within normal limits. .  There is mild diffuse fatty infiltration of the liver. No focal lesion  or intrahepatic biliary dilatation identified.  Spleen is not enlarged measuring 7.5 cm in length without focal splenic  abnormality.   The RIGHT kidney measures 6.9 cm  in length without mass,  hydronephrosis, or shadowing stone.   The LEFT kidney measures 9.1 cm in length without mass, hydronephrosis,  or shadowing stone.   No ascites demonstrated.   IVC shows patency.  There is normal directional flow within the portal  vein.  Partially visualized aorta appears grossly normal in caliber.       Impression:       1. Mild fatty infiltration of liver.  2. Prior cholecystectomy.  3. Otherwise unremarkable exam.      This report was finalized on 2/22/2018 5:45 AM by Dr. Christian Regan MD.               Medications:    amitriptyline 20 mg Oral Nightly   apixaban 2.5 mg Oral Q12H   aspirin 81 mg Oral Daily   bacitracin + polymyxin in normal saline 3000 mL IRRIGATION  Irrigation Once   baclofen 10 mg Oral TID   busPIRone 10 mg Oral 4x Daily   calcium carb-cholecalciferol 1 tablet Oral Daily   celecoxib 200 mg Oral  BID   cetirizine 5 mg Oral Daily   fluticasone 1 spray Each Nare Daily   furosemide 40 mg Oral Daily   gabapentin 600 mg Oral Q8H   multivitamin 1 tablet Oral Daily   NIFEdipine CC 30 mg Oral Q24H   pantoprazole 40 mg Oral QAM              Assessment/Plan   -Right hip OA s/p right ANUPAMA: Cont care per primary team. PT/OT. Pain control. SS working on SNF placement.        -Essential HTN: BP currently stable. Cont current dose of Procardia (reduced from home dose). Cont to monitor.      -Hypokalemia: Now resolved. Mg is now >2. Cont electrolyte replacement protocols and repeat labs in the AM.     -Elevated liver enzymes: Much improved today. Medication list reviewed by pharmacy. Viral hepatitis panel is non-reactive. Abdominal US reveals mild fatty infiltration of the liver. Stopped tylenol. Pt was taking tylenol up to 4 times daily prior to admission. Possibly related to anesthesia as liver enzymes have significantly improved very quickly. If patient experienced any intraoperative hypotension this could have contributed as well. Repeat CMP in the AM.          -Chronic diastolic CHF: Appears compensated at present. Cont home Lasix.      -COPD: Appears stable at present with no acute exacerbation     -DVT prophylaxis:  Eliquis per primary       Jonathan Antoine,   02/22/18  10:24 AM

## 2018-02-23 VITALS
TEMPERATURE: 98.9 F | DIASTOLIC BLOOD PRESSURE: 46 MMHG | OXYGEN SATURATION: 99 % | BODY MASS INDEX: 24.54 KG/M2 | RESPIRATION RATE: 18 BRPM | WEIGHT: 125 LBS | SYSTOLIC BLOOD PRESSURE: 141 MMHG | HEIGHT: 60 IN | HEART RATE: 84 BPM

## 2018-02-23 LAB
ALBUMIN SERPL-MCNC: 3.5 G/DL (ref 3.4–4.8)
ALBUMIN/GLOB SERPL: 1.5 G/DL (ref 1.5–2.5)
ALP SERPL-CCNC: 110 U/L (ref 35–104)
ALT SERPL W P-5'-P-CCNC: 189 U/L (ref 10–36)
ANION GAP SERPL CALCULATED.3IONS-SCNC: 7.9 MMOL/L (ref 3.6–11.2)
AST SERPL-CCNC: 53 U/L (ref 10–30)
BASOPHILS # BLD AUTO: 0.01 10*3/MM3 (ref 0–0.3)
BASOPHILS NFR BLD AUTO: 0.1 % (ref 0–2)
BILIRUB SERPL-MCNC: 0.3 MG/DL (ref 0.2–1.8)
BUN BLD-MCNC: 14 MG/DL (ref 7–21)
BUN/CREAT SERPL: 22.6 (ref 7–25)
CALCIUM SPEC-SCNC: 8.3 MG/DL (ref 7.7–10)
CHLORIDE SERPL-SCNC: 106 MMOL/L (ref 99–112)
CO2 SERPL-SCNC: 27.1 MMOL/L (ref 24.3–31.9)
CREAT BLD-MCNC: 0.62 MG/DL (ref 0.43–1.29)
DEPRECATED RDW RBC AUTO: 47.9 FL (ref 37–54)
EOSINOPHIL # BLD AUTO: 0.18 10*3/MM3 (ref 0–0.7)
EOSINOPHIL NFR BLD AUTO: 2 % (ref 0–7)
ERYTHROCYTE [DISTWIDTH] IN BLOOD BY AUTOMATED COUNT: 14.6 % (ref 11.5–14.5)
GFR SERPL CREATININE-BSD FRML MDRD: 93 ML/MIN/1.73
GLOBULIN UR ELPH-MCNC: 2.3 GM/DL
GLUCOSE BLD-MCNC: 123 MG/DL (ref 70–110)
GLUCOSE BLDC GLUCOMTR-MCNC: 129 MG/DL (ref 70–130)
HCT VFR BLD AUTO: 28.4 % (ref 37–47)
HGB BLD-MCNC: 8.8 G/DL (ref 12–16)
IMM GRANULOCYTES # BLD: 0.03 10*3/MM3 (ref 0–0.03)
IMM GRANULOCYTES NFR BLD: 0.3 % (ref 0–0.5)
INR PPP: 1.15 (ref 0.9–1.1)
LYMPHOCYTES # BLD AUTO: 1.57 10*3/MM3 (ref 1–3)
LYMPHOCYTES NFR BLD AUTO: 17.2 % (ref 16–46)
MCH RBC QN AUTO: 28.9 PG (ref 27–33)
MCHC RBC AUTO-ENTMCNC: 31 G/DL (ref 33–37)
MCV RBC AUTO: 93.4 FL (ref 80–94)
MONOCYTES # BLD AUTO: 0.99 10*3/MM3 (ref 0.1–0.9)
MONOCYTES NFR BLD AUTO: 10.8 % (ref 0–12)
NEUTROPHILS # BLD AUTO: 6.35 10*3/MM3 (ref 1.4–6.5)
NEUTROPHILS NFR BLD AUTO: 69.6 % (ref 40–75)
OSMOLALITY SERPL CALC.SUM OF ELEC: 283.1 MOSM/KG (ref 273–305)
PLATELET # BLD AUTO: 286 10*3/MM3 (ref 130–400)
PMV BLD AUTO: 10.4 FL (ref 6–10)
POTASSIUM BLD-SCNC: 3.6 MMOL/L (ref 3.5–5.3)
POTASSIUM BLD-SCNC: 3.7 MMOL/L (ref 3.5–5.3)
PROT SERPL-MCNC: 5.8 G/DL (ref 6–8)
PROTHROMBIN TIME: 14.9 SECONDS (ref 11–15.4)
RBC # BLD AUTO: 3.04 10*6/MM3 (ref 4.2–5.4)
SODIUM BLD-SCNC: 141 MMOL/L (ref 135–153)
WBC NRBC COR # BLD: 9.13 10*3/MM3 (ref 4.5–12.5)

## 2018-02-23 PROCEDURE — 84132 ASSAY OF SERUM POTASSIUM: CPT | Performed by: ORTHOPAEDIC SURGERY

## 2018-02-23 PROCEDURE — 80053 COMPREHEN METABOLIC PANEL: CPT | Performed by: INTERNAL MEDICINE

## 2018-02-23 PROCEDURE — 85610 PROTHROMBIN TIME: CPT | Performed by: INTERNAL MEDICINE

## 2018-02-23 PROCEDURE — 94799 UNLISTED PULMONARY SVC/PX: CPT

## 2018-02-23 PROCEDURE — 99232 SBSQ HOSP IP/OBS MODERATE 35: CPT | Performed by: INTERNAL MEDICINE

## 2018-02-23 PROCEDURE — 97110 THERAPEUTIC EXERCISES: CPT

## 2018-02-23 PROCEDURE — 85025 COMPLETE CBC W/AUTO DIFF WBC: CPT | Performed by: INTERNAL MEDICINE

## 2018-02-23 PROCEDURE — 93010 ELECTROCARDIOGRAM REPORT: CPT | Performed by: INTERNAL MEDICINE

## 2018-02-23 PROCEDURE — 25010000002 HYDROMORPHONE PER 4 MG

## 2018-02-23 PROCEDURE — 93005 ELECTROCARDIOGRAM TRACING: CPT | Performed by: INTERNAL MEDICINE

## 2018-02-23 PROCEDURE — 99024 POSTOP FOLLOW-UP VISIT: CPT | Performed by: PHYSICIAN ASSISTANT

## 2018-02-23 PROCEDURE — 82962 GLUCOSE BLOOD TEST: CPT

## 2018-02-23 PROCEDURE — 97116 GAIT TRAINING THERAPY: CPT

## 2018-02-23 RX ORDER — POTASSIUM CHLORIDE 20 MEQ/1
40 TABLET, EXTENDED RELEASE ORAL EVERY 4 HOURS
Status: COMPLETED | OUTPATIENT
Start: 2018-02-23 | End: 2018-02-23

## 2018-02-23 RX ORDER — HYDROCODONE BITARTRATE AND ACETAMINOPHEN 7.5; 325 MG/1; MG/1
2 TABLET ORAL EVERY 4 HOURS PRN
Qty: 40 TABLET | Refills: 0 | Status: SHIPPED | OUTPATIENT
Start: 2018-02-23 | End: 2018-03-02

## 2018-02-23 RX ORDER — HYDROMORPHONE HCL 110MG/55ML
PATIENT CONTROLLED ANALGESIA SYRINGE INTRAVENOUS
Status: COMPLETED
Start: 2018-02-23 | End: 2018-02-23

## 2018-02-23 RX ADMIN — POTASSIUM CHLORIDE 40 MEQ: 1500 TABLET, EXTENDED RELEASE ORAL at 05:45

## 2018-02-23 RX ADMIN — APIXABAN 2.5 MG: 2.5 TABLET, FILM COATED ORAL at 09:55

## 2018-02-23 RX ADMIN — Medication 1 TABLET: at 09:55

## 2018-02-23 RX ADMIN — BACLOFEN 10 MG: 10 TABLET ORAL at 09:55

## 2018-02-23 RX ADMIN — HYDROMORPHONE HYDROCHLORIDE 0.5 MG: 2 INJECTION INTRAMUSCULAR; INTRAVENOUS; SUBCUTANEOUS at 02:10

## 2018-02-23 RX ADMIN — GABAPENTIN 600 MG: 300 CAPSULE ORAL at 05:45

## 2018-02-23 RX ADMIN — FUROSEMIDE 40 MG: 40 TABLET ORAL at 09:55

## 2018-02-23 RX ADMIN — NIFEDIPINE 30 MG: 30 TABLET, EXTENDED RELEASE ORAL at 09:51

## 2018-02-23 RX ADMIN — GABAPENTIN 600 MG: 300 CAPSULE ORAL at 13:54

## 2018-02-23 RX ADMIN — CELECOXIB 200 MG: 200 CAPSULE ORAL at 09:50

## 2018-02-23 RX ADMIN — BUSPIRONE HYDROCHLORIDE 10 MG: 10 TABLET ORAL at 12:06

## 2018-02-23 RX ADMIN — POTASSIUM CHLORIDE 40 MEQ: 1500 TABLET, EXTENDED RELEASE ORAL at 09:51

## 2018-02-23 RX ADMIN — BUSPIRONE HYDROCHLORIDE 10 MG: 10 TABLET ORAL at 09:51

## 2018-02-23 RX ADMIN — HYDROCODONE BITARTRATE AND ACETAMINOPHEN 2 TABLET: 7.5; 325 TABLET ORAL at 14:14

## 2018-02-23 RX ADMIN — CETIRIZINE HYDROCHLORIDE 5 MG: 10 TABLET ORAL at 09:55

## 2018-02-23 RX ADMIN — HYDROCODONE BITARTRATE AND ACETAMINOPHEN 2 TABLET: 7.5; 325 TABLET ORAL at 05:56

## 2018-02-23 RX ADMIN — PANTOPRAZOLE SODIUM 40 MG: 40 TABLET, DELAYED RELEASE ORAL at 05:45

## 2018-02-23 NOTE — DISCHARGE SUMMARY
Lisa Calloway  1940  [id number]      Date of Discharge:  2/23/2018      Discharge Diagnosis:   Primary osteoarthritis right hip    Procedures Performed  Procedure(s):  TOTAL HIP ARTHROPLASTY         Hospital Course  Patient is a 78 y.o. female presented with advanced osteoarthritis of the right hip.  She's previously undergone intra-articular steroid injections with minimal improvement.  The pain is becoming debilitating and she has difficulty with activities of daily living and has to angulate with the aid of a cane secondary to the pain.  She underwent total hip arthroplasty on 2/20/2018.  Intraoperatively was uneventful.  Postoperatively x-rays were obtained revealing prosthesis in good alignment and position.  Physical therapy was consultative to begin on postoperative day 1 with toe-touch weightbearing status and up in chair.  On postoperative day 1 she was up and the bedside chair eating breakfast with complaints of moderate pain.  She states she had an episode of severe pain overnight.  Her incision was inspected and revealed no surrounding erythema or drainage.  She had mild swelling of the hip with tenderness and intact neurovascular status.  On postoperative day 2 she continued to improve and was able to ambulate approximately 10 feet with physical therapy and tolerated well.  By postoperative day 3 pain had lessened and she was able to ambulate to the nurse's station a and back.  Throughout her stay her vital signs were stable and she was afebrile.  Her white blood count hemoglobin within normal limits.  Her stay was fairly uneventful and she was deemed medically stable and discharged to the nursing home on 2/23/2018.  There they will continue with her physical therapy.  She will continue Eliquis 2.5 mg 1 by mouth twice a day for DVT prophylaxis.  She will return to the clinic in 10 days for a repeat x-rays and follow-up visit.      Consults:   Consults     Date and Time Order Name Status  Description    2/20/2018 1043 Inpatient Consult to Hospitalist Completed             Condition on Discharge:  Stable      Vital Signs  Vitals:    02/23/18 0420 02/23/18 0600 02/23/18 0800 02/23/18 0950   BP: 137/52 98/65  145/62   BP Location: Right arm Right arm     Patient Position: Lying Lying     Pulse: 84 74 94 89   Resp: 19 18     Temp: 98.2 °F (36.8 °C) 97.9 °F (36.6 °C)     TempSrc: Oral Oral     SpO2:   99%    Weight:       Height:             Discharge Disposition  Skilled Nursing Facility (DC - External)      Discharge Medications   Elli Lisa R   Home Medication Instructions MARYANN:[id number]    Printed on:02/23/18 1122   Medication Information                      amitriptyline (ELAVIL) 10 MG tablet  Take 20 mg by mouth Every Night.             apixaban (ELIQUIS) 2.5 MG tablet tablet  Take 1 tablet by mouth Every 12 (Twelve) Hours for 18 days.             aspirin 81 MG tablet  Take 1 tablet by mouth Daily. Prior to [facility] Admission, Patient was on: on hold since saturday             baclofen (LIORESAL) 10 MG tablet  Take 1 tablet by mouth 3 (Three) Times a Day.             busPIRone (BUSPAR) 10 MG tablet  Take 10 mg by mouth 4 (Four) Times a Day.             Calcium-Phosphorus-Vitamin D (CITRACAL +D3 PO)  Take 1 tablet by mouth 2 (Two) Times a Day.             fluticasone (FLONASE) 50 MCG/ACT nasal spray  1 spray into each nostril Daily.             furosemide (LASIX) 40 MG tablet  Take 1 tablet by mouth Daily for 180 days.             gabapentin (NEURONTIN) 600 MG tablet  Take 600 mg by mouth 3 (Three) Times a Day.             HYDROcodone-acetaminophen (NORCO) 7.5-325 MG per tablet  Take 2 tablets by mouth Every 4 (Four) Hours As Needed for Severe Pain  for up to 7 days.             loratadine (CLARITIN) 10 MG tablet  Take 10 mg by mouth Daily.             Multiple Vitamin (MULTI VITAMIN DAILY) tablet  Take 1 tablet by mouth Daily.             NIFEdipine XL (PROCARDIA XL) 30 MG 24 hr tablet  Take  30 mg by mouth 3 (Three) Times a Day.             omeprazole (PriLOSEC) 20 MG capsule  Take 20 mg by mouth 2 (Two) Times a Day.             potassium chloride (K-DUR,KLOR-CON) 10 MEQ CR tablet  Take 10 mEq by mouth 3 (Three) Times a Day.             PROAIR  (90 BASE) MCG/ACT inhaler  Inhale 2 puffs Every 4 (Four) Hours As Needed for Wheezing.             traMADol (ULTRAM) 50 MG tablet  Take 50 mg by mouth Every 6 (Six) Hours As Needed for Moderate Pain .                   Follow-up Appointments  Future Appointments  Date Time Provider Department Center   7/27/2018 3:30 PM Flako Lam IV, MD [facility] None     Additional Instructions for the Follow-ups that You Need to Schedule     Discharge Follow-up with Specified Provider: Dr. Fisher    As directed    To:  Dr. Fisher    Follow Up Details:  10 days

## 2018-02-23 NOTE — PLAN OF CARE
Problem: Patient Care Overview (Adult)  Goal: Plan of Care Review  Outcome: Ongoing (interventions implemented as appropriate)    Goal: Adult Individualization and Mutuality  Outcome: Ongoing (interventions implemented as appropriate)    Goal: Discharge Needs Assessment  Outcome: Ongoing (interventions implemented as appropriate)      Problem: Perioperative Period (Adult)  Goal: Signs and Symptoms of Listed Potential Problems Will be Absent or Manageable (Perioperative Period)  Outcome: Ongoing (interventions implemented as appropriate)      Problem: Pain, Acute (Adult)  Goal: Acceptable Pain Control/Comfort Level  Outcome: Ongoing (interventions implemented as appropriate)

## 2018-02-23 NOTE — PLAN OF CARE
Problem: Patient Care Overview (Adult)  Goal: Plan of Care Review  Outcome: Ongoing (interventions implemented as appropriate)   02/22/18 2126   Coping/Psychosocial Response Interventions   Plan Of Care Reviewed With patient   Patient Care Overview   Progress no change       Problem: Pain, Acute (Adult)  Goal: Acceptable Pain Control/Comfort Level  Outcome: Ongoing (interventions implemented as appropriate)   02/22/18 2126   Pain, Acute (Adult)   Acceptable Pain Control/Comfort Level making progress toward outcome       Problem: Fall Risk (Adult)  Goal: Absence of Falls  Outcome: Ongoing (interventions implemented as appropriate)   02/22/18 2126   Fall Risk (Adult)   Absence of Falls making progress toward outcome

## 2018-02-23 NOTE — PROGRESS NOTES
Discharge Planning Assessment   Miky     Patient Name: Lisa Calloway  MRN: [id number]  Today's Date: 2/23/2018    Admit Date: 2/20/2018       Discharge Plan       02/23/18 1353    Final Note    Final Note Pt is being discharged to [facility] today. SS left message for Lovely at [facility] and spoke to her earlier today. SS faxed information including prescription and AVS to [facility]. Nurse called report to [facility]. SS contacted emergency contact/nephewSg at [phone] to make aware that pt is being discharged to [facility] today. Pt and family agreeable to discharge today. SS completed [facility] PCS form and contacted [facility] per Norm. EMS states plans to pick pt up by 14:15. No other needs identified.         Discharge Placement     Facility/Agency Request Status Selected? Address Phone Number Fax Number    [facility] Accepted     [address], [facility] [address] [phone]        Expected Discharge Date and Time     Expected Discharge Date Expected Discharge Time    Feb 23, 2018                Miracle Clark

## 2018-02-23 NOTE — PROGRESS NOTES
Inpatient Progress Note  Lisa Calloway  Date: 02/23/18  MRN: [id number]      Subjective:  Lisa Calloway is a 78 y.o. female POD#3 Right ANUPAMA. Patient is up in bedside chair this morning eating breakfast. She complains of moderate pain. She states she was able to walk to nurses station and back  with physical therapy yesterday. She denies paresthesias. She has no new complaints today.     Objective:    Vitals:    02/22/18 1822 02/22/18 1949 02/23/18 0420 02/23/18 0600   BP: 149/57  137/52 98/65   BP Location: Right arm  Right arm Right arm   Patient Position: Lying  Lying Lying   Pulse: 100 98 84 74   Resp: 18 19 18   Temp: 99.1 °F (37.3 °C)  98.2 °F (36.8 °C) 97.9 °F (36.6 °C)   TempSrc: Oral  Oral Oral   SpO2:  97%     Weight:       Height:         Examination of the right hip reveals clean and dry and incision. No surrounding erythema. Mild swelling right hip. No calf tenderness. Active dorsiflexion and plantarflexion. Neurovascular status is intact.     Labs:      Results from last 7 days  Lab Units 02/23/18  0119 02/22/18  0106 02/21/18  0126   WBC 10*3/mm3 9.13 8.97 9.95   HEMOGLOBIN g/dL 8.8* 8.3* 8.9*   HEMATOCRIT % 28.4* 27.3* 28.0*   MCV fL 93.4 94.5* 92.7   MCHC g/dL 31.0* 30.4* 31.8*   PLATELETS 10*3/mm3 286 239 266   INR  1.15*  --   --            Results from last 7 days  Lab Units 02/23/18  0119 02/22/18  0106 02/21/18  0126   SODIUM mmol/L 141 139 139   POTASSIUM mmol/L 3.6 3.9 3.9   MAGNESIUM mg/dL  --  2.2 1.7   CHLORIDE mmol/L 106 106 107   CO2 mmol/L 27.1 26.0 24.2*   BUN mg/dL 14 14 19   CREATININE mg/dL 0.62 0.66 0.79   EGFR IF NONAFRICN AM mL/min/1.73 93 86 70   CALCIUM mg/dL 8.3 8.0 8.3   GLUCOSE mg/dL 123* 155* 145*   ALBUMIN g/dL 3.50 3.40 3.50   BILIRUBIN mg/dL 0.3 0.2 0.3   ALK PHOS U/L 110* 109* 134*   AST (SGOT) U/L 53* 113* 385*   ALT (SGPT) U/L 189* 294* 508*   Estimated Creatinine Clearance: 45 mL/min (by C-G formula based on Cr of 0.62).  No results found for: AMMONIA          No  results found for: HGBA1C  No results found for: TSH, FREET4        Radiology:  Imaging Results (last 72 hours)     Procedure Component Value Units Date/Time    XR Hip With or Without Pelvis 1 View Right [[id number]] Collected:  02/20/18 1107     Updated:  02/20/18 1111    Narrative:       EXAMINATION: XR HIP W OR WO PELVIS 2-3 VIEW RIGHT-      CLINICAL INDICATION:Postoperative; M25.551-Pain in right hip;  M16.11-Unilateral primary osteoarthritis, right hip; Z01.818-Encounter  for other preprocedural examination        COMPARISON: None      TECHNIQUE: Single view right hip     FINDINGS:   Status post right hip arthroplasty. Anatomic alignment. No radiographic  evidence of hardware complication.           Impression:       Status post right hip arthroplasty without complicating feature  identified.     This report was finalized on 2/20/2018 11:07 AM by Dr. Christian Regan MD.               Assessment:      ICD-10-CM ICD-9-CM   1. Right hip pain M25.551 719.45   2. Primary osteoarthritis of right hip M16.11 715.15   3. Pre-op testing Z01.818 V72.84       Plan:  POD#3 Right ANUPAMA. Doing well. Pain controlled. Physical therapy  today per protocol with toe touch weight bearing status. Will continue to watch H&H. Continue Eliquis for DVT prophylaxis.  Likely transfer to [facility] today.       JANA Vora

## 2018-02-23 NOTE — THERAPY TREATMENT NOTE
Acute Care - Physical Therapy Treatment Note   Miky     Patient Name: Lisa Calloway  : 1940  MRN: [id number]  Today's Date: 2018  Onset of Illness/Injury or Date of Surgery Date: 18  Date of Referral to PT: 18  Referring Physician: Phillip    Admit Date: 2018    Visit Dx:    ICD-10-CM ICD-9-CM   1. Right hip pain M25.551 719.45   2. Primary osteoarthritis of right hip M16.11 715.15   3. Pre-op testing Z01.818 V72.84     Patient Active Problem List   Diagnosis   • Essential hypertension   • Osteoarthritis (arthritis due to wear and tear of joints)   • Raynaud disease   • Anxiety   • COPD (chronic obstructive pulmonary disease)   • GERD (gastroesophageal reflux disease)   • Chronic diastolic heart failure   • Palpitations   • Polypharmacy   • Spondylolisthesis, lumbar region   • Spinal stenosis, lumbar region, with neurogenic claudication   • Spondylolisthesis, acquired   • Acquired spondylolisthesis   • Primary osteoarthritis of right hip   • Localized edema   • Right hip pain               Adult Rehabilitation Note       18 1338 18 1405 18 1444    Rehab Assessment/Intervention    Discipline physical therapist  -CT physical therapist  -CT physical therapist  -CT    Document Type therapy note (daily note)  -CT therapy note (daily note)   BID treatment  -CT therapy note (daily note)   AM treatment only  -CT    Subjective Information agree to therapy  -CT agree to therapy  -CT agree to therapy;complains of;pain  -CT    Patient Effort, Rehab Treatment good  -CT good  -CT fair  -CT    Treatment Not Performed, Comment Pt declined PM treatment session d/t being discharged and awaiting EMS transport.   -CT      Precautions/Limitations fall precautions;oxygen therapy device and L/min  -CT fall precautions;oxygen therapy device and L/min;hip precautions- right  -CT fall precautions;oxygen therapy device and L/min;hip precautions- right  -CT    Patient Response to Treatment  Pt tolerated AM treatment session well with rest breaks provided as needed.   -CT Pt tolerated both AM and PM treatment sessions well with rest breaks provided as needed. Pt able to increase ambulation distance today.   -CT Pt tolerated AM treatment session well with rest break provided as needed. Pt declined PM treatment session d/t increase pain.   -CT    Recorded by [CT] Scarlet Samano, PT [CT] Scarlet Samano, PT [CT] Scarlet Samano, PT    Pain Assessment    Pain Assessment 0-10  -CT 0-10  -CT 0-10  -CT    Pain Score 2  -CT 4  -CT 8  -CT    Pain Type Surgical pain  -CT Surgical pain  -CT Surgical pain  -CT    Pain Location Hip  -CT Hip  -CT Hip  -CT    Pain Orientation Right  -CT Right  -CT Right  -CT    Pain Intervention(s) Repositioned;Rest  -CT Repositioned;Rest  -CT Repositioned;Rest  -CT    Recorded by [CT] Scarlet Samano, PT [CT] Scarlet Samano, PT [CT] Scarlet Samano, PT    Cognitive Assessment/Intervention    Current Cognitive/Communication Assessment functional  -CT functional  -CT functional  -CT    Orientation Status oriented x 4  -CT oriented x 4  -CT oriented x 4  -CT    Personal Safety Interventions fall prevention program maintained;gait belt;muscle strengthening facilitated;nonskid shoes/slippers when out of bed  -CT fall prevention program maintained;gait belt;muscle strengthening facilitated;nonskid shoes/slippers when out of bed  -CT fall prevention program maintained;gait belt;muscle strengthening facilitated;nonskid shoes/slippers when out of bed  -CT    Recorded by [CT] Scarlet Samano, PT [CT] Scarlet Samano, PT [CT] Scarlet Samano, PT    Mobility Assessment/Training    Right Lower Extremity Weight-Bearing partial weight-bearing  -CT partial weight-bearing  -CT partial weight-bearing  -CT    Recorded by [CT] Scarlet Samano, PT [CT] Scarlet Samano, PT [CT] Scarlet Samano, PT    Bed Mobility, Assessment/Treatment    Bed Mobility, Assistive Device bed rails  -CT bed rails  -CT bed rails  -CT     Bed Mobility, Scoot/Bridge, [place] moderate assist (50% patient effort);2 person assist required  -CT moderate assist (50% patient effort);2 person assist required  -CT moderate assist (50% patient effort);2 person assist required  -CT    Bed Mob, Supine to Sit, [place] moderate assist (50% patient effort);2 person assist required  -CT moderate assist (50% patient effort);2 person assist required  -CT moderate assist (50% patient effort);2 person assist required  -CT    Bed Mob, Sit to Supine, [place] moderate assist (50% patient effort);2 person assist required;verbal cues required;nonverbal cues required (demo/gesture)  -CT moderate assist (50% patient effort);2 person assist required;verbal cues required;nonverbal cues required (demo/gesture)  -CT     Bed Mobility, Safety Issues decreased use of legs for bridging/pushing  -CT decreased use of legs for bridging/pushing  -CT decreased use of legs for bridging/pushing  -CT    Bed Mobility, Impairments strength decreased;ROM decreased  -CT strength decreased;ROM decreased  -CT strength decreased;ROM decreased  -CT    Recorded by [CT] Scarlet Samano, PT [CT] Scarlet Samano, PT [CT] Scarlet Samano, PT    Transfer Assessment/Treatment    Transfers, Bed-Chair [place] minimum assist (75% patient effort);2 person assist required;verbal cues required;nonverbal cues required (demo/gesture)  -CT minimum assist (75% patient effort);moderate assist (50% patient effort);2 person assist required;verbal cues required;nonverbal cues required (demo/gesture)  -CT minimum assist (75% patient effort);moderate assist (50% patient effort);2 person assist required;verbal cues required;nonverbal cues required (demo/gesture)  -CT    Transfers, Chair-Bed [place] minimum assist (75% patient effort);2 person assist required;verbal cues required;nonverbal cues required (demo/gesture)  -CT minimum assist (75% patient effort);moderate assist (50% patient effort);2 person  assist required;verbal cues required;nonverbal cues required (demo/gesture)  -CT minimum assist (75% patient effort);moderate assist (50% patient effort);2 person assist required;verbal cues required;nonverbal cues required (demo/gesture)  -CT    Transfers, Bed-Chair-Bed, Assist Device rolling walker  -CT rolling walker  -CT rolling walker  -CT    Transfers, Sit-Stand [place] minimum assist (75% patient effort);2 person assist required;verbal cues required;nonverbal cues required (demo/gesture)  -CT minimum assist (75% patient effort);moderate assist (50% patient effort);2 person assist required;verbal cues required;nonverbal cues required (demo/gesture)  -CT minimum assist (75% patient effort);moderate assist (50% patient effort);2 person assist required;verbal cues required;nonverbal cues required (demo/gesture)  -CT    Transfers, Stand-Sit [place] minimum assist (75% patient effort);2 person assist required;verbal cues required;nonverbal cues required (demo/gesture)  -CT minimum assist (75% patient effort);moderate assist (50% patient effort);2 person assist required;verbal cues required;nonverbal cues required (demo/gesture)  -CT minimum assist (75% patient effort);moderate assist (50% patient effort);2 person assist required;verbal cues required;nonverbal cues required (demo/gesture)  -CT    Transfers, Sit-Stand-Sit, Assist Device rolling walker  -CT rolling walker  -CT rolling walker  -CT    Transfer, Safety Issues step length decreased;balance decreased during turns  -CT step length decreased;balance decreased during turns  -CT step length decreased;balance decreased during turns  -CT    Recorded by [CT] Scarlet Samano, PT [CT] Scarlet Samano, PT [CT] Scarlet Samano, PT    Gait Assessment/Treatment    Gait, [place] Level minimum assist (75% patient effort);2 person assist required;verbal cues required;nonverbal cues required (demo/gesture)  -CT minimum assist (75% patient effort);moderate  assist (50% patient effort);2 person assist required;verbal cues required;nonverbal cues required (demo/gesture)  -CT minimum assist (75% patient effort);moderate assist (50% patient effort);2 person assist required;verbal cues required;nonverbal cues required (demo/gesture)  -CT    Gait, Assistive Device rolling walker  -CT rolling walker  -CT rolling walker  -CT    Gait, Distance (Feet) 80  -CT 60  -CT 10  -CT    Gait, Gait Pattern Analysis swing-to gait  -CT swing-to gait  -CT     Recorded by [CT] Scarlet Samano, PT [CT] Scarlet Samano, PT [CT] Scarlet Samano, PT    Therapy Exercises    Bilateral Lower Extremities AROM:;10 reps;sitting  -CT AROM:;10 reps;sitting;supine  -CT AROM:;10 reps;sitting  -CT    Recorded by [CT] Scarlet Samano, PT [CT] Scarlet Samano, PT [CT] Scarlet Samano, PT    Positioning and Restraints    Pre-Treatment Position sitting in chair/recliner  -CT  in bed  -CT    Post Treatment Position chair  -CT bed  -CT chair  -CT    In Bed  supine;call light within reach;encouraged to call for assist;exit alarm on;with family/caregiver;notified nsg  -CT     In Chair sitting;call light within reach;encouraged to call for assist;notified nsg  -CT sitting;call light within reach;encouraged to call for assist;with family/caregiver;notified nsg   in AM  -CT sitting;call light within reach;encouraged to call for assist;with family/caregiver;notified nsg  -CT    Recorded by [CT] Scarlet Samano, PT [CT] Scarlet Samano, PT [CT] Scarlet Samano, PT      User Key  (r) = Recorded By, (t) = Taken By, (c) = Cosigned By    Initials Name Effective Dates    CT Scarlet Samano PT 03/14/16 -                 IP PT Goals       02/20/18 1521          Transfer Training PT LTG    Transfer Training PT LTG, Date Established 02/20/18  -CT      Transfer Training PT LTG, Time to Achieve by discharge  -CT      Transfer Training PT LTG, Activity Type bed to chair /chair to bed;sit to stand/stand to sit  -CT      Transfer Training PT  LTG, [place] Level contact guard assist;minimum assist (75% patient effort)  -CT      Transfer Training PT LTG, Assist Device other (see comments)   with appropriate AD  -CT      Gait Training PT LTG    Gait Training Goal PT LTG, Date Established 02/20/18  -CT      Gait Training Goal PT LTG, Time to Achieve by discharge  -CT      Gait Training Goal PT LTG, [place] Level minimum assist (75% patient effort);contact guard assist  -CT      Gait Training Goal PT LTG, Assist Device other (see comments)   with appropriate AD  -CT      Gait Training Goal PT LTG, Distance to Achieve 40  -CT        User Key  (r) = Recorded By, (t) = Taken By, (c) = Cosigned By    Initials Name Provider Type    CT Scarlet Samano PT Physical Therapist          Physical Therapy Education     Title: PT OT SLP Therapies (Done)     Topic: Physical Therapy (Done)     Point: Mobility training (Done)    Learning Progress Summary    Learner Readiness Method Response Comment Documented by Status   Patient Acceptance E VU  CT 02/23/18 1346 Done    Eager E VU  JM 02/23/18 1038 Done    Acceptance E VU  CT 02/22/18 1410 Done    Acceptance E VU  CT 02/21/18 1449 Done    Acceptance E VU  CT 02/20/18 1523 Done               Point: Home exercise program (Done)    Learning Progress Summary    Learner Readiness Method Response Comment Documented by Status   Patient Acceptance E VU  CT 02/23/18 1346 Done    Eager E VU  JM 02/23/18 1038 Done    Acceptance E VU  CT 02/22/18 1410 Done    Acceptance E VU  CT 02/21/18 1449 Done    Acceptance E VU  CT 02/20/18 1523 Done               Point: Body mechanics (Done)    Learning Progress Summary    Learner Readiness Method Response Comment Documented by Status   Patient Acceptance E VU  CT 02/23/18 1346 Done    Eager E VU  JM 02/23/18 1038 Done    Acceptance E VU  CT 02/22/18 1410 Done    Acceptance E VU  CT 02/21/18 1449 Done    Acceptance E VU  CT 02/20/18 1523 Done               Point: Precautions (Done)     Learning Progress Summary    Learner Readiness Method Response Comment Documented by Status   Patient Acceptance E VU  CT 02/23/18 1346 Done    Eager E VU  JM 02/23/18 1038 Done    Acceptance E VU  CT 02/22/18 1410 Done    Acceptance E VU  CT 02/21/18 1449 Done    Acceptance E VU  CT 02/20/18 1523 Done                      User Key     Initials Effective Dates Name Provider Type Discipline    CT 03/14/16 -  Scarlet Samano, PT Physical Therapist PT     07/07/16 -  Yuliana Schmitt, RN Registered Nurse Nurse                    PT Recommendation and Plan  Anticipated Equipment Needs At Discharge:  (to be determined)  Anticipated Discharge Disposition: skilled nursing facility (per pt request)  Planned Therapy Interventions: balance training, bed mobility training, gait training, home exercise program, manual therapy techniques, motor coordination training, neuromuscular re-education, patient/family education, postural re-education, ROM (Range of Motion), strengthening, transfer training  PT Frequency: 2 times/day, 5 times/wk, per priority policy             Outcome Measures       02/23/18 1300 02/22/18 1400 02/22/18 1343    How much help from another person do you currently need...    Turning from your back to your side while in flat bed without using bedrails? 3  -CT 3  -CT     Moving from lying on back to sitting on the side of a flat bed without bedrails? 2  -CT 2  -CT     Moving to and from a bed to a chair (including a wheelchair)? 3  -CT 3  -CT     Standing up from a chair using your arms (e.g., wheelchair, bedside chair)? 3  -CT 3  -CT     Climbing 3-5 steps with a railing? 2  -CT 2  -CT     To walk in hospital room? 2  -CT 2  -CT     AM-PAC 6 Clicks Score 15  -CT 15  -CT     How much help from another is currently needed...    Putting on and taking off regular lower body clothing?   2  -KR    Bathing (including washing, rinsing, and drying)   2  -KR    Toileting (which includes using toilet bed pan or urinal)    2  -KR    Putting on and taking off regular upper body clothing   3  -KR    Taking care of personal grooming (such as brushing teeth)   3  -KR    Eating meals   3  -KR    Score   15  -KR    Functional Assessment    Outcome Measure Options AM-PAC 6 Clicks Basic Mobility (PT)  -CT AM-PAC 6 Clicks Basic Mobility (PT)  -CT       02/22/18 1300 02/21/18 1400 02/20/18 1500    How much help from another person do you currently need...    Turning from your back to your side while in flat bed without using bedrails?  3  -CT 3  -CT    Moving from lying on back to sitting on the side of a flat bed without bedrails?  2  -CT 2  -CT    Moving to and from a bed to a chair (including a wheelchair)?  3  -CT 3  -CT    Standing up from a chair using your arms (e.g., wheelchair, bedside chair)?  3  -CT 3  -CT    Climbing 3-5 steps with a railing?  2  -CT 2  -CT    To walk in hospital room?  2  -CT 2  -CT    AM-PAC 6 Clicks Score  15  -CT 15  -CT    Functional Assessment    Outcome Measure Options AM-PAC 6 Clicks Daily Activity (OT)  -KR AM-PAC 6 Clicks Basic Mobility (PT)  -CT AM-PAC 6 Clicks Basic Mobility (PT)  -CT      User Key  (r) = Recorded By, (t) = Taken By, (c) = Cosigned By    Initials Name Provider Type    CATARINA Bill OT Occupational Therapist    CT Scarlet Samano PT Physical Therapist           Time Calculation:         PT Charges       02/23/18 1347          Time Calculation    PT Received On 02/23/18  -CT      PT - Next Appointment 02/24/18  -CT      PT Goal Re-Cert Due Date 03/06/18  -CT      Time Calculation- PT    Total Timed Code Minutes- PT 34 minute(s)  -CT        User Key  (r) = Recorded By, (t) = Taken By, (c) = Cosigned By    Initials Name Provider Type    CT Scarlet Samano PT Physical Therapist          Therapy Charges for Today     Code Description Service Date Service Provider Modifiers Qty    30733919204 HC GAIT TRAINING EA 15 MIN 2/22/2018 Scarlet Samano, PT GP 1    36354743713 HC PT THER PROC EA 15  MIN 2/22/2018 Scarlet Selwyn, PT GP 1    39738286701 HC PT THER SUPP EA 15 MIN 2/22/2018 Scarlet Selwyn, PT GP 2    54444163374 HC GAIT TRAINING EA 15 MIN 2/22/2018 Scarlet Selwyn, PT GP 1    88318221310 HC PT THER PROC EA 15 MIN 2/22/2018 Scarlet Selwyn, PT GP 1    55555522040 HC PT THER SUPP EA 15 MIN 2/22/2018 Scarlet Selwyn, PT GP 2    60876823837 HC GAIT TRAINING EA 15 MIN 2/23/2018 Scarlet Selwyn, PT GP 1    39437006035 HC PT THER PROC EA 15 MIN 2/23/2018 Scarletpolina Shiell, PT GP 1    79673864978 HC PT THER SUPP EA 15 MIN 2/23/2018 Scarlet Shiell, PT GP 2          PT G-Codes  Outcome Measure Options: AM-PAC 6 Clicks Basic Mobility (PT)  Score: 15  Functional Limitation: Mobility: Walking and moving around  Mobility: Walking and Moving Around Current Status (): At least 40 percent but less than 60 percent impaired, limited or restricted  Mobility: Walking and Moving Around Goal Status (): At least 20 percent but less than 40 percent impaired, limited or restricted    Scarlet Selwyn, PT  2/23/2018

## 2018-02-23 NOTE — PROGRESS NOTES
Subjective     History:   Lisa Calloway is a 78 y.o. female admitted on 2/20/2018 secondary to Right hip pain     Procedures:   2/20/18: Right ANUPAMA    Patient seen and examined with JAGDEEP Bridges. Awake and alert. States she did not sleep as well last PM. Reports episode of chest pressure that she feels was related to an anxiety attack. Denies dyspnea or palpitations. Pain is improved and controlled. No BM. No acute events overnight per RN.   History taken from: patient, chart, and RN.      Objective     Vital Signs  Temp:  [97.9 °F (36.6 °C)-99.1 °F (37.3 °C)] 98.9 °F (37.2 °C)  Heart Rate:  [] 84  Resp:  [18-19] 18  BP: ()/(46-65) 141/46    Intake/Output Summary (Last 24 hours) at 02/23/18 1225  Last data filed at 02/23/18 0300   Gross per 24 hour   Intake              840 ml   Output                0 ml   Net              840 ml         Physical Exam:  General:    Awake, alert, in no acute distress   Heart:      Normal S1 and S2. Regular rate and rhythm. (+) JELENA   Lungs:     Respirations regular, even and unlabored. Lungs clear to auscultation B/L. No wheezes, rales or rhonchi.   Abdomen:   Soft and nontender. No guarding, rebound tenderness or  organomegaly noted. Bowel sounds present x 4.   Extremities:  Tr LE edema noted, R>L. Moves UE and LE equally B/L.     Results Review:      Results from last 7 days  Lab Units 02/23/18  0119 02/22/18  0106 02/21/18  0126 02/20/18  1312   WBC 10*3/mm3 9.13 8.97 9.95 11.99   HEMOGLOBIN g/dL 8.8* 8.3* 8.9* 10.5*   PLATELETS 10*3/mm3 286 239 266 298       Results from last 7 days  Lab Units 02/23/18  0119 02/22/18  0106 02/21/18  0126 02/20/18  2214 02/20/18  1312   SODIUM mmol/L 141 139 139  --  140   POTASSIUM mmol/L 3.6 3.9 3.9 3.7 3.4*   CHLORIDE mmol/L 106 106 107  --  106   CO2 mmol/L 27.1 26.0 24.2*  --  26.1   BUN mg/dL 14 14 19  --  21   CREATININE mg/dL 0.62 0.66 0.79  --  0.73   CALCIUM mg/dL 8.3 8.0 8.3  --  8.4   GLUCOSE mg/dL 123* 155* 145*  --  169*        Results from last 7 days  Lab Units 02/23/18  0119 02/22/18  0106 02/21/18  0126   BILIRUBIN mg/dL 0.3 0.2 0.3   ALK PHOS U/L 110* 109* 134*   AST (SGOT) U/L 53* 113* 385*   ALT (SGPT) U/L 189* 294* 508*       Results from last 7 days  Lab Units 02/22/18  0106 02/21/18  0126   MAGNESIUM mg/dL 2.2 1.7       Results from last 7 days  Lab Units 02/23/18  0119   INR  1.15*           Imaging Results (last 24 hours)     ** No results found for the last 24 hours. **            Medications:    amitriptyline 20 mg Oral Nightly   apixaban 2.5 mg Oral Q12H   aspirin 81 mg Oral Daily   bacitracin + polymyxin in normal saline 3000 mL IRRIGATION  Irrigation Once   baclofen 10 mg Oral TID   busPIRone 10 mg Oral 4x Daily   calcium carb-cholecalciferol 1 tablet Oral Daily   celecoxib 200 mg Oral BID   cetirizine 5 mg Oral Daily   fluticasone 1 spray Each Nare Daily   furosemide 40 mg Oral Daily   gabapentin 600 mg Oral Q8H   multivitamin 1 tablet Oral Daily   NIFEdipine CC 30 mg Oral Q24H   pantoprazole 40 mg Oral QAM              Assessment/Plan   -Right hip OA s/p right ANUPAMA: Cont care per primary team. PT/OT. Pain control. SS working on SNF placement.        -Essential HTN: BP currently stable. Cont current dose of Procardia (reduced from home dose). Cont to monitor. If patient is discharged to SNF today, I recommend to continue current dose of Procardia once daily rather than TID as prescribed at home.       -Hypokalemia: Now resolved. Mg is now >2. Cont electrolyte replacement protocols and repeat labs in the AM.     -Elevated liver enzymes: Overall improved and improved again today. Medication list reviewed by pharmacy. Viral hepatitis panel is non-reactive. Abdominal US reveals mild fatty infiltration of the liver. Stopped tylenol. Pt was taking tylenol up to 4 times daily prior to admission. Possibly related to anesthesia as liver enzymes have significantly improved very quickly. If patient experienced any  intraoperative hypotension this could have contributed as well. Advised patient to avoid excessive acetaminophen in the future.          -Chronic diastolic CHF: Appears compensated at present. Cont home Lasix.      -COPD: Appears stable at present with no acute exacerbation     -DVT prophylaxis:  Gaby Antoine DO  02/23/18  12:25 PM

## 2018-02-23 NOTE — PLAN OF CARE
Problem: Fall Risk (Adult)  Goal: Absence of Falls  Outcome: Ongoing (interventions implemented as appropriate)      Problem: Hip Replacement, Total (Adult)  Goal: Signs and Symptoms of Listed Potential Problems Will be Absent or Manageable (Hip Replacement, Total)  Outcome: Ongoing (interventions implemented as appropriate)

## 2018-02-24 NOTE — PAYOR COMM NOTE
"[facility]   KEVEN [facility]ICENTE   PHONE  [phone]  FAX  [phone]    PATIENT D/C 2/23/18    Lisa Burkett (78 y.o. Female)     Date of Birth Social Security Number Address Home Phone MRN    1940  [address] [place] [id number] [phone] [id number]    Sikhism Marital Status          Nondenominational        Admission Date Admission Type Admitting Provider Attending Provider Department, Room/Bed    2/20/18 Elective Oswaldo Fisher MD  [address], 3338/1P    Discharge Date Discharge Disposition Discharge Destination        2/23/2018 Skilled Nursing Facility (DC - External)             Attending Provider: (none)    Allergies:  Amlodipine, Beta Adrenergic Blockers, Ergotamine-caffeine, Lisinopril, Metoprolol, Tenormin [Atenolol], Trovan [Trovafloxacin], Atorvastatin, Cefprozil, Codeine, Contrast Dye, Coreg [Carvedilol], Fish-derived Products, Penicillins, Sectral [Acebutolol Hcl], Sulfa Antibiotics, Tetracycline    Isolation:  None   Infection:  None   Code Status:  Prior    Ht:  152.4 cm (60\")   Wt:  56.7 kg (125 lb)    Admission Cmt:  None   Principal Problem:  Right hip pain [M25.551]                 Active Insurance as of 2/20/2018     Primary Coverage     Payor Plan Insurance Group Employer/Plan Group    [facility] MEDICARE REPLACEMENT [facility] [id number]     Payor Plan Address Payor Plan Phone Number Effective From Effective To    [address] [address]  1/1/2016     [place] [address]       Subscriber Name Subscriber Birth Date Member ID       LISA BURKETT [id number]                 Emergency Contacts      (Rel.) Home Phone Work Phone Mobile Phone    Sg Dooley! (Relative) -- -- [phone]    Renita Ariza (Friend) [phone] -- --              "

## 2018-03-02 DIAGNOSIS — Z96.641 STATUS POST TOTAL REPLACEMENT OF RIGHT HIP: Primary | ICD-10-CM

## 2018-03-05 ENCOUNTER — HOSPITAL ENCOUNTER (OUTPATIENT)
Dept: GENERAL RADIOLOGY | Facility: HOSPITAL | Age: 78
Discharge: HOME | End: 2018-03-05
Attending: ORTHOPAEDIC SURGERY | Admitting: ORTHOPAEDIC SURGERY

## 2018-03-05 ENCOUNTER — OFFICE VISIT (OUTPATIENT)
Dept: ORTHOPEDIC SURGERY | Facility: CLINIC | Age: 78
End: 2018-03-05

## 2018-03-05 VITALS — WEIGHT: 125 LBS | HEIGHT: 60 IN | BODY MASS INDEX: 24.54 KG/M2

## 2018-03-05 DIAGNOSIS — Z96.641 STATUS POST TOTAL REPLACEMENT OF RIGHT HIP: Primary | ICD-10-CM

## 2018-03-05 DIAGNOSIS — Z23 NEED FOR PNEUMOCOCCAL VACCINE: ICD-10-CM

## 2018-03-05 DIAGNOSIS — Z23 NEED FOR IMMUNIZATION AGAINST INFLUENZA: ICD-10-CM

## 2018-03-05 DIAGNOSIS — Z96.641 STATUS POST TOTAL REPLACEMENT OF RIGHT HIP: ICD-10-CM

## 2018-03-05 DIAGNOSIS — M16.11 PRIMARY OSTEOARTHRITIS OF RIGHT HIP: ICD-10-CM

## 2018-03-05 DIAGNOSIS — Z22.322 CARRIER OR SUSPECTED CARRIER OF METHICILLIN RESISTANT STAPHYLOCOCCUS AUREUS (CODE): ICD-10-CM

## 2018-03-05 PROCEDURE — 73502 X-RAY EXAM HIP UNI 2-3 VIEWS: CPT

## 2018-03-05 PROCEDURE — 99024 POSTOP FOLLOW-UP VISIT: CPT | Performed by: ORTHOPAEDIC SURGERY

## 2018-03-05 PROCEDURE — 73502 X-RAY EXAM HIP UNI 2-3 VIEWS: CPT | Performed by: RADIOLOGY

## 2018-03-05 NOTE — PROGRESS NOTES
Patient: Lisa Calloway  YOB: [birth year]      HPI:  Lisa Calloway, 78 y.o. female seen today in follow up of right total hip arthroplasty 2 weeks ago.  She is doing well with minimal complaints.  She is currently in a rehabilitation facility and has been ambulating well partial weightbearing with a walker.     Medical History:  Patient Active Problem List   Diagnosis   • Essential hypertension   • Osteoarthritis (arthritis due to wear and tear of joints)   • Raynaud disease   • Anxiety   • COPD (chronic obstructive pulmonary disease)   • GERD (gastroesophageal reflux disease)   • Chronic diastolic heart failure   • Palpitations   • Polypharmacy   • Spondylolisthesis, lumbar region   • Spinal stenosis, lumbar region, with neurogenic claudication   • Spondylolisthesis, acquired   • Acquired spondylolisthesis   • Primary osteoarthritis of right hip   • Localized edema   • Right hip pain   • Status post total replacement of right hip     Past Medical History:   Diagnosis Date   • Anxiety    • Arthritis    • Calcinosis-Raynaud's sclerodactyly-telangiectasia syndrome    • CHF (congestive heart failure)    • COPD (chronic obstructive pulmonary disease)    • Diastolic heart failure 12/9/2016    Echocardiogram, 08/10/2011: LVH, LVEF 70% and no significant valvular abnormality.  Pharmacologic MPS, 07/28/2011: normal, LVEF 80%. Recurrent chest pain/CHF presentations to the [place], [place] area. Cardiac catheterization, 05/23/2012: Normal coronary arteries, LVEF 65%, LVEDP 32 mmHg. Renal angiography, 05/23/2012: normal bilateral renal arteries.  Echocardiogram for worsening dyspnea, 03/ • GERD (gastroesophageal reflux disease)    • Heart murmur    • Hiatal hernia    • High cholesterol    • History of recurrent UTIs    • History of staph infection     after hip replacement   • History of stomach ulcers    • History of transfusion    • Hypertension    • Low back pain    • Lumbar herniated disc      with right-sided sciatica:Status post lumbar fusion, 01/17/2013.   • Migraine    • Panic attacks    • SOB (shortness of breath)    • Wears dentures     upper denture and partial   • Wears eyeglasses      Current Outpatient Prescriptions:   •  amitriptyline (ELAVIL) 10 MG tablet, Take 20 mg by mouth Every Night., Disp: , Rfl:   •  apixaban (ELIQUIS) 2.5 MG tablet tablet, Take 1 tablet by mouth Every 12 (Twelve) Hours for 18 days., Disp: 36 tablet, Rfl: 0  •  aspirin 81 MG tablet, Take 1 tablet by mouth Daily. Prior to [facility] Admission, Patient was on: on hold since saturday, Disp: , Rfl:   •  baclofen (LIORESAL) 10 MG tablet, Take 1 tablet by mouth 3 (Three) Times a Day., Disp: , Rfl:   •  busPIRone (BUSPAR) 10 MG tablet, Take 10 mg by mouth 4 (Four) Times a Day., Disp: , Rfl: 4  •  Calcium-Phosphorus-Vitamin D (CITRACAL +D3 PO), Take 1 tablet by mouth 2 (Two) Times a Day., Disp: , Rfl:   •  fluticasone (FLONASE) 50 MCG/ACT nasal spray, 1 spray into each nostril Daily., Disp: , Rfl: 1  •  furosemide (LASIX) 40 MG tablet, Take 1 tablet by mouth Daily for 180 days., Disp: 90 tablet, Rfl: 1  •  gabapentin (NEURONTIN) 600 MG tablet, Take 600 mg by mouth 3 (Three) Times a Day., Disp: , Rfl:   •  loratadine (CLARITIN) 10 MG tablet, Take 10 mg by mouth Daily., Disp: , Rfl: 1  •  Multiple Vitamin (MULTI VITAMIN DAILY) tablet, Take 1 tablet by mouth Daily., Disp: , Rfl:   •  NIFEdipine XL (PROCARDIA XL) 30 MG 24 hr tablet, Take 30 mg by mouth 3 (Three) Times a Day., Disp: , Rfl:   •  omeprazole (PriLOSEC) 20 MG capsule, Take 20 mg by mouth 2 (Two) Times a Day., Disp: , Rfl:   •  potassium chloride (K-DUR,KLOR-CON) 10 MEQ CR tablet, Take 10 mEq by mouth 3 (Three) Times a Day., Disp: , Rfl:   •  PROAIR  (90 BASE) MCG/ACT inhaler, Inhale 2 puffs Every 4 (Four) Hours As Needed for Wheezing., Disp: , Rfl:   •  traMADol (ULTRAM) 50 MG tablet, Take 50 mg by mouth Every 6 (Six) Hours As Needed for Moderate Pain ., Disp: ,  Rfl:   •  mupirocin (BACTROBAN) 2 % ointment, , Disp: , Rfl: 0  Current outpatient and discharge medications have been reconciled for the patient.  Rosita Fisher RN    Surgical History:  Past Surgical History:   Procedure Laterality Date   • ABDOMINAL SURGERY     • APPENDECTOMY  1975   • BACK SURGERY      lumbar fusion   • BREAST SURGERY      Biopsy   • CARDIAC SURGERY      cardiac cath   • CHOLECYSTECTOMY  1996   • EYE SURGERY      cataract surgery   • HIP SURGERY     • HYSTERECTOMY  1999   • JOINT REPLACEMENT      hip left   • LUMBAR LAMINECTOMY WITH FUSION N/A 3/17/2017    Procedure: LUMBAR FUSION DECOMPRESSON WITH PEDICLE SCREWS L3-4 ;  Surgeon: Sen Skinner MD;  Location: [facility] OR;  Service:    • OTHER SURGICAL HISTORY      arthrodesis lumbar   • OTHER SURGICAL HISTORY  1982    neuroplasty decompression median nerve at carpal tunnel   • TONSILLECTOMY  1963   • TOTAL HIP ARTHROPLASTY Right 2/20/2018    Procedure: TOTAL HIP ARTHROPLASTY;  Surgeon: Oswaldo Fisher MD;  Location: [facility] OR;  Service:    • TUBAL ABDOMINAL LIGATION  1975     Body mass index is 24.41 kg/(m^2).    Radiographs:  3 views right hip show total hip arthroplasty in good position and alignment.       Examination:  Right Hip: Examination reveal incision intact, well approximated, without drainage or erythema. She has equal leg lengths and good mobility of her hip.    Assessment:   78 y.o. female now 13 day(s) out from right total hip arthroplasty.  She is doing well.  She will continue partial weightbearing for 4 weeks and stay in rehabilitation Center for 4-6 weeks.  We will reevaluate in 6 weeks' time.     Diagnosis Plan   1. Status post total replacement of right hip     2. Primary osteoarthritis of right hip     3. 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       Cc:  Tobin Acosta MD    Scribed for Oswaldo Rolon  MD Phillip by Rosita Fisher RN.9:28 AM 03/05/2018

## 2018-04-12 DIAGNOSIS — Z96.651 S/P TOTAL KNEE ARTHROPLASTY, RIGHT: Primary | ICD-10-CM

## 2018-04-16 ENCOUNTER — OFFICE VISIT (OUTPATIENT)
Dept: ORTHOPEDIC SURGERY | Facility: CLINIC | Age: 78
End: 2018-04-16

## 2018-04-16 ENCOUNTER — HOSPITAL ENCOUNTER (OUTPATIENT)
Dept: GENERAL RADIOLOGY | Facility: HOSPITAL | Age: 78
Discharge: HOME | End: 2018-04-16
Attending: ORTHOPAEDIC SURGERY | Admitting: ORTHOPAEDIC SURGERY

## 2018-04-16 VITALS — BODY MASS INDEX: 25.13 KG/M2 | HEIGHT: 60 IN | WEIGHT: 128 LBS

## 2018-04-16 DIAGNOSIS — Z96.651 S/P TOTAL KNEE ARTHROPLASTY, RIGHT: ICD-10-CM

## 2018-04-16 DIAGNOSIS — Z96.641 STATUS POST TOTAL REPLACEMENT OF RIGHT HIP: Primary | ICD-10-CM

## 2018-04-16 PROCEDURE — 73502 X-RAY EXAM HIP UNI 2-3 VIEWS: CPT | Performed by: RADIOLOGY

## 2018-04-16 PROCEDURE — 99024 POSTOP FOLLOW-UP VISIT: CPT | Performed by: ORTHOPAEDIC SURGERY

## 2018-04-16 PROCEDURE — 73502 X-RAY EXAM HIP UNI 2-3 VIEWS: CPT

## 2018-04-16 NOTE — PROGRESS NOTES
Patient: Lisa Calloway  YOB: 1940  Date of Encounter: 04/16/2018      HPI:  Lisa Calloway, 78 y.o. female seen today in follow up right total hip arthroplasty 02/20/2018, now 8 weeks out. She is at home now and is doing well.     Medical History:  Patient Active Problem List   Diagnosis   • Essential hypertension   • Osteoarthritis (arthritis due to wear and tear of joints)   • Raynaud disease   • Anxiety   • COPD (chronic obstructive pulmonary disease)   • GERD (gastroesophageal reflux disease)   • Chronic diastolic heart failure   • Palpitations   • Polypharmacy   • Spondylolisthesis, lumbar region   • Spinal stenosis, lumbar region, with neurogenic claudication   • Spondylolisthesis, acquired   • Acquired spondylolisthesis   • Primary osteoarthritis of right hip   • Localized edema   • Right hip pain   • Status post total replacement of right hip     Past Medical History:   Diagnosis Date   • Anxiety    • Arthritis    • Calcinosis-Raynaud's sclerodactyly-telangiectasia syndrome    • CHF (congestive heart failure)    • COPD (chronic obstructive pulmonary disease)    • Diastolic heart failure 12/9/2016    Echocardiogram, 08/10/2011: LVH, LVEF 70% and no significant valvular abnormality.  Pharmacologic MPS, 07/28/2011: normal, LVEF 80%. Recurrent chest pain/CHF presentations to the [place], [place] area. Cardiac catheterization, 05/23/2012: Normal coronary arteries, LVEF 65%, LVEDP 32 mmHg. Renal angiography, 05/23/2012: normal bilateral renal arteries.  Echocardiogram for worsening dyspnea, 03/ • GERD (gastroesophageal reflux disease)    • Heart murmur    • Hiatal hernia    • High cholesterol    • History of recurrent UTIs    • History of staph infection     after hip replacement   • History of stomach ulcers    • History of transfusion    • Hypertension    • Low back pain    • Lumbar herniated disc     with right-sided sciatica:Status post lumbar fusion, 01/17/2013.   • Migraine    •  Panic attacks    • SOB (shortness of breath)    • Wears dentures     upper denture and partial   • Wears eyeglasses        Surgical History:  Past Surgical History:   Procedure Laterality Date   • ABDOMINAL SURGERY     • APPENDECTOMY  1975   • BACK SURGERY      lumbar fusion   • BREAST SURGERY      Biopsy   • CARDIAC SURGERY      cardiac cath   • CHOLECYSTECTOMY  1996   • EYE SURGERY      cataract surgery   • HIP SURGERY     • HYSTERECTOMY  1999   • JOINT REPLACEMENT      hip left   • LUMBAR LAMINECTOMY WITH FUSION N/A 3/17/2017    Procedure: LUMBAR FUSION DECOMPRESSON WITH PEDICLE SCREWS L3-4 ;  Surgeon: Sen Skinner MD;  Location: [facility];  Service:    • OTHER SURGICAL HISTORY      arthrodesis lumbar   • OTHER SURGICAL HISTORY  1982    neuroplasty decompression median nerve at carpal tunnel   • TONSILLECTOMY  1963   • TOTAL HIP ARTHROPLASTY Right 2/20/2018    Procedure: TOTAL HIP ARTHROPLASTY;  Surgeon: Oswaldo Fisher MD;  Location: [facility];  Service:    • TUBAL ABDOMINAL LIGATION  1975       Examination:  Right Hip: Examination reveals Equal leg lengths.  Full mobility and normal neurovascular status.    Radiographs:  Right Hip xray today reveal total hip arthroplasty in good position and alignment.     Assessment:   78 y.o. female now 8 week(s) out from right total hip arthroplasty, doing well.  She was reassured today that she can begin full weightbearing.  She will continue using her walker but may progress to a cane.  She will follow-up in 8 weeks' time.  Weightbearing full.     Diagnosis Plan   1. Status post total replacement of right hip         Plan:  She will follow up in 8 weeks.      Cc:  Tobin Acosta MD    Scribed for Oswaldo Fisher MD by Rosita Fisher RN.11:04 AM 04/16/2018

## 2018-06-12 DIAGNOSIS — Z96.641 STATUS POST TOTAL REPLACEMENT OF RIGHT HIP: Primary | ICD-10-CM

## 2018-06-13 ENCOUNTER — HOSPITAL ENCOUNTER (OUTPATIENT)
Dept: GENERAL RADIOLOGY | Facility: HOSPITAL | Age: 78
Discharge: HOME | End: 2018-06-13
Attending: ORTHOPAEDIC SURGERY | Admitting: ORTHOPAEDIC SURGERY

## 2018-06-13 ENCOUNTER — OFFICE VISIT (OUTPATIENT)
Dept: ORTHOPEDIC SURGERY | Facility: CLINIC | Age: 78
End: 2018-06-13

## 2018-06-13 VITALS — HEIGHT: 60 IN | WEIGHT: 124 LBS | BODY MASS INDEX: 24.35 KG/M2

## 2018-06-13 DIAGNOSIS — Z96.641 STATUS POST TOTAL REPLACEMENT OF RIGHT HIP: Primary | ICD-10-CM

## 2018-06-13 DIAGNOSIS — Z96.641 STATUS POST TOTAL REPLACEMENT OF RIGHT HIP: ICD-10-CM

## 2018-06-13 PROCEDURE — 99212 OFFICE O/P EST SF 10 MIN: CPT | Performed by: ORTHOPAEDIC SURGERY

## 2018-06-13 PROCEDURE — 73502 X-RAY EXAM HIP UNI 2-3 VIEWS: CPT

## 2018-06-13 PROCEDURE — 73502 X-RAY EXAM HIP UNI 2-3 VIEWS: CPT | Performed by: RADIOLOGY

## 2018-06-13 RX ORDER — TRIAMCINOLONE ACETONIDE 1 MG/G
CREAM TOPICAL SEE ADMIN INSTRUCTIONS
Refills: 0 | COMMUNITY
Start: 2018-05-23

## 2018-06-13 NOTE — PROGRESS NOTES
Patient: Lisa Calloway  YOB: 1940  Date of Encounter: 06/13/2018      HPI:  Lisa Calloway, 78 y.o. female seen today 4 months post-op from right total hip arthroplasty.  She is doing remarkably well with minimal pain.    Medical History:  Patient Active Problem List   Diagnosis   • Essential hypertension   • Osteoarthritis (arthritis due to wear and tear of joints)   • Raynaud disease   • Anxiety   • COPD (chronic obstructive pulmonary disease)   • GERD (gastroesophageal reflux disease)   • Chronic diastolic heart failure   • Palpitations   • Polypharmacy   • Spondylolisthesis, lumbar region   • Spinal stenosis, lumbar region, with neurogenic claudication   • Spondylolisthesis, acquired   • Acquired spondylolisthesis   • Primary osteoarthritis of right hip   • Localized edema   • Right hip pain   • Status post total replacement of right hip     Past Medical History:   Diagnosis Date   • Anxiety    • Arthritis    • Calcinosis-Raynaud's sclerodactyly-telangiectasia syndrome    • CHF (congestive heart failure)    • COPD (chronic obstructive pulmonary disease)    • Diastolic heart failure 12/9/2016    Echocardiogram, 08/10/2011: LVH, LVEF 70% and no significant valvular abnormality.  Pharmacologic MPS, 07/28/2011: normal, LVEF 80%. Recurrent chest pain/CHF presentations to the [place], [place] area. Cardiac catheterization, 05/23/2012: Normal coronary arteries, LVEF 65%, LVEDP 32 mmHg. Renal angiography, 05/23/2012: normal bilateral renal arteries.  Echocardiogram for worsening dyspnea, 03/ • GERD (gastroesophageal reflux disease)    • Heart murmur    • Hiatal hernia    • High cholesterol    • History of recurrent UTIs    • History of staph infection     after hip replacement   • History of stomach ulcers    • History of transfusion    • Hypertension    • Low back pain    • Lumbar herniated disc     with right-sided sciatica:Status post lumbar fusion, 01/17/2013.   • Migraine    • Panic  attacks    • SOB (shortness of breath)    • Wears dentures     upper denture and partial   • Wears eyeglasses        Surgical History:  Past Surgical History:   Procedure Laterality Date   • ABDOMINAL SURGERY     • APPENDECTOMY  1975   • BACK SURGERY      lumbar fusion   • BREAST SURGERY      Biopsy   • CARDIAC SURGERY      cardiac cath   • CHOLECYSTECTOMY  1996   • EYE SURGERY      cataract surgery   • HIP SURGERY     • HYSTERECTOMY  1999   • JOINT REPLACEMENT      hip left   • LUMBAR LAMINECTOMY WITH FUSION N/A 3/17/2017    Procedure: LUMBAR FUSION DECOMPRESSON WITH PEDICLE SCREWS L3-4 ;  Surgeon: Sen Skinner MD;  Location: [facility] OR;  Service:    • OTHER SURGICAL HISTORY      arthrodesis lumbar   • OTHER SURGICAL HISTORY  1982    neuroplasty decompression median nerve at carpal tunnel   • TONSILLECTOMY  1963   • TOTAL HIP ARTHROPLASTY Right 2/20/2018    Procedure: TOTAL HIP ARTHROPLASTY;  Surgeon: Oswaldo Fisher MD;  Location: [facility];  Service:    • TUBAL ABDOMINAL LIGATION  1975         Examination:  Right Hip: Examination reveals equal leg lengths.  She can flex to 90°.  She has no pain with internal and external rotation.    Radiographs:  X-rays of right hip show total hip arthroplasty good position and alignment without loosening.  There is good bony ingrowth both the femoral and acetabular components.      Assessment:   78 y.o. female now 4 month(s) out from right total hip arthroplasty.        Diagnosis Plan   1. Status post total replacement of right hip         Plan:  She will follow up as needed.     Cc:  Tobin Acosta MD    Scribed for Oswaldo Fisher MD by Rosita Fisher RN.2:07 PM 06/13/2018

## 2018-07-27 ENCOUNTER — OFFICE VISIT (OUTPATIENT)
Dept: CARDIOLOGY | Facility: CLINIC | Age: 78
End: 2018-07-27

## 2018-07-27 VITALS
BODY MASS INDEX: 24.35 KG/M2 | SYSTOLIC BLOOD PRESSURE: 126 MMHG | HEIGHT: 60 IN | WEIGHT: 124 LBS | DIASTOLIC BLOOD PRESSURE: 82 MMHG | HEART RATE: 74 BPM

## 2018-07-27 DIAGNOSIS — R00.2 PALPITATIONS: Primary | ICD-10-CM

## 2018-07-27 DIAGNOSIS — I73.00 RAYNAUD'S DISEASE WITHOUT GANGRENE: ICD-10-CM

## 2018-07-27 DIAGNOSIS — I10 ESSENTIAL HYPERTENSION: ICD-10-CM

## 2018-07-27 DIAGNOSIS — I50.32 CHRONIC DIASTOLIC HEART FAILURE (HCC): ICD-10-CM

## 2018-07-27 PROCEDURE — 99214 OFFICE O/P EST MOD 30 MIN: CPT | Performed by: NURSE PRACTITIONER

## 2018-07-27 RX ORDER — CELECOXIB 200 MG/1
200 CAPSULE ORAL DAILY
COMMUNITY

## 2018-07-27 RX ORDER — FUROSEMIDE 40 MG/1
40 TABLET ORAL DAILY
Qty: 90 TABLET | Refills: 3 | Status: SHIPPED | OUTPATIENT
Start: 2018-07-27 | End: 2019-08-02 | Stop reason: SDUPTHER

## 2018-07-27 RX ORDER — FUROSEMIDE 40 MG/1
40 TABLET ORAL DAILY
COMMUNITY
End: 2018-07-27 | Stop reason: SDUPTHER

## 2018-07-27 NOTE — ASSESSMENT & PLAN NOTE
· Stable NYHA class II symptoms  · Continue Lasix 40 mg daily along with potassium 10 mEq 3 times a day

## 2018-08-14 DIAGNOSIS — Z12.31 VISIT FOR SCREENING MAMMOGRAM: ICD-10-CM

## 2018-08-14 DIAGNOSIS — R92.1 BREAST CALCIFICATIONS: Primary | ICD-10-CM

## 2018-10-08 ENCOUNTER — HOSPITAL ENCOUNTER (OUTPATIENT)
Dept: MAMMOGRAPHY | Facility: HOSPITAL | Age: 78
Discharge: HOME | End: 2018-10-08
Admitting: SURGERY

## 2018-10-08 DIAGNOSIS — Z12.31 VISIT FOR SCREENING MAMMOGRAM: ICD-10-CM

## 2018-10-08 DIAGNOSIS — R92.1 BREAST CALCIFICATIONS: ICD-10-CM

## 2018-10-08 PROCEDURE — 77063 BREAST TOMOSYNTHESIS BI: CPT | Performed by: RADIOLOGY

## 2018-10-08 PROCEDURE — 77067 SCR MAMMO BI INCL CAD: CPT | Performed by: RADIOLOGY

## 2018-10-08 PROCEDURE — 77067 SCR MAMMO BI INCL CAD: CPT

## 2018-10-22 ENCOUNTER — OFFICE VISIT (OUTPATIENT)
Dept: SURGERY | Facility: CLINIC | Age: 78
End: 2018-10-22

## 2018-10-22 VITALS
BODY MASS INDEX: 25.25 KG/M2 | DIASTOLIC BLOOD PRESSURE: 82 MMHG | HEIGHT: 60 IN | WEIGHT: 128.6 LBS | SYSTOLIC BLOOD PRESSURE: 168 MMHG | HEART RATE: 91 BPM

## 2018-10-22 DIAGNOSIS — R92.2 DENSE BREASTS: Primary | ICD-10-CM

## 2018-10-22 PROCEDURE — 99213 OFFICE O/P EST LOW 20 MIN: CPT | Performed by: SURGERY

## 2018-10-22 NOTE — PROGRESS NOTES
Subjective   Lisa Calloway is a 78 y.o. female is here today for follow-up breast care and mammogram review.    History of Present Illness  Ms. Calloway was seen in the office today for breast follow-up.  This is a 78-year-old female who was seen in the office in April 2015 for abnormal ultrasound.  Ultimately we area on ultrasound was felt to be fibrocystic tissue and not a distinct lesion.  However the patient does have an extremely busy mammogram with multiple calcifications and close follow-up was recommended.  The patient returns to the office having had a bilateral mammogram on 10/8/18 which demonstrated stable findings with no evidence of malignancy..  She denies a palpable mass or nipple discharge.  She denies any change in her personal or family breast history.  She is not on hormone replacement therapy.  Her last bone density was 12/19/16 which was normal.  Allergies   Allergen Reactions   • Amlodipine GI Intolerance   • Beta Adrenergic Blockers Nausea And Vomiting   • Ergotamine-Caffeine Other (See Comments)     Doesn't remember   • Lisinopril Other (See Comments)     hypotension   • Metoprolol GI Intolerance   • Tenormin [Atenolol] GI Intolerance   • Trovan [Trovafloxacin]    • Atorvastatin Myalgia   • Cefprozil Rash   • Codeine GI Intolerance   • Contrast Dye Swelling   • Coreg [Carvedilol] GI Intolerance   • Fish-Derived Products Rash   • Penicillins Hives   • Sectral [Acebutolol Hcl] Rash   • Sulfa Antibiotics Rash   • Tetracycline Rash         Current Outpatient Prescriptions   Medication Sig Dispense Refill   • amitriptyline (ELAVIL) 10 MG tablet Take 20 mg by mouth Every Night.     • aspirin 81 MG tablet Take 1 tablet by mouth Daily. Prior to [facility] Admission, Patient was on: on hold since saturday     • baclofen (LIORESAL) 10 MG tablet Take 1 tablet by mouth 3 (Three) Times a Day.     • busPIRone (BUSPAR) 10 MG tablet Take 10 mg by mouth 2 (Two) Times a Day.  4   • Calcium-Phosphorus-Vitamin D  (CITRACAL +D3 PO) Take 1 tablet by mouth 2 (Two) Times a Day.     • celecoxib (CeleBREX) 200 MG capsule Take 200 mg by mouth Daily.     • fluticasone (FLONASE) 50 MCG/ACT nasal spray 1 spray into each nostril Daily.  1   • furosemide (LASIX) 40 MG tablet Take 1 tablet by mouth Daily. 90 tablet 3   • gabapentin (NEURONTIN) 600 MG tablet Take 600 mg by mouth 3 (Three) Times a Day.     • loratadine (CLARITIN) 10 MG tablet Take 10 mg by mouth Daily.  1   • Multiple Vitamin (MULTI VITAMIN DAILY) tablet Take 1 tablet by mouth Daily.     • NIFEdipine XL (PROCARDIA XL) 30 MG 24 hr tablet Take 30 mg by mouth 3 (Three) Times a Day.     • omeprazole (PriLOSEC) 20 MG capsule Take 20 mg by mouth 2 (Two) Times a Day.     • potassium chloride (K-DUR,KLOR-CON) 10 MEQ CR tablet Take 10 mEq by mouth 3 (Three) Times a Day.     • PROAIR  (90 BASE) MCG/ACT inhaler Inhale 2 puffs Every 4 (Four) Hours As Needed for Wheezing.     • traMADol (ULTRAM) 50 MG tablet Take 50 mg by mouth Every 6 (Six) Hours As Needed for Moderate Pain .     • triamcinolone (KENALOG) 0.1 % cream See Admin Instructions.  0     No current facility-administered medications for this visit.      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  Pharmacologic MPS, 07/28/2011: normal, LVEF 80%. Recurrent chest pain/CHF presentations to the [place], [place] area. Cardiac catheterization, 05/23/2012: Normal coronary arteries, LVEF 65%, LVEDP 32 mmHg. Renal angiography, 05/23/2012: normal bilateral renal arteries.  Echocardiogram for worsening dyspnea, 03/ • GERD (gastroesophageal reflux disease)    • Heart murmur    • Hiatal hernia     • High cholesterol    • History of recurrent UTIs    • History of staph infection     after hip replacement   • History of stomach ulcers    • History of transfusion    • Hypertension    • Low back pain    • Lumbar herniated disc     with right-sided sciatica:Status post lumbar fusion, 01/17/2013.   • Migraine    • Panic attacks    • SOB (shortness of breath)    • Wears dentures     upper denture and partial   • Wears eyeglasses      Past Surgical History:   Procedure Laterality Date   • ABDOMINAL SURGERY     • APPENDECTOMY  1975   • BACK SURGERY      lumbar fusion   • BREAST BIOPSY Right 2009    benign   • BREAST SURGERY      Biopsy   • CARDIAC SURGERY      cardiac cath   • CHOLECYSTECTOMY  1996   • EYE SURGERY      cataract surgery   • HIP SURGERY     • HYSTERECTOMY  1999   • JOINT REPLACEMENT      hip left   • LUMBAR LAMINECTOMY WITH FUSION N/A 3/17/2017    Procedure: LUMBAR FUSION DECOMPRESSON WITH PEDICLE SCREWS L3-4 ;  Surgeon: Sen Skinner MD;  Location: [facility];  Service:    • OTHER SURGICAL HISTORY      arthrodesis lumbar   • OTHER SURGICAL HISTORY  1982    neuroplasty decompression median nerve at carpal tunnel   • TONSILLECTOMY  1963   • TOTAL HIP ARTHROPLASTY Right 2/20/2018    Procedure: TOTAL HIP ARTHROPLASTY;  Surgeon: Oswaldo Fisher MD;  Location: [facility];  Service:    • TUBAL ABDOMINAL LIGATION  1975     The following portions of the patient's history were reviewed and updated as appropriate: allergies, current medications, past family history, past medical history, past social history, past surgical history and problem list.    Review of Systems  General: negative  Integumentary: negative  Eyes: negative  ENT: negative  Respiratory: negative  Gastrointestinal: negative  Cardiovascular: palpitations  Neurological: negative  Psychiatric: anxiety  Hematologic/Lymphatic: negative  Genitourinary: negative  Musculoskeletal: back pain  Endocrine: hot flashes  Breasts: negative    Objective  "  /82 (BP Location: Left arm)   Pulse 91   Ht 152.4 cm (60\")   Wt 58.3 kg (128 lb 9.6 oz)   BMI 25.12 kg/m²    Physical Exam   General:  This is a WD WN thin white female in no acute distress  HEENT exam:  WNL. Sclera are anicteric.  EOMI  Neck:  supple, FROM, without thyromegaly, cervical or supraclavicular adenopathy  Lungs:  Respiratory effort normal. Auscultation: Clear, without wheezes, rhonchi, rales  Heart:  Regular rate and rhythm, without murmur, gallop, rub.  No pedal edema  Breasts: On visual inspection the breasts are symmetrical. Examination of the right breast demonstrates no discrete mass, skin change, or axillary adenopathy.  Examination of the left breast demonstrates focal nodularity in the 2 o'clock position at the edge of the breast tissue.  This was not definitely present on prior examination.    Abdomen: Nontender, without mass or hepatosplenomegaly.  Musculoskeletal:  muscle strength/tone is normal.  Gait and station: normal. No digital cyanosis  Psyc:  alert, oriented x 3.  Mood and affect are appropriate  skin:  Warm with good turgor.  Without rash or lesion  extremities:  Examination of the extremities revealed no cyanosis, clubbing or edema.  Results/Data  Mammogram reports and images were reviewed and I with the assessment    Procedures     Assessment/Plan   History of abnormal ultrasound of the left breast.  Bilateral breast calcifications  Dense breasts     Plan:  Bilateral mammogram in one years time with return to the office following             Discussion/Summary    Patient's Body mass index is 25.12 kg/m². BMI is within normal parameters. No follow-up required.       Errors in dictation may reflect use of voice recognition software and not all errors in transcription may have been detected prior to signing.    Future Appointments  Date Time Provider Department Center   8/2/2019 1:30 PM Flako Lam IV, MD [facility] None     "

## 2018-10-27 PROBLEM — R92.2 DENSE BREASTS: Status: ACTIVE | Noted: 2018-10-27

## 2019-08-02 ENCOUNTER — OFFICE VISIT (OUTPATIENT)
Dept: CARDIOLOGY | Facility: CLINIC | Age: 79
End: 2019-08-02

## 2019-08-02 VITALS
BODY MASS INDEX: 24.15 KG/M2 | HEIGHT: 60 IN | HEART RATE: 95 BPM | WEIGHT: 123 LBS | DIASTOLIC BLOOD PRESSURE: 66 MMHG | SYSTOLIC BLOOD PRESSURE: 158 MMHG

## 2019-08-02 DIAGNOSIS — I10 ESSENTIAL HYPERTENSION: ICD-10-CM

## 2019-08-02 DIAGNOSIS — R00.2 PALPITATIONS: ICD-10-CM

## 2019-08-02 DIAGNOSIS — I73.00 RAYNAUD'S DISEASE WITHOUT GANGRENE: ICD-10-CM

## 2019-08-02 DIAGNOSIS — I50.32 CHRONIC DIASTOLIC HEART FAILURE (HCC): Primary | ICD-10-CM

## 2019-08-02 PROCEDURE — 99214 OFFICE O/P EST MOD 30 MIN: CPT | Performed by: INTERNAL MEDICINE

## 2019-08-02 RX ORDER — NIFEDIPINE 30 MG/1
30 TABLET, EXTENDED RELEASE ORAL 3 TIMES DAILY
Qty: 270 TABLET | Refills: 3 | Status: SHIPPED | OUTPATIENT
Start: 2019-08-02 | End: 2020-08-12

## 2019-08-02 RX ORDER — FUROSEMIDE 20 MG/1
20 TABLET ORAL DAILY
Qty: 90 TABLET | Refills: 3 | Status: SHIPPED | OUTPATIENT
Start: 2019-08-02 | End: 2020-08-12

## 2019-08-02 RX ORDER — POTASSIUM CHLORIDE 750 MG/1
10 TABLET, EXTENDED RELEASE ORAL DAILY
Qty: 90 TABLET | Refills: 3 | Status: SHIPPED | OUTPATIENT
Start: 2019-08-02 | End: 2020-08-12

## 2019-08-02 RX ORDER — SERTRALINE HYDROCHLORIDE 100 MG/1
200 TABLET, FILM COATED ORAL DAILY
COMMUNITY

## 2019-08-02 NOTE — ASSESSMENT & PLAN NOTE
· Patient reports episodes of hypotension and dizziness which concerns me for volume depletion from loop diuretics  · Reduce furosemide to 20 mg daily and potassium chloride to 10 mg once daily

## 2019-08-02 NOTE — ASSESSMENT & PLAN NOTE
· Reasonable blood pressure control for her today  · Continue nifedipine 2 times to 3 times daily

## 2019-08-02 NOTE — PROGRESS NOTES
Encounter Date:08/02/2019    Patient ID: Lisa Calloway is a 79 y.o. female who resides in [place], [place].    CC/Reason for visit:  Palpitations           Problem List Items Addressed This Visit        Cardiology Problems    Essential hypertension    Overview     · Renal angiography (5/23/2012): normal bilateral renal arteries.          Current Assessment & Plan     · Reasonable blood pressure control for her today  · Continue nifedipine 2 times to 3 times daily           Relevant Medications    furosemide (LASIX) 20 MG tablet    NIFEdipine XL (PROCARDIA XL) 30 MG 24 hr tablet    Palpitations    Overview     · Essentially normal Holter monitor, 2012.  · Recurrent severe palpitations, October 2015.         Current Assessment & Plan     · Improved with antianxiety medication         Chronic diastolic heart failure (CMS/HCC) - Primary    Overview     · Echo (8/10/2011): LVH. LVEF 70%.  No significant valvular abnormality.   · Pharmacologic nuclear stress (7/28/2011): No ischemia. LVEF 80%.  · Recurrent chest pain/CHF presentations to the [place], [place] area.  · Cardiac catheterization (5/23/2012):  Normal coronary arteries, LVEF 65%, LVEDP 32 mmHg.  · Echocardiogram for worsening dyspnea (3/18/2016): LVEF >65%. No significant valvular abnormalities. RVSP 43 mmHg.   · GXT (3/18/2016): Exercised for 3 minutes (6 minutes expected). No ischemic changes. Normal oxygen saturation.          Current Assessment & Plan     · Patient reports episodes of hypotension and dizziness which concerns me for volume depletion from loop diuretics  · Reduce furosemide to 20 mg daily and potassium chloride to 10 mg once daily         Relevant Medications    furosemide (LASIX) 20 MG tablet    potassium chloride (K-DUR,KLOR-CON) 10 MEQ CR tablet    NIFEdipine XL (PROCARDIA XL) 30 MG 24 hr tablet    Raynaud disease               · Reduce furosemide to 20 mg daily and potassium chloride to 10 mEq daily  · Otherwise continue present  medical therapy  Return in about 12 months (around 8/2/2020) for Follow-up with [facility] only.          Lisa Calloway returns to my office today for follow-up of her diastolic heart failure, palpitations, and hypertension.  The patient is doing well from a cardiovascular standpoint.  She has started receiving treatment for anxiety and states her palpitations symptoms have improved.  She is irritated about recent crowns that were placed in her mouth that did not fit.  She reports she continues to take furosemide for edema.  Her ankles today look great after driving 2 hours in the car for her office visit and not taking her Lasix this morning.  She reports having episodes of low blood pressures.    Review of Systems   Constitution: Positive for fever, weakness, malaise/fatigue, night sweats and weight loss.   Cardiovascular: Positive for leg swelling.   Respiratory: Positive for shortness of breath.    Hematologic/Lymphatic: Bruises/bleeds easily.   Skin: Positive for dry skin, flushing and rash.   Musculoskeletal: Positive for arthritis, joint pain, joint swelling and muscle cramps.   Gastrointestinal: Positive for bloating, abdominal pain and flatus.   Neurological: Positive for loss of balance.   Psychiatric/Behavioral: Positive for hypervigilance. The patient is nervous/anxious.        The patient's past medical, social, family history and ROS reviewed in the patient's electronic medical record.    Allergies  Amlodipine; Beta adrenergic blockers; Ergotamine-caffeine; Lisinopril; Metoprolol; Tenormin [atenolol]; Trovan [trovafloxacin]; Atorvastatin; Cefprozil; Codeine; Contrast dye; Coreg [carvedilol]; Fish-derived products; Penicillins; Sectral [acebutolol hcl]; Sulfa antibiotics; and Tetracycline    Outpatient Medications Marked as Taking for the 8/2/19 encounter (Office Visit) with Flako Lam IV, MD   Medication Sig Dispense Refill   • amitriptyline (ELAVIL) 10 MG tablet Take 20 mg by mouth Every  "Night.     • aspirin 81 MG tablet Take 1 tablet by mouth Daily. Prior to [facility] Admission, Patient was on: on hold since saturday     • baclofen (LIORESAL) 10 MG tablet Take 1 tablet by mouth 3 (Three) Times a Day.     • busPIRone (BUSPAR) 10 MG tablet Take 10 mg by mouth 2 (Two) Times a Day.  4   • Calcium-Phosphorus-Vitamin D (CITRACAL +D3 PO) Take 1 tablet by mouth 2 (Two) Times a Day.     • celecoxib (CeleBREX) 200 MG capsule Take 200 mg by mouth Daily.     • fluticasone (FLONASE) 50 MCG/ACT nasal spray 1 spray into each nostril Daily.  1   • furosemide (LASIX) 20 MG tablet Take 1 tablet by mouth Daily. 90 tablet 3   • gabapentin (NEURONTIN) 600 MG tablet Take 600 mg by mouth 3 (Three) Times a Day.     • loratadine (CLARITIN) 10 MG tablet Take 10 mg by mouth Daily.  1   • Multiple Vitamin (MULTI VITAMIN DAILY) tablet Take 1 tablet by mouth Daily.     • NIFEdipine XL (PROCARDIA XL) 30 MG 24 hr tablet Take 1 tablet by mouth 3 (Three) Times a Day. 270 tablet 3   • omeprazole (PriLOSEC) 20 MG capsule Take 20 mg by mouth 2 (Two) Times a Day.     • potassium chloride (K-DUR,KLOR-CON) 10 MEQ CR tablet Take 1 tablet by mouth Daily. 90 tablet 3   • PROAIR  (90 BASE) MCG/ACT inhaler Inhale 2 puffs Every 4 (Four) Hours As Needed for Wheezing.     • sertraline (ZOLOFT) 100 MG tablet Take 100 mg by mouth Daily.     • traMADol (ULTRAM) 50 MG tablet Take 50 mg by mouth Every 6 (Six) Hours As Needed for Moderate Pain .     • triamcinolone (KENALOG) 0.1 % cream See Admin Instructions.  0   • [DISCONTINUED] furosemide (LASIX) 40 MG tablet Take 1 tablet by mouth Daily. 90 tablet 3   • [DISCONTINUED] NIFEdipine XL (PROCARDIA XL) 30 MG 24 hr tablet Take 30 mg by mouth 3 (Three) Times a Day.     • [DISCONTINUED] potassium chloride (K-DUR,KLOR-CON) 10 MEQ CR tablet Take 10 mEq by mouth 3 (Three) Times a Day.             Blood pressure 158/66, pulse 95, height 152.4 cm (60\"), weight 55.8 kg (123 lb), not currently " breastfeeding.  Body mass index is 24.02 kg/m².  Vitals:    08/02/19 1351   Patient Position: Sitting       Physical Exam   Constitutional: She is oriented to person, place, and time. She appears well-developed and well-nourished.   HENT:   Head: Normocephalic and atraumatic.   Eyes: Pupils are equal, round, and reactive to light. No scleral icterus.   Neck: No JVD present. Carotid bruit is not present. No thyromegaly present.   Cardiovascular: Normal rate, regular rhythm, S1 normal and S2 normal. Exam reveals no gallop.   No murmur heard.  No lower extremity edema   Pulmonary/Chest: Effort normal and breath sounds normal.   Abdominal: Soft. There is no hepatosplenomegaly. There is no tenderness.   Neurological: She is alert and oriented to person, place, and time.   Skin: Skin is warm and dry. No cyanosis. Nails show no clubbing.   Psychiatric: She has a normal mood and affect. Her behavior is normal.       Data Review:     Norm Lam MD [facility], [facility]  Interventional and General Cardiology

## 2019-08-19 ENCOUNTER — TELEPHONE (OUTPATIENT)
Dept: CARDIOLOGY | Facility: CLINIC | Age: 79
End: 2019-08-19

## 2019-08-19 NOTE — TELEPHONE ENCOUNTER
[facility] is requesting cardiac clearance for 4 teeth extractions in an outpatient setting. On ASA 81 mg daily. OK to clear?

## 2019-10-09 DIAGNOSIS — R92.1 BREAST CALCIFICATIONS: Primary | ICD-10-CM

## 2019-11-14 ENCOUNTER — HOSPITAL ENCOUNTER (OUTPATIENT)
Dept: MAMMOGRAPHY | Facility: HOSPITAL | Age: 79
Discharge: HOME | End: 2019-11-14
Admitting: SURGERY

## 2019-11-14 ENCOUNTER — OFFICE VISIT (OUTPATIENT)
Dept: SURGERY | Facility: CLINIC | Age: 79
End: 2019-11-14

## 2019-11-14 VITALS
SYSTOLIC BLOOD PRESSURE: 162 MMHG | WEIGHT: 122.6 LBS | HEIGHT: 60 IN | BODY MASS INDEX: 24.07 KG/M2 | HEART RATE: 89 BPM | DIASTOLIC BLOOD PRESSURE: 96 MMHG

## 2019-11-14 DIAGNOSIS — R92.8 ABNORMAL MAMMOGRAM: ICD-10-CM

## 2019-11-14 DIAGNOSIS — Z12.31 VISIT FOR SCREENING MAMMOGRAM: ICD-10-CM

## 2019-11-14 DIAGNOSIS — R92.2 DENSE BREASTS: Primary | ICD-10-CM

## 2019-11-14 DIAGNOSIS — R92.1 BREAST CALCIFICATIONS: ICD-10-CM

## 2019-11-14 PROCEDURE — 77067 SCR MAMMO BI INCL CAD: CPT

## 2019-11-14 PROCEDURE — 77067 SCR MAMMO BI INCL CAD: CPT | Performed by: RADIOLOGY

## 2019-11-14 PROCEDURE — 77063 BREAST TOMOSYNTHESIS BI: CPT

## 2019-11-14 PROCEDURE — 77063 BREAST TOMOSYNTHESIS BI: CPT | Performed by: RADIOLOGY

## 2019-11-14 PROCEDURE — 99213 OFFICE O/P EST LOW 20 MIN: CPT | Performed by: SURGERY

## 2019-11-14 NOTE — PROGRESS NOTES
Subjective   Lisa Calloway is a 79 y.o. female is here today for follow-up     History of Present Illness  Ms. Calloway was seen in the office today for breast follow-up.  This is a 78-year-old female who was seen in the office in April 2015 for abnormal ultrasound.  Ultimately we area on ultrasound was felt to be fibrocystic tissue and not a distinct lesion.  However the patient does have an extremely busy mammogram with multiple calcifications and close follow-up was recommended.  The patient returns to the office having had a bilateral mammogram on 11/14/2019 which demonstrated stable findings with no evidence of malignancy..  She denies a palpable mass or nipple discharge.  She denies any change in her personal or family breast history.  She is not on hormone replacement therapy.  Her last bone density was 12/19/16 which was normal.  Allergies   Allergen Reactions   • Amlodipine GI Intolerance   • Beta Adrenergic Blockers Nausea And Vomiting   • Ergotamine-Caffeine Other (See Comments)     Doesn't remember   • Lisinopril Other (See Comments)     hypotension   • Metoprolol GI Intolerance   • Tenormin [Atenolol] GI Intolerance   • Trovan [Trovafloxacin]    • Atorvastatin Myalgia   • Cefprozil Rash   • Codeine GI Intolerance   • Contrast Dye Swelling   • Coreg [Carvedilol] GI Intolerance   • Fish-Derived Products Rash   • Penicillins Hives   • Sectral [Acebutolol Hcl] Rash   • Sulfa Antibiotics Rash   • Tetracycline Rash         Current Outpatient Medications   Medication Sig Dispense Refill   • amitriptyline (ELAVIL) 10 MG tablet Take 20 mg by mouth Every Night.     • aspirin 81 MG tablet Take 1 tablet by mouth Daily. Prior to [facility] Admission, Patient was on: on hold since saturday     • baclofen (LIORESAL) 10 MG tablet Take 1 tablet by mouth 3 (Three) Times a Day.     • busPIRone (BUSPAR) 10 MG tablet Take 10 mg by mouth 2 (Two) Times a Day.  4   • Calcium-Phosphorus-Vitamin D (CITRACAL +D3 PO) Take 1 tablet by  mouth 2 (Two) Times a Day.     • celecoxib (CeleBREX) 200 MG capsule Take 200 mg by mouth Daily.     • fluticasone (FLONASE) 50 MCG/ACT nasal spray 1 spray into each nostril Daily.  1   • furosemide (LASIX) 20 MG tablet Take 1 tablet by mouth Daily. 90 tablet 3   • gabapentin (NEURONTIN) 600 MG tablet Take 600 mg by mouth 3 (Three) Times a Day.     • loratadine (CLARITIN) 10 MG tablet Take 10 mg by mouth Daily.  1   • Multiple Vitamin (MULTI VITAMIN DAILY) tablet Take 1 tablet by mouth Daily.     • NIFEdipine XL (PROCARDIA XL) 30 MG 24 hr tablet Take 1 tablet by mouth 3 (Three) Times a Day. 270 tablet 3   • omeprazole (PriLOSEC) 20 MG capsule Take 20 mg by mouth 2 (Two) Times a Day.     • potassium chloride (K-DUR,KLOR-CON) 10 MEQ CR tablet Take 1 tablet by mouth Daily. 90 tablet 3   • PROAIR  (90 BASE) MCG/ACT inhaler Inhale 2 puffs Every 4 (Four) Hours As Needed for Wheezing.     • sertraline (ZOLOFT) 100 MG tablet Take 100 mg by mouth Daily.     • traMADol (ULTRAM) 50 MG tablet Take 50 mg by mouth Every 6 (Six) Hours As Needed for Moderate Pain .     • triamcinolone (KENALOG) 0.1 % cream See Admin Instructions.  0     No current facility-administered medications for this visit.      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  Pharmacologic MPS, 07/28/2011: normal, LVEF 80%. Recurrent chest pain/CHF presentations to the [place], [place] area. Cardiac catheterization, 05/23/2012: Normal coronary arteries, LVEF 65%, LVEDP 32 mmHg. Renal angiography, 05/23/2012: normal bilateral renal arteries.  Echocardiogram for worsening dyspnea, 03/ • GERD (gastroesophageal reflux  disease)    • Heart murmur    • Hiatal hernia    • High cholesterol    • History of recurrent UTIs    • History of staph infection     after hip replacement   • History of stomach ulcers    • History of transfusion    • Hypertension    • Low back pain    • Lumbar herniated disc     with right-sided sciatica:Status post lumbar fusion, 01/17/2013.   • Migraine    • Panic attacks    • SOB (shortness of breath)    • Wears dentures     upper denture and partial   • Wears eyeglasses      Past Surgical History:   Procedure Laterality Date   • ABDOMINAL SURGERY     • APPENDECTOMY  1975   • BACK SURGERY      lumbar fusion   • BREAST BIOPSY Right 2009    benign   • BREAST SURGERY      Biopsy   • CARDIAC SURGERY      cardiac cath   • CHOLECYSTECTOMY  1996   • EYE SURGERY      cataract surgery   • HIP SURGERY     • HYSTERECTOMY  1999   • JOINT REPLACEMENT      hip left   • LUMBAR LAMINECTOMY WITH FUSION N/A 3/17/2017    Procedure: LUMBAR FUSION DECOMPRESSON WITH PEDICLE SCREWS L3-4 ;  Surgeon: Sen Skinner MD;  Location: [facility];  Service:    • OTHER SURGICAL HISTORY      arthrodesis lumbar   • OTHER SURGICAL HISTORY  1982    neuroplasty decompression median nerve at carpal tunnel   • TONSILLECTOMY  1963   • TOTAL HIP ARTHROPLASTY Right 2/20/2018    Procedure: TOTAL HIP ARTHROPLASTY;  Surgeon: Oswaldo Fisher MD;  Location: [facility];  Service:    • TUBAL ABDOMINAL LIGATION  1975       The following portions of the patient's history were reviewed and updated as appropriate: allergies, current medications, past family history, past medical history, past social history, past surgical history and problem list.    Review of Systems  General: negative  Integumentary: negative  Eyes: negative  ENT: negative  Respiratory: shortness of breath  Gastrointestinal: heartburn  Cardiovascular: negative  Neurological: negative  Psychiatric: anxiety and insomnia  Hematologic/Lymphatic: negative  Genitourinary:  "negative  Musculoskeletal: painful joints and sore muscles  Endocrine: negative  Breasts: nipple inversion    Objective   Ht 152.4 cm (60\")   Wt 55.6 kg (122 lb 9.6 oz)   BMI 23.94 kg/m²    Physical Exam  General:  This is a WD WN thin white female in no acute distress  HEENT exam:  WNL. Sclera are anicteric.  EOMI  Neck:  supple, FROM, without thyromegaly, cervical or supraclavicular adenopathy  Lungs:  Respiratory effort normal. Auscultation: Clear, without wheezes, rhonchi, rales  Heart:  Regular rate and rhythm, without murmur, gallop, rub.  No pedal edema  Breasts: On visual inspection the breasts are symmetrical. Examination of the right breast demonstrates no discrete mass, skin change, or axillary adenopathy.  There is some nodularity in the upper outer quadrant of the right breast which is consistent with fibrocystic tissue eamination of the left breast demonstrates focal nodularity in the 2 o'clock position at the edge of the breast tissue.  This was not definitely present on prior examination.    Abdomen: Nontender, without mass or hepatosplenomegaly.  Musculoskeletal:  muscle strength/tone is normal.  Gait and station: normal. No digital cyanosis  Psyc:  alert, oriented x 3.  Mood and affect are appropriate  skin:  Warm with good turgor.  Without rash or lesion  extremities:  Examination of the extremities revealed no cyanosis, clubbing or edema  Results/Data  Mammogram reports and images were reviewed and agree with the assessment    Procedures     Assessment/Plan      History of abnormal ultrasound of the left breast.  Bilateral breast calcifications  Dense breasts     Plan:  Bilateral mammogram in one years time with return to the office following           Discussion/Summary    Patient's Body mass index is 23.94 kg/m². BMI is within normal parameters. No follow-up required..         Future Appointments   Date Time Provider Department Center   8/7/2020  2:15 PM Flako Lam IV, MD [facility] " [facility] None         Please note that portions of this note were completed with a voice recognition program.

## 2020-08-10 NOTE — PROGRESS NOTES
[facility]  Telehealth Office Visit Note    DATE: 08/12/2020    IDENTIFICATION: Lisa Calloway is a 80 y.o. female who resides in [place], [place]    REASON FOR VISIT:  • Diastolic heart failure  • Palpitation            Lisa Calloway had telephone visit today for follow-up of her diastolic heart failure, palpitations, and cardiac risk factors.  The patient is not had any chest pain since her last visit.  She has a history of palpitations but they have been controlled particularly since being on nifedipine.  She has a history of rainouts disease which is controlled as well.  At her last visit her Lasix was decreased as the patient was complaining of some hypotension and dizziness.  She tells me this morning her diastolic blood pressure was 55 and anytime it is below 60 she will feel dizzy and overall not feel well.  There is been sometimes she is even drink salt water to get her bottom number.  She takes Lasix and potassium daily.  She reports shortness of breath but nothing that has progressed or changed.  She thinks her breathing is stable.  She hardly even needs to use her inhaler anymore.    Review of Systems   Constitution: Positive for malaise/fatigue.   Eyes: Negative for vision loss in left eye and vision loss in right eye.   Cardiovascular: Negative for chest pain, dyspnea on exertion, near-syncope, orthopnea, palpitations, paroxysmal nocturnal dyspnea and syncope.   Musculoskeletal: Negative for myalgias.   Neurological: Positive for weakness. Negative for brief paralysis, excessive daytime sleepiness, focal weakness, numbness and paresthesias.   All other systems reviewed and are negative.      The patient's past medical, social, family history and ROS reviewed in the patient's electronic medical record.    Allergies   Allergen Reactions   • Amlodipine GI Intolerance   • Beta Adrenergic Blockers Nausea And Vomiting   • Ergotamine-Caffeine Other (See Comments)      Doesn't remember   • Lisinopril Other (See Comments)     hypotension   • Metoprolol GI Intolerance   • Tenormin [Atenolol] GI Intolerance   • Trovan [Trovafloxacin]    • Atorvastatin Myalgia   • Cefprozil Rash   • Codeine GI Intolerance   • Contrast Dye Swelling   • Coreg [Carvedilol] GI Intolerance   • Fish-Derived Products Rash   • Penicillins Hives   • Sectral [Acebutolol Hcl] Rash   • Sulfa Antibiotics Rash   • Tetracycline Rash         Current Outpatient Medications:   •  amitriptyline (ELAVIL) 10 MG tablet, Take 20 mg by mouth Every Night., Disp: , Rfl:   •  aspirin 81 MG tablet, Take 1 tablet by mouth Daily. Prior to [facility] Admission, Patient was on: on hold since saturday, Disp: , Rfl:   •  baclofen (LIORESAL) 10 MG tablet, Take 1 tablet by mouth 3 (Three) Times a Day., Disp: , Rfl:   •  busPIRone (BUSPAR) 10 MG tablet, Take 10 mg by mouth 3 (Three) Times a Day., Disp: , Rfl: 4  •  Calcium-Phosphorus-Vitamin D (CITRACAL +D3 PO), Take 1 tablet by mouth 2 (Two) Times a Day., Disp: , Rfl:   •  celecoxib (CeleBREX) 200 MG capsule, Take 200 mg by mouth Daily., Disp: , Rfl:   •  fluticasone (FLONASE) 50 MCG/ACT nasal spray, 1 spray into each nostril Daily., Disp: , Rfl: 1  •  furosemide (LASIX) 20 MG tablet, Take 0.5 tablets by mouth Daily., Disp: 90 tablet, Rfl: 3  •  gabapentin (NEURONTIN) 600 MG tablet, Take 600 mg by mouth 3 (Three) Times a Day., Disp: , Rfl:   •  loratadine (CLARITIN) 10 MG tablet, Take 10 mg by mouth Daily., Disp: , Rfl: 1  •  Multiple Vitamin (MULTI VITAMIN DAILY) tablet, Take 1 tablet by mouth Daily., Disp: , Rfl:   •  NIFEdipine XL (PROCARDIA XL) 30 MG 24 hr tablet, Take 1 tablet by mouth 2 (two) times a day., Disp: , Rfl:   •  omeprazole (PriLOSEC) 20 MG capsule, Take 20 mg by mouth 2 (Two) Times a Day., Disp: , Rfl:   •  PROAIR  (90 BASE) MCG/ACT inhaler, Inhale 2 puffs Every 4 (Four) Hours As Needed for Wheezing., Disp: , Rfl:   •  sertraline (ZOLOFT) 100 MG tablet, Take  200 mg by mouth Daily., Disp: , Rfl:   •  traMADol (ULTRAM) 50 MG tablet, Take 50 mg by mouth Every 6 (Six) Hours As Needed for Moderate Pain ., Disp: , Rfl:   •  triamcinolone (KENALOG) 0.1 % cream, See Admin Instructions., Disp: , Rfl: 0    Past Medical History:   Diagnosis Date   • Anxiety    • Arthritis    • 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  Pharmacologic MPS, 07/28/2011: normal, LVEF 80%. Recurrent chest pain/CHF presentations to the [facility]. Cardiac catheterization, 05/23/2012: Normal coronary arteries, LVEF 65%, LVEDP 32 mmHg. Renal angiography, 05/23/2012: normal bilateral renal arteries.  Echocardiogram for worsening dyspnea, 03/ • GERD (gastroesophageal reflux disease)    • Heart murmur    • Hiatal hernia    • High cholesterol    • History of recurrent UTIs    • History of staph infection     after hip replacement   • History of stomach ulcers    • History of transfusion    • Hypertension    • Low back pain    • Lumbar herniated disc     with right-sided sciatica:Status post lumbar fusion, 01/17/2013.   • Migraine    • Panic attacks    • SOB (shortness of breath)    • Wears dentures     upper denture and partial   • Wears eyeglasses        Past Surgical History:   Procedure Laterality Date   • ABDOMINAL SURGERY     • APPENDECTOMY  1975   • BACK SURGERY      lumbar fusion   • BREAST BIOPSY Right 2009    benign   • BREAST SURGERY      Biopsy   • CARDIAC SURGERY      cardiac cath   • CHOLECYSTECTOMY  1996   • EYE SURGERY      cataract surgery   • HIP SURGERY     • HYSTERECTOMY  1999   • JOINT REPLACEMENT      hip left   • KNEE SURGERY Left    • LUMBAR LAMINECTOMY WITH FUSION N/A 3/17/2017    Procedure: LUMBAR FUSION  "DECOMPRESSON WITH PEDICLE SCREWS L3-4 ;  Surgeon: Sen Skinner MD;  Location:  [facility] OR;  Service:    • OTHER SURGICAL HISTORY      arthrodesis lumbar   • OTHER SURGICAL HISTORY  1982    neuroplasty decompression median nerve at carpal tunnel   • TONSILLECTOMY  1963   • TOTAL HIP ARTHROPLASTY Right 2/20/2018    Procedure: TOTAL HIP ARTHROPLASTY;  Surgeon: Oswaldo Fisher MD;  Location: [facility] OR;  Service:    • TUBAL ABDOMINAL LIGATION  1975       Family History   Problem Relation Age of Onset   • Congenital heart disease Mother    • Hypertension Mother    • Stroke Mother    • Cancer Other    • Gout Other    • Breast cancer Neg Hx        Social History     Tobacco Use   • Smoking status: Never Smoker   • Smokeless tobacco: Never Used   Substance Use Topics   • Alcohol use: No           Blood pressure 145/55, pulse 89, height 152.4 cm (60\"), weight 52.6 kg (116 lb), not currently breastfeeding.  Body mass index is 22.65 kg/m².  Vitals:    08/12/20 1122   Patient Position: Sitting       Physical Exam   Constitutional: She is oriented to person, place, and time. She appears well-developed and well-nourished.   HENT:   Head: Normocephalic and atraumatic.   Eyes: Conjunctivae are normal. No scleral icterus.   Neck: Normal range of motion. No JVD present. Carotid bruit is not present. No thyromegaly present.   Cardiovascular: Normal rate and regular rhythm. Exam reveals no gallop.   No murmur heard.  Pulmonary/Chest: Effort normal and breath sounds normal.   Abdominal: Soft. She exhibits no distension and no mass. There is no hepatosplenomegaly.   Musculoskeletal: She exhibits no edema.   Neurological: She is alert and oriented to person, place, and time.   Skin: Skin is warm and dry. No rash noted.   Psychiatric: She has a normal mood and affect. Her behavior is normal.       Data Review (reviewed with patient):     Procedures    Lab Results   Component Value Date    GLUCOSE 123 (H) 02/23/2018    BUN 14 " 02/23/2018    CREATININE 0.62 02/23/2018    EGFRIFNONA 93 02/23/2018    EGFRIFAFRI 98 01/26/2018    BCR 22.6 02/23/2018    K 3.7 02/23/2018    CO2 27.1 02/23/2018    CALCIUM 8.3 02/23/2018    ALBUMIN 3.50 02/23/2018    ALKPHOS 110 (H) 02/23/2018    AST 53 (H) 02/23/2018     (H) 02/23/2018      No results found for: CHOL, CHLPL, TRIG, HDL, LDL, LDLDIRECT  Lab Results   Component Value Date    HGBA1C 5.80 (H) 03/16/2017     Lab Results   Component Value Date    WBC 9.13 02/23/2018    HGB 8.8 (L) 02/23/2018    HCT 28.4 (L) 02/23/2018    MCV 93.4 02/23/2018     02/23/2018     No results found for: TSH         Problem List Items Addressed This Visit        Cardiovascular and Mediastinum    Chronic diastolic heart failure (CMS/HCC) - Primary    Overview     · Echo (8/10/2011): LVH. LVEF 70%.  No significant valvular abnormality.   · Pharmacologic nuclear stress (7/28/2011): No ischemia. LVEF 80%.  · Recurrent chest pain/CHF presentations to the [place], [place] area.  · Cardiac catheterization (5/23/2012):  Normal coronary arteries, LVEF 65%, LVEDP 32 mmHg.  · Echocardiogram for worsening dyspnea (3/18/2016): LVEF >65%. No significant valvular abnormalities. RVSP 43 mmHg.   · GXT (3/18/2016): Exercised for 3 minutes (6 minutes expected). No ischemic changes. Normal oxygen saturation.          Current Assessment & Plan     · Stable NYHA class II symptoms  · Decrease Lasix 10 mg daily and discontinue potassium due to diastolic hypotension  · BMP in 1 week         Relevant Medications    furosemide (LASIX) 20 MG tablet    NIFEdipine XL (PROCARDIA XL) 30 MG 24 hr tablet    Other Relevant Orders    Basic Metabolic Panel    Essential hypertension    Overview     · Renal angiography (5/23/2012): normal bilateral renal arteries.          Current Assessment & Plan     · Experiencing diastolic hypotension likely from loop diuretic  · Decrease Lasix 10 mg daily and discontinue potassium  · BMP in 1 week  · Continue  nifedipine 30 mg twice daily         Relevant Medications    furosemide (LASIX) 20 MG tablet    NIFEdipine XL (PROCARDIA XL) 30 MG 24 hr tablet    Palpitations    Overview     · Essentially normal Holter monitor, 2012.  · Recurrent severe palpitations, October 2015.         Current Assessment & Plan     · Palpitations improved on nifedipine           Raynaud disease    Current Assessment & Plan     · Continue nifedipine 30 mg twice daily             Patient's palpitations are controlled.  She has having some diastolic hypotension.  I have asked her to decrease her Lasix to 10 mg daily and discontinue potassium.  Will obtain a BMP in 1 week.  She is not having any exertional chest pain and her breathing is stable.  She will follow-up for an in person visit in [facility] in 3 months.       · Decrease Lasix 10 mg daily and discontinue potassium due to diastolic hypotension  · BMP in 1 week.  Will mail the patient an order for this  Return in about 3 months (around 11/12/2020), or if symptoms worsen or fail to improve, for Follow-up with Dr. Lam next visit.    TITA Angel      This patient has consented to a telehealth visit via telephone. The visit was scheduled as a telephone visit to comply with patient safety concerns in accordance with CDC recommendations.  All vitals recorded within this visit are reported by the patient.  I spent  20 minutes in total including but not limited to the 15 minutes spent in direct conversation with this patient.   8/12/2020

## 2020-08-12 ENCOUNTER — OFFICE VISIT (OUTPATIENT)
Dept: CARDIOLOGY | Facility: CLINIC | Age: 80
End: 2020-08-12

## 2020-08-12 VITALS
WEIGHT: 116 LBS | BODY MASS INDEX: 22.78 KG/M2 | HEIGHT: 60 IN | DIASTOLIC BLOOD PRESSURE: 55 MMHG | SYSTOLIC BLOOD PRESSURE: 145 MMHG | HEART RATE: 89 BPM

## 2020-08-12 DIAGNOSIS — R00.2 PALPITATIONS: ICD-10-CM

## 2020-08-12 DIAGNOSIS — I50.32 CHRONIC DIASTOLIC HEART FAILURE (HCC): Primary | ICD-10-CM

## 2020-08-12 DIAGNOSIS — I73.00 RAYNAUD'S DISEASE WITHOUT GANGRENE: ICD-10-CM

## 2020-08-12 DIAGNOSIS — I10 ESSENTIAL HYPERTENSION: ICD-10-CM

## 2020-08-12 PROCEDURE — 99442 PR PHYS/QHP TELEPHONE EVALUATION 11-20 MIN: CPT | Performed by: NURSE PRACTITIONER

## 2020-08-12 RX ORDER — NIFEDIPINE 30 MG/1
30 TABLET, EXTENDED RELEASE ORAL 2 TIMES DAILY
Start: 2020-08-12

## 2020-08-12 RX ORDER — FUROSEMIDE 20 MG/1
10 TABLET ORAL DAILY
Qty: 90 TABLET | Refills: 3 | Status: SHIPPED | OUTPATIENT
Start: 2020-08-12

## 2020-08-12 NOTE — ASSESSMENT & PLAN NOTE
· Experiencing diastolic hypotension likely from loop diuretic  · Decrease Lasix 10 mg daily and discontinue potassium  · BMP in 1 week  · Continue nifedipine 30 mg twice daily

## 2020-08-12 NOTE — ASSESSMENT & PLAN NOTE
· Stable NYHA class II symptoms  · Decrease Lasix 10 mg daily and discontinue potassium due to diastolic hypotension  · BMP in 1 week

## 2020-12-01 DIAGNOSIS — R92.1 BREAST CALCIFICATIONS: ICD-10-CM

## 2020-12-01 DIAGNOSIS — Z78.0 POST-MENOPAUSE: Primary | ICD-10-CM

## 2021-01-11 ENCOUNTER — HOSPITAL ENCOUNTER (OUTPATIENT)
Dept: BONE DENSITY | Facility: HOSPITAL | Age: 81
Discharge: HOME | End: 2021-01-11

## 2021-01-11 ENCOUNTER — OFFICE VISIT (OUTPATIENT)
Dept: SURGERY | Facility: CLINIC | Age: 81
End: 2021-01-11

## 2021-01-11 ENCOUNTER — HOSPITAL ENCOUNTER (OUTPATIENT)
Dept: MAMMOGRAPHY | Facility: HOSPITAL | Age: 81
Discharge: HOME | End: 2021-01-11

## 2021-01-11 VITALS
HEIGHT: 60 IN | HEART RATE: 87 BPM | BODY MASS INDEX: 24.03 KG/M2 | DIASTOLIC BLOOD PRESSURE: 87 MMHG | SYSTOLIC BLOOD PRESSURE: 175 MMHG | WEIGHT: 122.4 LBS

## 2021-01-11 DIAGNOSIS — Z13.820 SCREENING FOR OSTEOPOROSIS: ICD-10-CM

## 2021-01-11 DIAGNOSIS — Z12.31 VISIT FOR SCREENING MAMMOGRAM: ICD-10-CM

## 2021-01-11 DIAGNOSIS — R92.1 BREAST CALCIFICATIONS: ICD-10-CM

## 2021-01-11 DIAGNOSIS — Z78.0 POST-MENOPAUSE: ICD-10-CM

## 2021-01-11 DIAGNOSIS — R92.8 ABNORMAL MAMMOGRAM: Primary | ICD-10-CM

## 2021-01-11 PROCEDURE — 77080 DXA BONE DENSITY AXIAL: CPT

## 2021-01-11 PROCEDURE — 99213 OFFICE O/P EST LOW 20 MIN: CPT | Performed by: SURGERY

## 2021-01-11 PROCEDURE — 77063 BREAST TOMOSYNTHESIS BI: CPT

## 2021-01-11 PROCEDURE — 77080 DXA BONE DENSITY AXIAL: CPT | Performed by: RADIOLOGY

## 2021-01-11 PROCEDURE — 77063 BREAST TOMOSYNTHESIS BI: CPT | Performed by: RADIOLOGY

## 2021-01-11 PROCEDURE — 77067 SCR MAMMO BI INCL CAD: CPT | Performed by: RADIOLOGY

## 2021-01-11 PROCEDURE — 77067 SCR MAMMO BI INCL CAD: CPT

## 2021-01-11 NOTE — PROGRESS NOTES
Subjective   Lisa Calloway is a 80 y.o. female is here today for follow-up after mammogram and DEXA.    History of Present Illness  Ms. Calloway was seen in the office today for breast follow-up.  This is a 80-year-old female who was seen in the office in April 2015 for abnormal ultrasound.  Ultimately the area on ultrasound was felt to be fibrocystic tissue and not a distinct lesion.  However the patient does have an extremely busy mammogram with multiple calcifications and close follow-up was recommended.  The patient returns to the office having had a bilateral mammogram on 1/11/21  just prior to the visit today.  Report is not available at the time of the patient's visit.  She denies a palpable mass or nipple discharge.  She denies any change in her personal or family breast history.  She is not on hormone replacement therapy.  Prior to today the patient's last bone density was in 2016 so a bone density was performed today and the results of this is pending as well.  Allergies   Allergen Reactions   • Amlodipine GI Intolerance   • Beta Adrenergic Blockers Nausea And Vomiting   • Ergotamine-Caffeine Other (See Comments)     Doesn't remember   • Lisinopril Other (See Comments)     hypotension   • Metoprolol GI Intolerance   • Tenormin [Atenolol] GI Intolerance   • Trovan [Trovafloxacin]    • Atorvastatin Myalgia   • Cefprozil Rash   • Codeine GI Intolerance   • Contrast Dye Swelling   • Coreg [Carvedilol] GI Intolerance   • Fish-Derived Products Rash   • Penicillins Hives   • Sectral [Acebutolol Hcl] Rash   • Sulfa Antibiotics Rash   • Tetracycline Rash       Current Outpatient Medications   Medication Sig Dispense Refill   • amitriptyline (ELAVIL) 10 MG tablet Take 20 mg by mouth Every Night.     • aspirin 81 MG tablet Take 1 tablet by mouth Daily. Prior to [facility] Admission, Patient was on: on hold since saturday     • baclofen (LIORESAL) 10 MG tablet Take 1 tablet by mouth 3 (Three) Times a Day.     • busPIRone  (BUSPAR) 10 MG tablet Take 10 mg by mouth 3 (Three) Times a Day.  4   • Calcium-Phosphorus-Vitamin D (CITRACAL +D3 PO) Take 1 tablet by mouth 2 (Two) Times a Day.     • celecoxib (CeleBREX) 200 MG capsule Take 200 mg by mouth Daily.     • fluticasone (FLONASE) 50 MCG/ACT nasal spray 1 spray into each nostril Daily.  1   • furosemide (LASIX) 20 MG tablet Take 0.5 tablets by mouth Daily. 90 tablet 3   • gabapentin (NEURONTIN) 600 MG tablet Take 600 mg by mouth 3 (Three) Times a Day.     • loratadine (CLARITIN) 10 MG tablet Take 10 mg by mouth Daily.  1   • Multiple Vitamin (MULTI VITAMIN DAILY) tablet Take 1 tablet by mouth Daily.     • NIFEdipine XL (PROCARDIA XL) 30 MG 24 hr tablet Take 1 tablet by mouth 2 (two) times a day.     • omeprazole (PriLOSEC) 20 MG capsule Take 20 mg by mouth 2 (Two) Times a Day.     • PROAIR  (90 BASE) MCG/ACT inhaler Inhale 2 puffs Every 4 (Four) Hours As Needed for Wheezing.     • sertraline (ZOLOFT) 100 MG tablet Take 200 mg by mouth Daily.     • traMADol (ULTRAM) 50 MG tablet Take 50 mg by mouth Every 6 (Six) Hours As Needed for Moderate Pain .     • triamcinolone (KENALOG) 0.1 % cream See Admin Instructions.  0     No current facility-administered medications for this visit.      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  Pharmacologic MPS, 07/28/2011: normal, LVEF 80%. Recurrent chest pain/CHF presentations to the [facility]. Cardiac catheterization, 05/23/2012: Normal coronary arteries, LVEF 65%, LVEDP 32 mmHg. Renal angiography, 05/23/2012: normal bilateral renal arteries.  Echocardiogram for worsening dyspnea, 03/ • GERD  "(gastroesophageal reflux disease)    • Heart murmur    • Hiatal hernia    • High cholesterol    • History of recurrent UTIs    • History of staph infection     after hip replacement   • History of stomach ulcers    • History of transfusion    • Hypertension    • Low back pain    • Lumbar herniated disc     with right-sided sciatica:Status post lumbar fusion, 01/17/2013.   • Migraine    • Panic attacks    • SOB (shortness of breath)    • Wears dentures     upper denture and partial   • Wears eyeglasses      Past Surgical History:   Procedure Laterality Date   • ABDOMINAL SURGERY     • APPENDECTOMY  1975   • BACK SURGERY      lumbar fusion   • BREAST BIOPSY Right 2009    benign   • BREAST SURGERY      Biopsy   • CARDIAC SURGERY      cardiac cath   • CHOLECYSTECTOMY  1996   • EYE SURGERY      cataract surgery   • HIP SURGERY     • HYSTERECTOMY  1999   • JOINT REPLACEMENT      hip left   • KNEE SURGERY Left    • LUMBAR LAMINECTOMY WITH FUSION N/A 3/17/2017    Procedure: LUMBAR FUSION DECOMPRESSON WITH PEDICLE SCREWS L3-4 ;  Surgeon: Sen Skinner MD;  Location: [facility] OR;  Service:    • OTHER SURGICAL HISTORY      arthrodesis lumbar   • OTHER SURGICAL HISTORY  1982    neuroplasty decompression median nerve at carpal tunnel   • TONSILLECTOMY  1963   • TOTAL HIP ARTHROPLASTY Right 2/20/2018    Procedure: TOTAL HIP ARTHROPLASTY;  Surgeon: Oswaldo Fisher MD;  Location: [facility] OR;  Service:    • TUBAL ABDOMINAL LIGATION  1975       Pertinent Review of Systems      Objective   /87 (BP Location: Left arm)   Pulse 87   Ht 152.4 cm (60\")   Wt 55.5 kg (122 lb 6.4 oz)   BMI 23.90 kg/m²    Physical Exam  Neck:  No supraclavicular adenopathy  Lungs:  Respiratory effort normal. Auscultation: Clear, without wheezes, rhonchi, rales  Heart:  Regular rate and rhythm, without murmur, gallop, rub.  No pedal edema  Breasts: On visual inspection the breasts are symmetrical.  Examination of the right breast demonstrates no " discrete mass, skin change, or axillary adenopathy.  Examination of the left breast demonstrates no discrete mass, skin change, or axillary adenopathy.     Procedures     Results/Data:  Imaging: Mammogram images were reviewed.  There is no obvious change from prior but will await formal radiology interpretation  Notes:   Lab:   Other:     Assessment/Plan   History of abnormal ultrasound of the left breast.  Bilateral breast calcifications  Dense breasts  Screening for osteoporosis     Plan:  Track mammogram and bone density       Discussion/Summary:    Time spent:     Patient's Body mass index is 23.9 kg/m². BMI is within normal parameters. No follow-up required..         No future appointments.      Please note that portions of this note were completed with a voice recognition program.

## 2021-01-20 ENCOUNTER — TELEPHONE (OUTPATIENT)
Dept: SURGERY | Facility: CLINIC | Age: 81
End: 2021-01-20

## 2021-01-20 RX ORDER — ALENDRONATE SODIUM 70 MG/1
70 TABLET ORAL
Qty: 12 TABLET | Refills: 3 | Status: SHIPPED | OUTPATIENT
Start: 2021-01-20 | End: 2022-01-20

## 2021-01-20 NOTE — TELEPHONE ENCOUNTER
Dr. Shahid had me call Miss Gonzalez and let her know her DEXA showed Osteoporosis and needs to stay on calcium and vit D plus start Fosamax. She ask that be sent into [facility].

## 2022-01-07 RX ORDER — ALENDRONATE SODIUM 70 MG/1
70 TABLET ORAL
Qty: 4 TABLET | Refills: 11 | Status: SHIPPED | OUTPATIENT
Start: 2022-01-07 | End: 2023-01-07

## 2022-01-27 DIAGNOSIS — R92.1 BREAST CALCIFICATIONS: Primary | ICD-10-CM

## 2022-03-08 ENCOUNTER — TELEPHONE (OUTPATIENT)
Dept: SURGERY | Facility: CLINIC | Age: 82
End: 2022-03-08

## 2022-03-08 NOTE — TELEPHONE ENCOUNTER
Ms. Gonzalez has been in the hospital and is currently unable to come for her mammogram and follow up with Dr. Shahid. Both appointments will be rescheduled once she is feeling well enough to attend.

## 2022-10-18 DIAGNOSIS — R92.1 BREAST CALCIFICATIONS: Primary | ICD-10-CM

## 2023-01-12 ENCOUNTER — APPOINTMENT (OUTPATIENT)
Dept: MAMMOGRAPHY | Facility: HOSPITAL | Age: 83
End: 2023-01-12
Payer: MEDICARE

## 2023-01-31 DIAGNOSIS — Z78.0 POST-MENOPAUSE: Primary | ICD-10-CM

## (undated) DEVICE — ANTIBACTERIAL UNDYED BRAIDED (POLYGLACTIN 910), SYNTHETIC ABSORBABLE SUTURE: Brand: COATED VICRYL

## (undated) DEVICE — SYR LL 3CC

## (undated) DEVICE — PROBE 945SPK1004 SURGEON CONTROLLED

## (undated) DEVICE — PAD GRND E/S W/CORD SPLT A/

## (undated) DEVICE — HANDPIECE SET WITH COAXIAL HIGH FLOW TIP AND SUCTION TUBE: Brand: INTERPULSE

## (undated) DEVICE — SYS SKIN CLS DERMABOND PRINEO W/60CM MESH TP

## (undated) DEVICE — 4-PORT MANIFOLD: Brand: NEPTUNE 2

## (undated) DEVICE — SYS SKIN CLS DERMABOND PRINEO W/22CM MESH TP

## (undated) DEVICE — SNAP KOVER: Brand: UNBRANDED

## (undated) DEVICE — ENCORE® LATEX MICRO SIZE 7.5, STERILE LATEX POWDER-FREE SURGICAL GLOVE: Brand: ENCORE

## (undated) DEVICE — DIFFUSER: Brand: CORE, MAESTRO

## (undated) DEVICE — MEDI-VAC NON-CONDUCTIVE SUCTION TUBING: Brand: CARDINAL HEALTH

## (undated) DEVICE — WIPE THERAWASH SLV SPEC CARE 2PK

## (undated) DEVICE — ENCORE® LATEX MICRO SIZE 7, STERILE LATEX POWDER-FREE SURGICAL GLOVE: Brand: ENCORE

## (undated) DEVICE — HOLDER: Brand: DEROYAL

## (undated) DEVICE — ENCORE® LATEX MICRO SIZE 8, STERILE LATEX POWDER-FREE SURGICAL GLOVE: Brand: ENCORE

## (undated) DEVICE — SUT MNCRYL PLS ANTIB UD 2/0 CP1 27IN

## (undated) DEVICE — INTENDED USE FOR SURGICAL MARKING ON INTACT SKIN, ALSO PROVIDES A PERMANENT METHOD OF IDENTIFYING OBJECTS IN THE OPERATING ROOM: Brand: WRITESITE® REGULAR TIP SKIN MARKER

## (undated) DEVICE — SUT MONOCRYL PLS ANTIB UND 3/0  PS1 27IN

## (undated) DEVICE — ENCORE® LATEX MICRO SIZE 6.5, STERILE LATEX POWDER-FREE SURGICAL GLOVE: Brand: ENCORE

## (undated) DEVICE — BLD KNIF METRIX SHEATHED 160MM

## (undated) DEVICE — OIL CARTRIDGE: Brand: CORE, MAESTRO

## (undated) DEVICE — AIRWY 90MM NO9

## (undated) DEVICE — FLTR HME STR UNIV W/SMPL PORT

## (undated) DEVICE — ELECTRODE 945NRE1003 SD NEEDLE RECORDING

## (undated) DEVICE — DRSNG TELFA PAD NONADH STR 1S 3X8IN

## (undated) DEVICE — SPNG GZ STRL 2S 4X4 12PLY

## (undated) DEVICE — TRAP,MUCUS SPECIMEN,40CC: Brand: MEDLINE

## (undated) DEVICE — PACK,UNIVERSAL,NO GOWNS: Brand: MEDLINE

## (undated) DEVICE — NDL SPINE 20G 3 1/2 YEL STRL 1P/U

## (undated) DEVICE — PILLW ABD MD

## (undated) DEVICE — SPNG LAP PREWASH SFTPK 18X18IN 5PK STRL

## (undated) DEVICE — MEDI-VAC YANKAUER SUCTION HANDLE W/BULBOUS TIP: Brand: CARDINAL HEALTH

## (undated) DEVICE — INSTRUMENT 8670001 SXT GUIDEWIRE BLUNT
Type: IMPLANTABLE DEVICE | Site: SPINE LUMBAR | Status: NON-FUNCTIONAL
Removed: 2017-03-17

## (undated) DEVICE — NDL HYPO ECLPS SFTY 22G 1 1/2IN

## (undated) DEVICE — NDL SPINE 26G 31/2IN LF

## (undated) DEVICE — 3.0MM PRECISION NEURO (MATCH HEAD)

## (undated) DEVICE — 2108 SERIES SAGITTAL BLADE (18.6 X 0.8 X 73.8MM)

## (undated) DEVICE — PIN, 9733236, 150MM, STERILE, PERC REF

## (undated) DEVICE — APPL CHLORAPREP W/TINT 26ML ORNG

## (undated) DEVICE — PK NEURO DISC 10

## (undated) DEVICE — 1 ML TUBERCULIN SYRINGE REGULAR TIP: Brand: MONOJECT

## (undated) DEVICE — SUT VIC 0 UR5 27IN VCP376H

## (undated) DEVICE — ST INF 2NDARY 20DRP VNT/NOVNT 30IN

## (undated) DEVICE — SPHR MARKR STEALTH STATION

## (undated) DEVICE — DRSNG TRNSP OPSITE 4X5 1/2IN BX10

## (undated) DEVICE — SUT ETHIB NO 2 X519H

## (undated) DEVICE — PK PRSTH HIP 70

## (undated) DEVICE — CANNULA,ADULT,SOFT-TOUCH,7TUBE,SC: Brand: MEDLINE